# Patient Record
Sex: MALE | Race: WHITE | NOT HISPANIC OR LATINO | Employment: FULL TIME | URBAN - METROPOLITAN AREA
[De-identification: names, ages, dates, MRNs, and addresses within clinical notes are randomized per-mention and may not be internally consistent; named-entity substitution may affect disease eponyms.]

---

## 2017-03-31 ENCOUNTER — ALLSCRIPTS OFFICE VISIT (OUTPATIENT)
Dept: OTHER | Facility: OTHER | Age: 42
End: 2017-03-31

## 2017-03-31 DIAGNOSIS — Z12.5 ENCOUNTER FOR SCREENING FOR MALIGNANT NEOPLASM OF PROSTATE: ICD-10-CM

## 2017-03-31 DIAGNOSIS — Z13.29 ENCOUNTER FOR SCREENING FOR OTHER SUSPECTED ENDOCRINE DISORDER: ICD-10-CM

## 2017-03-31 DIAGNOSIS — Z13.1 ENCOUNTER FOR SCREENING FOR DIABETES MELLITUS: ICD-10-CM

## 2017-03-31 DIAGNOSIS — Z13.0 ENCOUNTER FOR SCREENING FOR DISEASES OF THE BLOOD AND BLOOD-FORMING ORGANS AND CERTAIN DISORDERS INVOLVING THE IMMUNE MECHANISM: ICD-10-CM

## 2017-03-31 DIAGNOSIS — R73.01 IMPAIRED FASTING GLUCOSE: ICD-10-CM

## 2017-03-31 DIAGNOSIS — E55.9 VITAMIN D DEFICIENCY: ICD-10-CM

## 2017-03-31 DIAGNOSIS — E78.5 HYPERLIPIDEMIA: ICD-10-CM

## 2017-03-31 DIAGNOSIS — E29.1 TESTICULAR HYPOFUNCTION: ICD-10-CM

## 2017-04-03 ENCOUNTER — TRANSCRIBE ORDERS (OUTPATIENT)
Dept: LAB | Facility: CLINIC | Age: 42
End: 2017-04-03

## 2017-04-03 ENCOUNTER — APPOINTMENT (OUTPATIENT)
Dept: LAB | Facility: CLINIC | Age: 42
End: 2017-04-03
Payer: COMMERCIAL

## 2017-04-03 DIAGNOSIS — Z13.29 ENCOUNTER FOR SCREENING FOR OTHER SUSPECTED ENDOCRINE DISORDER: ICD-10-CM

## 2017-04-03 DIAGNOSIS — Z12.5 ENCOUNTER FOR SCREENING FOR MALIGNANT NEOPLASM OF PROSTATE: ICD-10-CM

## 2017-04-03 DIAGNOSIS — Z13.0 ENCOUNTER FOR SCREENING FOR DISEASES OF THE BLOOD AND BLOOD-FORMING ORGANS AND CERTAIN DISORDERS INVOLVING THE IMMUNE MECHANISM: ICD-10-CM

## 2017-04-03 DIAGNOSIS — Z13.1 ENCOUNTER FOR SCREENING FOR DIABETES MELLITUS: ICD-10-CM

## 2017-04-03 DIAGNOSIS — E55.9 VITAMIN D DEFICIENCY: ICD-10-CM

## 2017-04-03 DIAGNOSIS — E29.1 TESTICULAR HYPOFUNCTION: ICD-10-CM

## 2017-04-03 DIAGNOSIS — E78.5 HYPERLIPIDEMIA: ICD-10-CM

## 2017-04-03 LAB
25(OH)D3 SERPL-MCNC: 16.4 NG/ML (ref 30–100)
ALBUMIN SERPL BCP-MCNC: 3.9 G/DL (ref 3.5–5)
ALP SERPL-CCNC: 70 U/L (ref 46–116)
ALT SERPL W P-5'-P-CCNC: 89 U/L (ref 12–78)
ANION GAP SERPL CALCULATED.3IONS-SCNC: 6 MMOL/L (ref 4–13)
AST SERPL W P-5'-P-CCNC: 31 U/L (ref 5–45)
BILIRUB SERPL-MCNC: 0.58 MG/DL (ref 0.2–1)
BUN SERPL-MCNC: 13 MG/DL (ref 5–25)
CALCIUM SERPL-MCNC: 9.1 MG/DL (ref 8.3–10.1)
CHLORIDE SERPL-SCNC: 106 MMOL/L (ref 100–108)
CHOLEST SERPL-MCNC: 174 MG/DL (ref 50–200)
CO2 SERPL-SCNC: 26 MMOL/L (ref 21–32)
CREAT SERPL-MCNC: 0.92 MG/DL (ref 0.6–1.3)
ERYTHROCYTE [DISTWIDTH] IN BLOOD BY AUTOMATED COUNT: 14 % (ref 11.6–15.1)
GFR SERPL CREATININE-BSD FRML MDRD: >60 ML/MIN/1.73SQ M
GLUCOSE P FAST SERPL-MCNC: 112 MG/DL (ref 65–99)
HCT VFR BLD AUTO: 48.5 % (ref 36.5–49.3)
HDLC SERPL-MCNC: 29 MG/DL (ref 40–60)
HGB BLD-MCNC: 16.4 G/DL (ref 12–17)
MCH RBC QN AUTO: 30.5 PG (ref 26.8–34.3)
MCHC RBC AUTO-ENTMCNC: 33.8 G/DL (ref 31.4–37.4)
MCV RBC AUTO: 90 FL (ref 82–98)
PLATELET # BLD AUTO: 262 THOUSANDS/UL (ref 149–390)
PMV BLD AUTO: 10.6 FL (ref 8.9–12.7)
POTASSIUM SERPL-SCNC: 4.1 MMOL/L (ref 3.5–5.3)
PROT SERPL-MCNC: 7.4 G/DL (ref 6.4–8.2)
PSA SERPL-MCNC: 0.6 NG/ML (ref 0–4)
RBC # BLD AUTO: 5.37 MILLION/UL (ref 3.88–5.62)
SODIUM SERPL-SCNC: 138 MMOL/L (ref 136–145)
TRIGL SERPL-MCNC: 774 MG/DL
TSH SERPL DL<=0.05 MIU/L-ACNC: 2.1 UIU/ML (ref 0.36–3.74)
WBC # BLD AUTO: 10.32 THOUSAND/UL (ref 4.31–10.16)

## 2017-04-03 PROCEDURE — G0103 PSA SCREENING: HCPCS

## 2017-04-03 PROCEDURE — 80053 COMPREHEN METABOLIC PANEL: CPT

## 2017-04-03 PROCEDURE — 36415 COLL VENOUS BLD VENIPUNCTURE: CPT

## 2017-04-03 PROCEDURE — 85027 COMPLETE CBC AUTOMATED: CPT

## 2017-04-03 PROCEDURE — 80061 LIPID PANEL: CPT

## 2017-04-03 PROCEDURE — 84402 ASSAY OF FREE TESTOSTERONE: CPT

## 2017-04-03 PROCEDURE — 84443 ASSAY THYROID STIM HORMONE: CPT

## 2017-04-03 PROCEDURE — 84403 ASSAY OF TOTAL TESTOSTERONE: CPT

## 2017-04-03 PROCEDURE — 82306 VITAMIN D 25 HYDROXY: CPT

## 2017-04-04 LAB
TESTOST FREE SERPL-MCNC: 19.5 PG/ML (ref 6.8–21.5)
TESTOST SERPL-MCNC: 195 NG/DL (ref 348–1197)
TESTOSTERONE COMMENT: ABNORMAL

## 2017-04-05 ENCOUNTER — GENERIC CONVERSION - ENCOUNTER (OUTPATIENT)
Dept: OTHER | Facility: OTHER | Age: 42
End: 2017-04-05

## 2017-04-06 ENCOUNTER — TRANSCRIBE ORDERS (OUTPATIENT)
Dept: LAB | Facility: CLINIC | Age: 42
End: 2017-04-06

## 2017-04-06 ENCOUNTER — APPOINTMENT (OUTPATIENT)
Dept: LAB | Facility: CLINIC | Age: 42
End: 2017-04-06
Payer: COMMERCIAL

## 2017-04-06 DIAGNOSIS — R73.01 IMPAIRED FASTING GLUCOSE: ICD-10-CM

## 2017-04-06 LAB
EST. AVERAGE GLUCOSE BLD GHB EST-MCNC: 120 MG/DL
HBA1C MFR BLD: 5.8 % (ref 4.2–6.3)

## 2017-04-06 PROCEDURE — 83036 HEMOGLOBIN GLYCOSYLATED A1C: CPT

## 2017-04-06 PROCEDURE — 36415 COLL VENOUS BLD VENIPUNCTURE: CPT

## 2017-04-07 ENCOUNTER — GENERIC CONVERSION - ENCOUNTER (OUTPATIENT)
Dept: OTHER | Facility: OTHER | Age: 42
End: 2017-04-07

## 2017-07-05 ENCOUNTER — ALLSCRIPTS OFFICE VISIT (OUTPATIENT)
Dept: OTHER | Facility: OTHER | Age: 42
End: 2017-07-05

## 2017-07-24 ENCOUNTER — ALLSCRIPTS OFFICE VISIT (OUTPATIENT)
Dept: OTHER | Facility: OTHER | Age: 42
End: 2017-07-24

## 2017-07-24 DIAGNOSIS — M54.16 RADICULOPATHY OF LUMBAR REGION: ICD-10-CM

## 2017-07-24 DIAGNOSIS — M25.572 PAIN IN LEFT ANKLE: ICD-10-CM

## 2017-07-24 DIAGNOSIS — R20.0 ANESTHESIA OF SKIN: ICD-10-CM

## 2017-07-25 ENCOUNTER — TRANSCRIBE ORDERS (OUTPATIENT)
Dept: URGENT CARE | Facility: CLINIC | Age: 42
End: 2017-07-25

## 2017-07-25 ENCOUNTER — APPOINTMENT (OUTPATIENT)
Dept: RADIOLOGY | Facility: CLINIC | Age: 42
End: 2017-07-25
Payer: COMMERCIAL

## 2017-07-25 DIAGNOSIS — M25.572 PAIN IN LEFT ANKLE: ICD-10-CM

## 2017-07-25 PROCEDURE — 73610 X-RAY EXAM OF ANKLE: CPT

## 2017-07-27 ENCOUNTER — GENERIC CONVERSION - ENCOUNTER (OUTPATIENT)
Dept: OTHER | Facility: OTHER | Age: 42
End: 2017-07-27

## 2017-08-04 ENCOUNTER — ALLSCRIPTS OFFICE VISIT (OUTPATIENT)
Dept: OTHER | Facility: OTHER | Age: 42
End: 2017-08-04

## 2017-10-13 ENCOUNTER — GENERIC CONVERSION - ENCOUNTER (OUTPATIENT)
Dept: OTHER | Facility: OTHER | Age: 42
End: 2017-10-13

## 2018-01-10 NOTE — RESULT NOTES
Verified Results  (1) CBC/ PLT (NO DIFF) 03Apr2017 07:56AM Radha Reilly Order Number: [id number]     Test Name Result Flag Reference   HEMATOCRIT 48 5 %  36 5-49 3   HEMOGLOBIN 16 4 g/dL  12 0-17 0   MCHC 33 8 g/dL  31 4-37 4   MCH 30 5 pg  26 8-34 3   MCV 90 fL  82-98   PLATELET COUNT 700 Thousands/uL  149-390   RBC COUNT 5 37 Million/uL  3 88-5 62   RDW 14 0 %  11 6-15 1   WBC COUNT 10 32 Thousand/uL H 4 31-10 16   MPV 10 6 fL  8 9-12 7     (1) COMPREHENSIVE METABOLIC PANEL [id number] 97:05EX Radha Reilly Order Number: [id number]     Test Name Result Flag Reference   SODIUM 138 mmol/L  136-145   POTASSIUM 4 1 mmol/L  3 5-5 3   CHLORIDE 106 mmol/L  100-108   CARBON DIOXIDE 26 mmol/L  21-32   ANION GAP (CALC) 6 mmol/L  4-13   BLOOD UREA NITROGEN 13 mg/dL  5-25   CREATININE 0 92 mg/dL  0 60-1 30   Standardized to IDMS reference method   CALCIUM 9 1 mg/dL  8 3-10 1   BILI, TOTAL 0 58 mg/dL  0 20-1 00   ALK PHOSPHATAS 70 U/L     ALT (SGPT) 89 U/L H 12-78   AST(SGOT) 31 U/L  5-45   ALBUMIN 3 9 g/dL  3 5-5 0   TOTAL PROTEIN 7 4 g/dL  6 4-8 2   eGFR Non-African American      >60 0 ml/min/1 73sq m   - Patient Instructions: This is a fasting blood test  Water,black tea or black  coffee only after 9:00pm the night before test Drink 2 glasses of water the morning of test - Patient Instructions: This bloodwork is non-fasting  Please drink two glasses of   water morning of bloodwork  National Kidney Disease Education Program recommendations are as follows:  GFR calculation is accurate only with a steady state creatinine  Chronic Kidney disease less than 60 ml/min/1 73 sq  meters  Kidney failure less than 15 ml/min/1 73 sq  meters     GLUCOSE FASTING 112 mg/dL H 65-99     (1) LIPID PANEL, FASTING 03Apr2017 07:56AM Hermes Rodriguez Order Number: [id number]     Test Name Result Flag Reference   CHOLESTEROL 174 mg/dL     HDL,DIRECT 29 mg/dL L 40-60   Specimen collection should occur prior to Metamizole administration due to the potential for falsely depressed results  LDL CHOLESTEROL CALCULATED (Report)  0-100   Calculated LDL invalid, triglycerides >400 mg/dl   - Patient Instructions: This is a fasting blood test  Water,black tea or black coffee only after 9:00pm the night before test   Drink 2 glasses of water the morning of test     - Patient Instructions: This is a fasting blood test  Water,black tea or black coffee only after 9:00pm the night before test Drink 2 glasses of water the morning of test - Patient Instructions: This bloodwork is non-fasting  Please drink two glasses of   water morning of bloodwork  Triglyceride:       Normal       <150 mg/dl     Borderline High  150-199 mg/dl     High        200-499 mg/dl     Very High     >499 mg/dl  Cholesterol:       Desirable    <200 mg/dl    Borderline High 200-239 mg/dl    High       >239 mg/dl  HDL Cholesterol:      High  >59 mg/dL    Low   <41 mg/dL   Calculated LDL invalid, triglycerides >400 mg/dl   - Patient Instructions: This is a fasting blood test  Water,black tea or black  coffee only after 9:00pm the night before test   Drink 2 glasses of water the morning of test     - Patient Instructions: This is a fasting blood test  Water,black tea or black  coffee only after 9:00pm the night before test Drink 2 glasses of water the morning of test - Patient Instructions: This bloodwork is non-fasting  Please drink two glasses of   water morning of bloodwork    Triglyceride:         Normal              <150 mg/dl       Borderline High    150-199 mg/dl       High               200-499 mg/dl       Very High          >499 mg/dl  Cholesterol:         Desirable        <200 mg/dl      Borderline High  200-239 mg/dl      High             >239 mg/dl  HDL Cholesterol:        High    >59 mg/dL      Low     <41 mg/dL   TRIGLYCERIDES 774 mg/dL H <=150   Specimen collection should occur prior to N-Acetylcysteine or Metamizole administration due to the potential for falsely depressed results  (1) TSH 03Apr2017 07:56AM Herb Giovanna Order Number: [id number]     Test Name Result Flag Reference   TSH 2 100 uIU/mL  0 358-3 740   - Patient Instructions: This bloodwork is non-fasting  Please drink two glasses of water morning of bloodwork  - Patient Instructions: This is a fasting blood test  Water,black tea or black  coffee only after 9:00pm the night before test Drink 2 glasses of water the morning of test - Patient Instructions: This bloodwork is non-fasting  Please drink two glasses of   water morning of bloodwork  Patients undergoing fluorescein dye angiography may retain small amounts of fluorescein in the body for 48-72 hours post procedure  Samples containing fluorescein can produce falsely depressed TSH values  If the patient had this procedure,a specimen should be resubmitted post fluorescein clearance  (1) VITAMIN D 25-HYDROXY 03Apr2017 07:56AM Jess Reilly Order Number: [id number]     Test Name Result Flag Reference   VIT D 25-HYDROX 16 4 ng/mL L 30 0-100 0   This assay is a certified procedure of the CDC Vitamin D Standardization Certification Program (VDSCP)     Deficiency <20ng/ml   Insufficiency 20-30ng/ml   Sufficient  ng/ml     *Patients undergoing fluorescein dye angiography may retain small amounts of fluorescein in the body for 48-72 hours post procedure  Samples containing fluorescein can produce falsely elevated Vitamin D values  If the patient had this procedure, a specimen should be resubmitted post fluorescein clearance       (1) PSA (SCREEN) (Dx V76 44 Screen for Prostate Cancer) 03Apr2017 07:56AM Herb Giovanna Order Number: [id number]     Test Name Result Flag Reference   PROSTATE SPECIFIC ANTIGEN 0 6 ng/mL  0 0-4 0   American Urological Association Guidelines define biochemical recurrence of prostate cancer as a detectable or rising PSA value post-radical prostatectomy that is greater than or equal to 0 2 ng/mL with a second confirmatory level of greater than or equal to 0 2 ng/mL  - Patient Instructions: This test is non-fasting  Please drink two glasses of water morning of bloodwork  - Patient Instructions: This test is non-fasting  Please drink two glasses of water morning of bloodwork  (1) TESTOSTERONE, FREE (DIRECT) AND TOTAL 03Apr2017 07:56AM Ashland Silence Order Number: [id number]     Test Name Result Flag Reference   FREE TESTOSTERONE, DIRECT 19 5 pg/mL  6 8 - 21 5   COMMENT Comment     Adult male reference interval is based on a population of lean males  up to 36years old     TESTOSTERONE (TOTAL) 195 ng/dL L 348 - 5913   Performed at:  [address]  [id number]  : Michael Tinoco MD, Phone:  [phone]       Plan  Hyperlipidemia    · Atorvastatin Calcium 20 MG Oral Tablet (Lipitor); TAKE 1 TABLET DAILY  Vitamin D deficiency    · Vitamin D3 45567 UNIT Oral Tablet; TAKE 1 TABLET Weekly

## 2018-01-11 NOTE — RESULT NOTES
Verified Results  * XR ANKLE 3+ VIEW LEFT [id number] 08:20AM Rosana Clark Order Number: [id number]     Test Name Result Flag Reference   XR ANKLE 3+ VW LEFT (Report)     LEFT ANKLE     INDICATION: Lateral left ankle pain     COMPARISON: None     VIEWS: 3     IMAGES: 3     FINDINGS:     There is no acute fracture or dislocation  No degenerative changes  No lytic or blastic lesions are seen  Soft tissues are unremarkable  IMPRESSION:     No acute osseous abnormality         Workstation performed: [id number]     Signed by:   Queenie Ortega MD   7/27/17

## 2018-01-12 NOTE — RESULT NOTES
Verified Results  (1) WOUND CULTURE [id number] 10:47AM Heidi Reilly     Test Name Result Flag Reference   CLINICAL REPORT (Report)     Test:        Wound culture  Specimen Type:    Wound  Specimen Date:   1/13/2016 12:03 PM  Result Date:    1/15/2016 10:47 AM  Result Status:   Final result  Resulting Lab:   [facility] [address] [facility] [address]            Tel: [phone]                 CULTURE                                       ------------------                                   3+ Growth of Mixed Skin Betty      STAIN                                        ------------------                                   Rare Polys    1+ Gram positive cocci in pairs and chains    1+ Gram positive cocci in groups    Rare Gram negative rods

## 2018-01-13 VITALS
WEIGHT: 213.38 LBS | HEART RATE: 80 BPM | SYSTOLIC BLOOD PRESSURE: 126 MMHG | DIASTOLIC BLOOD PRESSURE: 80 MMHG | RESPIRATION RATE: 16 BRPM | HEIGHT: 72 IN | BODY MASS INDEX: 28.9 KG/M2 | TEMPERATURE: 99.2 F

## 2018-01-14 VITALS
DIASTOLIC BLOOD PRESSURE: 88 MMHG | SYSTOLIC BLOOD PRESSURE: 138 MMHG | WEIGHT: 228.38 LBS | HEART RATE: 90 BPM | RESPIRATION RATE: 16 BRPM | TEMPERATURE: 98.9 F | BODY MASS INDEX: 30.93 KG/M2 | OXYGEN SATURATION: 97 % | HEIGHT: 72 IN

## 2018-01-14 VITALS
WEIGHT: 205 LBS | SYSTOLIC BLOOD PRESSURE: 131 MMHG | BODY MASS INDEX: 27.77 KG/M2 | DIASTOLIC BLOOD PRESSURE: 84 MMHG | HEART RATE: 96 BPM | HEIGHT: 72 IN

## 2018-01-14 VITALS
WEIGHT: 208.38 LBS | DIASTOLIC BLOOD PRESSURE: 84 MMHG | TEMPERATURE: 99.1 F | RESPIRATION RATE: 16 BRPM | HEIGHT: 72 IN | BODY MASS INDEX: 28.22 KG/M2 | HEART RATE: 80 BPM | SYSTOLIC BLOOD PRESSURE: 130 MMHG

## 2018-01-16 NOTE — RESULT NOTES
Verified Results  (1) HEMOGLOBIN A1C 06Apr2017 07:47AM Jackie Solomon Order Number: [id number]     Test Name Result Flag Reference   HEMOGLOBIN A1C 5 8 %  4 2-6 3   EST  AVG   GLUCOSE 120 mg/dl

## 2018-01-22 VITALS
OXYGEN SATURATION: 97 % | HEART RATE: 90 BPM | TEMPERATURE: 99 F | RESPIRATION RATE: 16 BRPM | BODY MASS INDEX: 27.12 KG/M2 | SYSTOLIC BLOOD PRESSURE: 136 MMHG | DIASTOLIC BLOOD PRESSURE: 94 MMHG | HEIGHT: 72 IN | WEIGHT: 200.25 LBS

## 2018-01-24 NOTE — MISCELLANEOUS
Assessment   1  Viral infection (079 99) (B34 9)  2  Wound of skin (782 9) (R23 8)  3  Infectious mononucleosis (075) (B27 90)    Plan  Facial cellulitis    · Stop: Clindamycin HCl - 300 MG Oral Capsule  Rx By: Rock Sweet; Dispense: 7 Days ; #:28 Capsule; Refill: 0;For: Facial cellulitis; RIN   = N; Verified Transmission to [facility]; Last Updated By: Governor Wallace;   1/20/2016 8:03:28 AM  Viral infection    · Start: ValACYclovir HCl - 1 GM Oral Tablet (Valtrex); TAKE 1 TABLET 3 TIMES DAILY  Rx By: Jyoti De La O; Dispense: 7 Days ; #:21 Tablet; Refill: 0;For: Viral infection; RIN   = N; Verified Transmission to [facility] Last Updated By: System,   [employer]; 1/20/2016 8:32:51 AM    Discussion/Summary  Discussion Summary:   Labs pending  Will get from hospital and call pt with results  Will start pt with valtrex to help with sore that broke out secondary to acute viral illness  RTO prn  Counseling Documentation With Imm: The patient was counseled regarding instructions for management, risk factor reductions, patient and family education, risks and benefits of treatment options, importance of compliance with treatment  Medication SE Review and Pt Understands Tx: Possible side effects of new medications were reviewed with the patient/guardian today  The treatment plan was reviewed with the patient/guardian  The patient/guardian understands and agrees with the treatment plan      Chief Complaint  Chief Complaint Free Text Note Form: Pt here for f/u from [facility] admission  Pt had recent lip infection and went to ER for IV antibiotic therapy  Pt was told it was viral so he didn't need antibiotics  Pt is feeling better  Pt still having other pain that he discussed in previous visit, bilateral hand pain and neck pain  History of Present Illness  TCM Communication [facility]: The patient is being contacted for follow-up after hospitalization and 01/20/2016   Hospital records were not available  He was hospitalized at [facility]  The  dates of hospitalization: 01/13/20161  ,  date of admission: 01/13/20161  ,  date of discharge: 01/14/20161  1   Diagnosis: viral infection of lip  He was discharged to home  Symptoms: fatigue  Counseling was provided to the patient  Communication performed and completed by ariel sy   HPI: Pt presents for follow up from hospitalization  He was seen because the sore on his lip became bigger and more painful  He was admitted overnight  He was seen by infectious disease and was told etiology was viral  Antibiotics were stopped  Pt has labs drawn but the results were still pending upon his discharge so he does not know if anything came back abnormal        1 Amended By: Cristine Melchor; Jan 22 2016 9:56 AM EST    Review of Systems  Complete-Male:   Constitutional: as noted in HPI  Eyes: No complaints of eye pain, no red eyes, no discharge from eyes, no itchy eyes  ENT: as noted in HPI  Cardiovascular: No complaints of slow heart rate, no fast heart rate, no chest pain, no palpitations, no leg claudication, no lower extremity  Respiratory: No complaints of shortness of breath, no wheezing, no cough, no SOB on exertion, no orthopnea or PND  Gastrointestinal: No complaints of abdominal pain, no constipation, no nausea or vomiting, no diarrhea or bloody stools  Genitourinary: No complaints of dysuria, no incontinence, no hesitancy, no nocturia, no genital lesion, no testicular pain  Musculoskeletal: No complaints of arthralgia, no myalgias, no joint swelling or stiffness, no limb pain or swelling  Integumentary: No complaints of skin rash or skin lesions, no itching, no skin wound, no dry skin  Neurological: No compliants of headache, no confusion, no convulsions, no numbness or tingling, no dizziness or fainting, no limb weakness, no difficulty walking     Psychiatric: Is not suicidal, no sleep disturbances, no anxiety or depression, no change in personality, no emotional problems  Endocrine: No complaints of proptosis, no hot flashes, no muscle weakness, no erectile dysfunction, no deepening of the voice, no feelings of weakness  ROS Reviewed:   ROS reviewed  Active Problems   1  Closed fracture of 5th metacarpal (815 00) (S62 308A)  2  Encounter for screening for malignant neoplasm of prostate (V76 44) (Z12 5)  3  Facial cellulitis (682 0) (L03 211)  4  GERD (gastroesophageal reflux disease) (530 81) (K21 9)  5  Hand injury (959 4) (S69 90XA)  6  Hyperlipidemia (272 4) (E78 5)  7  Lipid screening (V77 91) (Z13 220)  8  Neck pain (723 1) (M54 2)  9  Need for influenza vaccination (V04 81) (Z23)  10  Nicotine dependence (305 1) (F17 200)  11  Pain in both hands (729 5) (M79 641,M79 642)  12  Pain of both wrist joints (719 43) (M25 531,M25 532)  13  Screening for depression (V79 0) (Z13 89)    Past Medical History   1  No pertinent past medical history    Surgical History   1  History of Extensor Tendon Repair (Each)  2  History of Neuroplasty Median Nerve At Carpal Tunnel  Surgical History Reviewed: The surgical history was reviewed and updated today  Family History   1  No pertinent family history   2  Family history of diabetes mellitus (V18 0) (Z83 3)  Family History Reviewed: The family history was reviewed and updated today  Social History    · Current smoker (305 1) (Z72 0)   ·    · Occasional alcohol use   · Social alcohol use (F10 99)  Social History Reviewed: The social history was reviewed and updated today  The social history was reviewed and is unchanged  Current Meds  1  Clindamycin HCl - 300 MG Oral Capsule; take 1 capsule 4 times daily; Therapy: 57YXU4017 to (Evaluate:18Jan2016)  Requested for: 56KXA1987; Last   Rx:11Jan2016 Ordered  2  Ibuprofen 600 MG Oral Tablet; TAKE 1 TABLET Every 6 hours; Therapy: 11ERD8891 to (Evaluate:16Oct2015) Recorded  3   Lansoprazole 30 MG Oral Capsule Delayed Release; TAKE 1 CAPSULE Daily; Therapy: 64BQH4267 to (Last Rx:34Vta6781)  Requested for: 11Dec2015 Ordered  4  Oxycodone-Acetaminophen 5-325 MG Oral Tablet; TAKE 1 TABLET EVERY 6 HOURS AS   NEEDED; Therapy: 38DET1110 to (Evaluate:17Jan2016); Last Rx:12Jan2016 Ordered  5  Simvastatin 20 MG Oral Tablet; TAKE 1 TABLET DAILY AS DIRECTED; Therapy: 50UVQ4933 to (Evaluate:11Apr2016); Last Rx:14Oct2015 Ordered  Medication List Reviewed: The medication list was reviewed and updated today  Allergies   1  No Known Drug Allergies   2  Other    Vitals  Signs [Data Includes: Current Encounter]   Recorded: 39LNM3455 08:03AM   Temperature: 97 F, Tympanic  Heart Rate: 100  Respiration: 18  Systolic: 135  Diastolic: 82  Height: 6 ft   Weight: 214 lb 2 08 oz  BMI Calculated: 29 04  BSA Calculated: 2 19    Physical Exam    Constitutional   General appearance: No acute distress, well appearing and well nourished  Eyes   Conjunctiva and lids: No swelling, erythema, or discharge  Pupils and irises: Equal, round and reactive to light  Ears, Nose, Mouth, and Throat   External inspection of ears and nose: Normal     Otoscopic examination: Tympanic membrance translucent with normal light reflex  Canals patent without erythema  Nasal mucosa, septum, and turbinates: Normal without edema or erythema  Oropharynx: Normal with no erythema, edema, exudate or lesions  Pulmonary   Respiratory effort: No increased work of breathing or signs of respiratory distress  Auscultation of lungs: Clear to auscultation, equal breath sounds bilaterally, no wheezes, no rales, no rhonci  Cardiovascular   Auscultation of heart: Normal rate and rhythm, normal S1 and S2, without murmurs  Examination of extremities for edema and/or varicosities: Normal     Carotid pulses: Normal     Abdomen   Abdomen: Non-tender, no masses  Liver and spleen: No hepatomegaly or splenomegaly      Lymphatic   Palpation of lymph nodes in neck: No lymphadenopathy  Musculoskeletal   Gait and station: Normal     Digits and nails: Normal without clubbing or cyanosis  Inspection/palpation of joints, bones, and muscles: Normal     Skin   Skin and subcutaneous tissue: Normal without rashes or lesions  Examination of the skin for lesions: Abnormal   A single erosion(s)  on the lower lip  Psychiatric   Orientation to person, place and time: Normal     Mood and affect: Normal          Attending Note  Collaborating Physician Note: Collaborating Physician: I agree with the Advanced Practitioner note        Signatures   Electronically signed by : Sigifredo Muñoz, [facility]; Jan 21 2016  5:19PM EST                       (Author)    Electronically signed by : Jyothi Moreno DO; Jan 21 2016  5:52PM EST                       (Co-author)    Electronically signed by : JAN Khoury ; Jul 18 2016 12:43PM EST                       (Author)

## 2018-01-25 DIAGNOSIS — G89.4 CHRONIC PAIN SYNDROME: Primary | ICD-10-CM

## 2018-01-25 DIAGNOSIS — K21.9 GASTROESOPHAGEAL REFLUX DISEASE WITHOUT ESOPHAGITIS: Primary | ICD-10-CM

## 2018-01-25 RX ORDER — HYDROCODONE BITARTRATE AND ACETAMINOPHEN 10; 325 MG/1; MG/1
1 TABLET ORAL 4 TIMES DAILY
COMMUNITY
Start: 2015-10-02 | End: 2018-01-25 | Stop reason: SDUPTHER

## 2018-01-25 RX ORDER — HYDROCODONE BITARTRATE AND ACETAMINOPHEN 10; 325 MG/1; MG/1
1 TABLET ORAL 4 TIMES DAILY
Qty: 30 TABLET | Refills: 0 | Status: SHIPPED | OUTPATIENT
Start: 2018-01-25 | End: 2018-01-26 | Stop reason: SDUPTHER

## 2018-01-25 RX ORDER — LANSOPRAZOLE 30 MG/1
CAPSULE, DELAYED RELEASE ORAL
Qty: 30 CAPSULE | Refills: 2 | Status: SHIPPED | OUTPATIENT
Start: 2018-01-25 | End: 2018-01-26 | Stop reason: SDUPTHER

## 2018-01-26 DIAGNOSIS — K21.9 GASTROESOPHAGEAL REFLUX DISEASE WITHOUT ESOPHAGITIS: ICD-10-CM

## 2018-01-26 DIAGNOSIS — G89.4 CHRONIC PAIN SYNDROME: ICD-10-CM

## 2018-01-26 RX ORDER — LANSOPRAZOLE 30 MG/1
CAPSULE, DELAYED RELEASE ORAL
Qty: 30 CAPSULE | Refills: 2 | Status: SHIPPED | OUTPATIENT
Start: 2018-01-26 | End: 2018-02-23 | Stop reason: SDUPTHER

## 2018-01-26 RX ORDER — HYDROCODONE BITARTRATE AND ACETAMINOPHEN 10; 325 MG/1; MG/1
1 TABLET ORAL 4 TIMES DAILY
Qty: 120 TABLET | Refills: 0 | Status: SHIPPED | OUTPATIENT
Start: 2018-01-26 | End: 2018-02-23 | Stop reason: SDUPTHER

## 2018-02-03 DIAGNOSIS — F33.41 RECURRENT MAJOR DEPRESSIVE DISORDER, IN PARTIAL REMISSION (HCC): Primary | ICD-10-CM

## 2018-02-03 RX ORDER — DULOXETIN HYDROCHLORIDE 60 MG/1
CAPSULE, DELAYED RELEASE ORAL
Qty: 30 CAPSULE | Refills: 5 | Status: SHIPPED | OUTPATIENT
Start: 2018-02-03 | End: 2018-07-29 | Stop reason: SDUPTHER

## 2018-02-21 ENCOUNTER — TELEPHONE (OUTPATIENT)
Dept: FAMILY MEDICINE CLINIC | Facility: CLINIC | Age: 43
End: 2018-02-21

## 2018-02-23 DIAGNOSIS — K21.9 GASTROESOPHAGEAL REFLUX DISEASE WITHOUT ESOPHAGITIS: ICD-10-CM

## 2018-02-23 DIAGNOSIS — G89.4 CHRONIC PAIN SYNDROME: ICD-10-CM

## 2018-02-23 RX ORDER — HYDROCODONE BITARTRATE AND ACETAMINOPHEN 10; 325 MG/1; MG/1
1 TABLET ORAL EVERY 6 HOURS PRN
Qty: 120 TABLET | Refills: 0 | Status: SHIPPED | OUTPATIENT
Start: 2018-02-23 | End: 2018-03-23 | Stop reason: SDUPTHER

## 2018-02-26 RX ORDER — LANSOPRAZOLE 30 MG/1
CAPSULE, DELAYED RELEASE ORAL
Qty: 30 CAPSULE | Refills: 2 | Status: SHIPPED | OUTPATIENT
Start: 2018-02-26 | End: 2018-06-21 | Stop reason: SDUPTHER

## 2018-03-19 ENCOUNTER — OFFICE VISIT (OUTPATIENT)
Dept: FAMILY MEDICINE CLINIC | Facility: CLINIC | Age: 43
End: 2018-03-19
Payer: COMMERCIAL

## 2018-03-19 VITALS
TEMPERATURE: 99.2 F | WEIGHT: 214 LBS | DIASTOLIC BLOOD PRESSURE: 86 MMHG | SYSTOLIC BLOOD PRESSURE: 130 MMHG | BODY MASS INDEX: 28.99 KG/M2 | HEART RATE: 84 BPM | RESPIRATION RATE: 16 BRPM | HEIGHT: 72 IN

## 2018-03-19 DIAGNOSIS — Z13.220 SCREENING FOR HYPERLIPIDEMIA: ICD-10-CM

## 2018-03-19 DIAGNOSIS — Z13.29 SCREENING FOR THYROID DISORDER: ICD-10-CM

## 2018-03-19 DIAGNOSIS — Z13.0 SCREENING FOR DEFICIENCY ANEMIA: ICD-10-CM

## 2018-03-19 DIAGNOSIS — Z13.1 SCREENING FOR DIABETES MELLITUS: ICD-10-CM

## 2018-03-19 DIAGNOSIS — R07.9 CHEST PAIN, UNSPECIFIED TYPE: Primary | ICD-10-CM

## 2018-03-19 DIAGNOSIS — E55.9 VITAMIN D DEFICIENCY: ICD-10-CM

## 2018-03-19 DIAGNOSIS — M77.12 LATERAL EPICONDYLITIS OF LEFT ELBOW: ICD-10-CM

## 2018-03-19 PROBLEM — M54.16 LUMBAR RADICULOPATHY: Status: ACTIVE | Noted: 2017-08-04

## 2018-03-19 PROBLEM — F32.A ANXIETY AND DEPRESSION: Status: ACTIVE | Noted: 2017-07-05

## 2018-03-19 PROBLEM — E29.1 TESTICULAR HYPOGONADISM: Status: ACTIVE | Noted: 2017-03-31

## 2018-03-19 PROBLEM — F41.9 ANXIETY AND DEPRESSION: Status: ACTIVE | Noted: 2017-07-05

## 2018-03-19 PROCEDURE — 99214 OFFICE O/P EST MOD 30 MIN: CPT | Performed by: PHYSICIAN ASSISTANT

## 2018-03-19 RX ORDER — PREGABALIN 75 MG/1
1 CAPSULE ORAL 2 TIMES DAILY
COMMUNITY
Start: 2017-10-13 | End: 2018-03-19 | Stop reason: ALTCHOICE

## 2018-03-19 RX ORDER — ATORVASTATIN CALCIUM 20 MG/1
1 TABLET, FILM COATED ORAL DAILY
COMMUNITY
Start: 2017-04-05 | End: 2018-04-18 | Stop reason: SDUPTHER

## 2018-03-19 NOTE — PROGRESS NOTES
Assessment/Plan:    Lateral epicondylitis of left elbow  Will refer to ortho for evaluation    Chest pain  Will get CXR due to smoking hx and stress echo to better evaluate if pain is cardiac origin       Diagnoses and all orders for this visit:    Chest pain, unspecified type  -     Echo stress test w contrast if indicated; Future  -     XR chest pa & lateral; Future    Screening for deficiency anemia  -     CBC and differential; Future  -     CBC and differential    Screening for diabetes mellitus  -     Comprehensive metabolic panel; Future  -     Comprehensive metabolic panel    Screening for hyperlipidemia  -     Lipid panel; Future  -     Lipid panel    Screening for thyroid disorder  -     TSH, 3rd generation with T4 reflex; Future  -     TSH, 3rd generation with T4 reflex    Vitamin D deficiency  -     Vitamin D 25 hydroxy; Future  -     Vitamin D 25 hydroxy    Lateral epicondylitis of left elbow  -     Ambulatory referral to Orthopedic Surgery; Future    Other orders  -     atorvastatin (LIPITOR) 20 mg tablet; Take 1 tablet by mouth daily  -     Cholecalciferol (D3-50) 71366 units capsule; Take 1 capsule by mouth once a week  -     Discontinue: pregabalin (LYRICA) 75 mg capsule; Take 1 capsule by mouth 2 (two) times a day          Subjective:      Patient ID: Edilberto Lira is a 36 y o  male  Pt presents complaining of increased pain  He notes that the pain starts in the back and chest and radiates upward into the neck and teeth  Lasts approx 15 minutes  Had 2 episodes within one week with the most recent occurring 1 1/2 weeks ago  He notes a sense of mental 'fuzziness' when it occurs  No vision change  Has a pulsatile quality at times  It awakens him from sleep at time  He describes the pain as squeezing pressure  He feels as though the pain is rolling through him  Not exertional  No palpitations or SOB  Denies numbness or tingling  Arm Pain    The incident occurred more than 1 week ago   The injury mechanism was repetitive motion  The pain is present in the left elbow  The quality of the pain is described as shooting  The pain radiates to the left arm  The pain is at a severity of 5/10  The pain is moderate  Associated symptoms include chest pain  The symptoms are aggravated by movement and lifting  Treatments tried: vicodin  The treatment provided no relief  The following portions of the patient's history were reviewed and updated as appropriate:   He  has a past medical history of Chronic arm pain; Chronic back pain; GERD (gastroesophageal reflux disease); Hyperlipidemia; and Vitamin D deficiency  He   Patient Active Problem List    Diagnosis Date Noted    Chest pain 03/19/2018    Lateral epicondylitis of left elbow 03/19/2018    Lumbar radiculopathy 08/04/2017    Anxiety and depression 07/05/2017    Testicular hypogonadism 03/31/2017    Vitamin D deficiency 03/31/2017    Cervical radiculopathy 07/11/2016    Essential hypertriglyceridemia 02/19/2016    GERD (gastroesophageal reflux disease) 10/02/2015    Nicotine dependence 10/02/2015     He  has a past surgical history that includes Tendon repair and Neuroplasty / transposition median nerve at carpal tunnel  His family history includes Diabetes in his father; No Known Problems in his mother  He  reports that he has been smoking Cigarettes  He has a 37 50 pack-year smoking history  He has never used smokeless tobacco  He reports that he drinks about 7 2 oz of alcohol per week   He reports that he does not use drugs  Current Outpatient Prescriptions   Medication Sig Dispense Refill    atorvastatin (LIPITOR) 20 mg tablet Take 1 tablet by mouth daily      Cholecalciferol (D3-50) 14136 units capsule Take 1 capsule by mouth once a week      DULoxetine (CYMBALTA) 60 mg delayed release capsule 07/28  Matt Marroquin TAKE 1 CAPSULE BY MOUTH ONCE DAILY 30 capsule 5    HYDROcodone-acetaminophen (NORCO)  mg per tablet Take 1 tablet by mouth every 6 (six) hours as needed for moderate pain Earliest Fill Date: 2/23/18 Max Daily Amount: 4 tablets 120 tablet 0    lansoprazole (PREVACID) 30 mg capsule 04/18/17  Andry Peña TAKE 1 CAPSULE DAILY 30 capsule 2     No current facility-administered medications for this visit  Current Outpatient Prescriptions on File Prior to Visit   Medication Sig    DULoxetine (CYMBALTA) 60 mg delayed release capsule 07/28  Andry Peña TAKE 1 CAPSULE BY MOUTH ONCE DAILY    HYDROcodone-acetaminophen (NORCO)  mg per tablet Take 1 tablet by mouth every 6 (six) hours as needed for moderate pain Earliest Fill Date: 2/23/18 Max Daily Amount: 4 tablets    lansoprazole (PREVACID) 30 mg capsule 04/18/17  Andry Peña TAKE 1 CAPSULE DAILY     No current facility-administered medications on file prior to visit  He is allergic to other       Review of Systems   Constitutional: Negative for chills and fever  HENT: Negative for congestion, ear pain, hearing loss, postnasal drip, rhinorrhea, sinus pain, sinus pressure, sore throat and trouble swallowing  Eyes: Negative for pain and visual disturbance  Respiratory: Negative for cough, chest tightness, shortness of breath and wheezing  Cardiovascular: Positive for chest pain  Negative for palpitations and leg swelling  Gastrointestinal: Negative for abdominal pain, blood in stool, constipation, diarrhea, nausea and vomiting  Endocrine: Negative for cold intolerance, heat intolerance, polydipsia, polyphagia and polyuria  Genitourinary: Negative for difficulty urinating, dysuria, flank pain and urgency  Musculoskeletal: Positive for arthralgias, back pain and myalgias  Negative for gait problem  Skin: Negative for rash  Allergic/Immunologic: Negative  Neurological: Negative for dizziness, weakness, light-headedness and headaches  Hematological: Negative  Psychiatric/Behavioral: Negative for behavioral problems, dysphoric mood and sleep disturbance  The patient is not nervous/anxious  Objective:      /86 (BP Location: Left arm, Patient Position: Sitting, Cuff Size: Large)   Pulse 84   Temp 99 2 °F (37 3 °C) (Tympanic)   Resp 16   Ht 6' (1 829 m)   Wt 97 1 kg (214 lb)   BMI 29 02 kg/m²          Physical Exam   Constitutional: He is oriented to person, place, and time  He appears well-developed and well-nourished  No distress  HENT:   Head: Normocephalic and atraumatic  Right Ear: External ear normal    Left Ear: External ear normal    Nose: Nose normal    Mouth/Throat: Oropharynx is clear and moist  No oropharyngeal exudate  Eyes: Conjunctivae and EOM are normal  Pupils are equal, round, and reactive to light  Right eye exhibits no discharge  Left eye exhibits no discharge  No scleral icterus  Neck: Normal range of motion  Neck supple  No thyromegaly present  Cardiovascular: Normal rate, regular rhythm and normal heart sounds  Exam reveals no gallop and no friction rub  No murmur heard  Pulmonary/Chest: Effort normal and breath sounds normal  No respiratory distress  He has no wheezes  He has no rales  Abdominal: Soft  Bowel sounds are normal  He exhibits no distension  There is no tenderness  Musculoskeletal: Normal range of motion  He exhibits tenderness (lateral epicondyle)  He exhibits no edema or deformity  Neurological: He is alert and oriented to person, place, and time  No cranial nerve deficit  Skin: Skin is warm and dry  He is not diaphoretic  Psychiatric: He has a normal mood and affect   His behavior is normal  Judgment and thought content normal

## 2018-03-20 ENCOUNTER — TRANSCRIBE ORDERS (OUTPATIENT)
Dept: LAB | Facility: CLINIC | Age: 43
End: 2018-03-20

## 2018-03-20 ENCOUNTER — LAB (OUTPATIENT)
Dept: LAB | Facility: CLINIC | Age: 43
End: 2018-03-20
Payer: COMMERCIAL

## 2018-03-20 DIAGNOSIS — Z13.0 SCREENING FOR IRON DEFICIENCY ANEMIA: Primary | ICD-10-CM

## 2018-03-20 DIAGNOSIS — Z13.220 SCREENING FOR LIPOID DISORDERS: ICD-10-CM

## 2018-03-20 DIAGNOSIS — Z13.1 SCREENING FOR DIABETES MELLITUS: ICD-10-CM

## 2018-03-20 DIAGNOSIS — E55.9 AVITAMINOSIS D: ICD-10-CM

## 2018-03-20 DIAGNOSIS — Z13.29 SCREENING FOR THYROID DISORDER: ICD-10-CM

## 2018-03-20 DIAGNOSIS — Z13.1 SCREENING FOR DIABETES MELLITUS: Primary | ICD-10-CM

## 2018-03-20 LAB
25(OH)D3 SERPL-MCNC: 35.2 NG/ML (ref 30–100)
ALBUMIN SERPL BCP-MCNC: 3.9 G/DL (ref 3.5–5)
ALP SERPL-CCNC: 70 U/L (ref 46–116)
ALT SERPL W P-5'-P-CCNC: 44 U/L (ref 12–78)
ANION GAP SERPL CALCULATED.3IONS-SCNC: 5 MMOL/L (ref 4–13)
AST SERPL W P-5'-P-CCNC: 26 U/L (ref 5–45)
BASOPHILS # BLD AUTO: 0.11 THOUSANDS/ΜL (ref 0–0.1)
BASOPHILS NFR BLD AUTO: 1 % (ref 0–1)
BILIRUB SERPL-MCNC: 0.99 MG/DL (ref 0.2–1)
BUN SERPL-MCNC: 13 MG/DL (ref 5–25)
CALCIUM SERPL-MCNC: 8.8 MG/DL (ref 8.3–10.1)
CHLORIDE SERPL-SCNC: 108 MMOL/L (ref 100–108)
CHOLEST SERPL-MCNC: 155 MG/DL (ref 50–200)
CO2 SERPL-SCNC: 27 MMOL/L (ref 21–32)
CREAT SERPL-MCNC: 0.83 MG/DL (ref 0.6–1.3)
EOSINOPHIL # BLD AUTO: 0.38 THOUSAND/ΜL (ref 0–0.61)
EOSINOPHIL NFR BLD AUTO: 4 % (ref 0–6)
ERYTHROCYTE [DISTWIDTH] IN BLOOD BY AUTOMATED COUNT: 14 % (ref 11.6–15.1)
GFR SERPL CREATININE-BSD FRML MDRD: 108 ML/MIN/1.73SQ M
GLUCOSE P FAST SERPL-MCNC: 96 MG/DL (ref 65–99)
HCT VFR BLD AUTO: 47.5 % (ref 36.5–49.3)
HDLC SERPL-MCNC: 47 MG/DL (ref 40–60)
HGB BLD-MCNC: 15.9 G/DL (ref 12–17)
LDLC SERPL CALC-MCNC: 63 MG/DL (ref 0–100)
LYMPHOCYTES # BLD AUTO: 3.01 THOUSANDS/ΜL (ref 0.6–4.47)
LYMPHOCYTES NFR BLD AUTO: 31 % (ref 14–44)
MCH RBC QN AUTO: 30.6 PG (ref 26.8–34.3)
MCHC RBC AUTO-ENTMCNC: 33.5 G/DL (ref 31.4–37.4)
MCV RBC AUTO: 92 FL (ref 82–98)
MONOCYTES # BLD AUTO: 1.24 THOUSAND/ΜL (ref 0.17–1.22)
MONOCYTES NFR BLD AUTO: 13 % (ref 4–12)
NEUTROPHILS # BLD AUTO: 4.91 THOUSANDS/ΜL (ref 1.85–7.62)
NEUTS SEG NFR BLD AUTO: 51 % (ref 43–75)
NRBC BLD AUTO-RTO: 0 /100 WBCS
PLATELET # BLD AUTO: 257 THOUSANDS/UL (ref 149–390)
PMV BLD AUTO: 10 FL (ref 8.9–12.7)
POTASSIUM SERPL-SCNC: 5 MMOL/L (ref 3.5–5.3)
PROT SERPL-MCNC: 7.3 G/DL (ref 6.4–8.2)
RBC # BLD AUTO: 5.19 MILLION/UL (ref 3.88–5.62)
SODIUM SERPL-SCNC: 140 MMOL/L (ref 136–145)
TRIGL SERPL-MCNC: 224 MG/DL
TSH SERPL DL<=0.05 MIU/L-ACNC: 2.33 UIU/ML (ref 0.36–3.74)
WBC # BLD AUTO: 9.68 THOUSAND/UL (ref 4.31–10.16)

## 2018-03-20 PROCEDURE — 80053 COMPREHEN METABOLIC PANEL: CPT

## 2018-03-20 PROCEDURE — 80061 LIPID PANEL: CPT

## 2018-03-20 PROCEDURE — 85025 COMPLETE CBC W/AUTO DIFF WBC: CPT

## 2018-03-20 PROCEDURE — 84443 ASSAY THYROID STIM HORMONE: CPT

## 2018-03-20 PROCEDURE — 36415 COLL VENOUS BLD VENIPUNCTURE: CPT

## 2018-03-20 PROCEDURE — 82306 VITAMIN D 25 HYDROXY: CPT

## 2018-03-22 ENCOUNTER — TELEPHONE (OUTPATIENT)
Dept: FAMILY MEDICINE CLINIC | Facility: CLINIC | Age: 43
End: 2018-03-22

## 2018-03-23 ENCOUNTER — TELEPHONE (OUTPATIENT)
Dept: FAMILY MEDICINE CLINIC | Facility: CLINIC | Age: 43
End: 2018-03-23

## 2018-03-23 DIAGNOSIS — R07.9 CHEST PAIN, UNSPECIFIED TYPE: Primary | ICD-10-CM

## 2018-03-23 DIAGNOSIS — G89.4 CHRONIC PAIN SYNDROME: ICD-10-CM

## 2018-03-23 RX ORDER — HYDROCODONE BITARTRATE AND ACETAMINOPHEN 10; 325 MG/1; MG/1
1 TABLET ORAL EVERY 6 HOURS PRN
Qty: 120 TABLET | Refills: 0 | Status: SHIPPED | OUTPATIENT
Start: 2018-03-23 | End: 2018-04-27 | Stop reason: SDUPTHER

## 2018-03-23 NOTE — TELEPHONE ENCOUNTER
Pt called and states his stress test was denied by insurance - the place called and cancelled his appt

## 2018-04-18 ENCOUNTER — OFFICE VISIT (OUTPATIENT)
Dept: CARDIOLOGY CLINIC | Facility: CLINIC | Age: 43
End: 2018-04-18
Payer: COMMERCIAL

## 2018-04-18 VITALS
WEIGHT: 206 LBS | HEART RATE: 60 BPM | DIASTOLIC BLOOD PRESSURE: 96 MMHG | HEIGHT: 72 IN | SYSTOLIC BLOOD PRESSURE: 146 MMHG | OXYGEN SATURATION: 100 % | BODY MASS INDEX: 27.9 KG/M2

## 2018-04-18 DIAGNOSIS — R07.9 CHEST PAIN, UNSPECIFIED TYPE: ICD-10-CM

## 2018-04-18 DIAGNOSIS — E78.1 ESSENTIAL HYPERTRIGLYCERIDEMIA: Primary | ICD-10-CM

## 2018-04-18 DIAGNOSIS — I20.0 UNSTABLE ANGINA PECTORIS (HCC): ICD-10-CM

## 2018-04-18 DIAGNOSIS — R06.02 SHORTNESS OF BREATH: ICD-10-CM

## 2018-04-18 DIAGNOSIS — F17.200 SMOKING: ICD-10-CM

## 2018-04-18 DIAGNOSIS — I10 HYPERTENSION, UNCONTROLLED: ICD-10-CM

## 2018-04-18 PROBLEM — IMO0001 SMOKING: Status: ACTIVE | Noted: 2018-04-18

## 2018-04-18 PROCEDURE — 99243 OFF/OP CNSLTJ NEW/EST LOW 30: CPT | Performed by: INTERNAL MEDICINE

## 2018-04-18 PROCEDURE — 93000 ELECTROCARDIOGRAM COMPLETE: CPT | Performed by: INTERNAL MEDICINE

## 2018-04-18 RX ORDER — ATORVASTATIN CALCIUM 40 MG/1
20 TABLET, FILM COATED ORAL DAILY
Qty: 90 TABLET | Refills: 3 | Status: SHIPPED | OUTPATIENT
Start: 2018-04-18 | End: 2018-04-20 | Stop reason: SDUPTHER

## 2018-04-18 RX ORDER — NITROGLYCERIN 0.4 MG/1
0.4 TABLET SUBLINGUAL
Qty: 90 TABLET | Refills: 0 | Status: SHIPPED | OUTPATIENT
Start: 2018-04-18 | End: 2020-10-13

## 2018-04-18 RX ORDER — METOPROLOL SUCCINATE 25 MG/1
25 TABLET, EXTENDED RELEASE ORAL DAILY
Qty: 30 TABLET | Refills: 11 | Status: SHIPPED | OUTPATIENT
Start: 2018-04-18 | End: 2018-08-20 | Stop reason: SDUPTHER

## 2018-04-18 NOTE — PROGRESS NOTES
Cardiology Consultation     Arlene Beltrán  [id number]  1975  [address]  [facility] [address] [place] [id number]    1  Chest pain, unspecified type  Ambulatory referral to Cardiology    POCT ECG       C/c:  Chest pain    HPI:36year-old male with past medical history of  Hypertriglyceridemia, smoking is here for evaluation of chest pain  Patient is having chest pain for last 2 years but has recently worsened  He describes chest pain as pressure-like located in substernal region with radiation to bilateral arms and neck  It is moderate in intensity  Last 2 episodes were at night which woke him up from sleep  It usually lasts for about 5-10 minutes and resolved spontaneously with no treatment  It is associated with diaphoresis  And shortness of breath  He denies having other cardiac symptoms including palpitations, dizziness or syncope  EKG done in my office showed normal sinus rhythm and normal ST T wave  Lipid profile  Triglycerides 224, LDL 63, HDL 47     patient's maternal uncle had premature CAD in his 46s, patient had MI  Patient Active Problem List   Diagnosis    Anxiety and depression    Cervical radiculopathy    Essential hypertriglyceridemia    GERD (gastroesophageal reflux disease)    Lumbar radiculopathy    Nicotine dependence    Testicular hypogonadism    Vitamin D deficiency    Chest pain    Lateral epicondylitis of left elbow     Past Medical History:   Diagnosis Date    Anxiety     Chest pain     Chronic arm pain     Chronic back pain     GERD (gastroesophageal reflux disease)     Hyperlipidemia     Vitamin D deficiency      Social History     Social History    Marital status: /Civil Union     Spouse name: N/A    Number of children: N/A    Years of education: N/A     Occupational History    Not on file       Social History Main Topics    Smoking status: Current Every Day Smoker     Packs/day: 1 50     Years: 25 00     Types: Cigarettes    Smokeless tobacco: Never Used    Alcohol use 7 2 oz/week     12 Cans of beer per week    Drug use: No    Sexual activity: Not on file     Other Topics Concern    Not on file     Social History Narrative        Employed- Full Time[employer]   Lives at home with spouse       Family History   Problem Relation Age of Onset    No Known Problems Mother     Diabetes Father      Mellitus    Diabetes Paternal Uncle      Past Surgical History:   Procedure Laterality Date    NEUROPLASTY / TRANSPOSITION MEDIAN NERVE AT CARPAL TUNNEL      TENDON REPAIR      Extensor       Current Outpatient Prescriptions:     atorvastatin (LIPITOR) 20 mg tablet, Take 1 tablet by mouth daily, Disp: , Rfl:     DULoxetine (CYMBALTA) 60 mg delayed release capsule, 07/28  Kalpana Mort TAKE 1 CAPSULE BY MOUTH ONCE DAILY, Disp: 30 capsule, Rfl: 5    HYDROcodone-acetaminophen (NORCO)  mg per tablet, Take 1 tablet by mouth every 6 (six) hours as needed for moderate pain Earliest Fill Date: 3/23/18 Max Daily Amount: 4 tablets, Disp: 120 tablet, Rfl: 0    lansoprazole (PREVACID) 30 mg capsule, 04/18/17  Kalpana Mort TAKE 1 CAPSULE DAILY, Disp: 30 capsule, Rfl: 2    Cholecalciferol (D3-50) 46393 units capsule, Take 1 capsule by mouth once a week, Disp: , Rfl:   Allergies   Allergen Reactions    Other      Annotation - [id number]:  Wood Mites     Vitals:    04/18/18 1435   BP: 146/96   Pulse: 60   SpO2: 100%   Weight: 93 4 kg (206 lb)   Height: 6' (1 829 m)       Labs:  Lab on 03/20/2018   Component Date Value    WBC 03/20/2018 9 68     RBC 03/20/2018 5 19     Hemoglobin 03/20/2018 15 9     Hematocrit 03/20/2018 47 5     MCV 03/20/2018 92     MCH 03/20/2018 30 6     MCHC 03/20/2018 33 5     RDW 03/20/2018 14 0     MPV 03/20/2018 10 0     Platelets 92/49/1515 257     nRBC 03/20/2018 0     Neutrophils Relative 03/20/2018 51     Lymphocytes Relative 03/20/2018 31     Monocytes Relative 03/20/2018 13*    Eosinophils Relative 03/20/2018 4     Basophils Relative 03/20/2018 1     Neutrophils Absolute 03/20/2018 4 91     Lymphocytes Absolute 03/20/2018 3 01     Monocytes Absolute 03/20/2018 1 24*    Eosinophils Absolute 03/20/2018 0 38     Basophils Absolute 03/20/2018 0 11*    Sodium 03/20/2018 140     Potassium 03/20/2018 5 0     Chloride 03/20/2018 108     CO2 03/20/2018 27     Anion Gap 03/20/2018 5     BUN 03/20/2018 13     Creatinine 03/20/2018 0 83     Glucose, Fasting 03/20/2018 96     Calcium 03/20/2018 8 8     AST 03/20/2018 26     ALT 03/20/2018 44     Alkaline Phosphatase 03/20/2018 70     Total Protein 03/20/2018 7 3     Albumin 03/20/2018 3 9     Total Bilirubin 03/20/2018 0 99     eGFR 03/20/2018 108     Cholesterol 03/20/2018 155     Triglycerides 03/20/2018 224*    HDL, Direct 03/20/2018 47     LDL Calculated 03/20/2018 63     TSH 3RD GENERATON 03/20/2018 2 330     Vit D, 25-Hydroxy 03/20/2018 35 2      Imaging: No results found  Review of Systems:  Review of Systems   Constitutional: Positive for diaphoresis  Negative for fatigue  HENT: Negative for congestion and facial swelling  Eyes: Negative for photophobia and visual disturbance  Respiratory: Positive for chest tightness and shortness of breath  Cardiovascular: Positive for chest pain  Negative for palpitations and leg swelling  Gastrointestinal: Negative for abdominal pain and blood in stool  Endocrine: Negative for cold intolerance and heat intolerance  Musculoskeletal: Negative for arthralgias and myalgias  Skin: Negative for pallor and rash  Neurological: Negative for dizziness and syncope  Physical Exam:  Physical Exam   Constitutional: He is oriented to person, place, and time  He appears well-developed and well-nourished  HENT:   Head: Normocephalic and atraumatic     Eyes: Conjunctivae and EOM are normal  Pupils are equal, round, and reactive to light  Neck: Normal range of motion  Neck supple  No JVD present  No thyromegaly present  Cardiovascular: Normal rate, regular rhythm, normal heart sounds and intact distal pulses  Exam reveals no gallop and no friction rub  No murmur heard  Pulmonary/Chest: Effort normal  No respiratory distress  He has wheezes  He has no rales  Abdominal: Soft  Bowel sounds are normal  He exhibits no distension  There is no tenderness  Musculoskeletal: Normal range of motion  He exhibits no edema  Neurological: He is alert and oriented to person, place, and time  He has normal reflexes  Skin: Skin is warm and dry  Psychiatric: He has a normal mood and affect  Discussion/Summary:  1  Chest pain is concerning for angina from significant CAD  Patient has smoked for more than 25 years, has  Uncontrolled hypertriglyceridemia and has family history of  Premature CAD  I will get exercise nuclear stress test rule out ischemia   echocardiogram to evaluate for structural heart disease  recommended smoking cessation  I also recommended patient to go to ER if the chest pain is persistent in spite of taking sublingual nitroglycerin    2  HYPERTENSION   blood pressure elevated   I will add Toprol-XL which might also help with his chest pain     3   Hypertriglyceridemia:   I will increase the dose of Lipitor to 40 mg   I will check lipid profile next visit     follow-up with me in 4 months if the stress test is normal

## 2018-04-19 ENCOUNTER — TELEPHONE (OUTPATIENT)
Dept: CARDIOLOGY CLINIC | Facility: CLINIC | Age: 43
End: 2018-04-19

## 2018-04-19 NOTE — TELEPHONE ENCOUNTER
Pt saw Dr Aydin Bhatti yesterday and he changed his Atorvastatin  He was taking 20mg a day and Dr Aydin Bhatti wanted him to start taking 40mg a day, pt picked up meds and it is 40mg but the directions state to take half a pill a day so that would put him back at 20mg a day like he was taking before  Georgette Ormond Georgette Ormond Please advise

## 2018-04-19 NOTE — TELEPHONE ENCOUNTER
Spoke with patient and he stated the pharmacy only gave him 15 tablets, will call pharmacy tomorrow and straighten the error out

## 2018-04-20 DIAGNOSIS — R07.9 CHEST PAIN, UNSPECIFIED TYPE: ICD-10-CM

## 2018-04-20 RX ORDER — ATORVASTATIN CALCIUM 40 MG/1
40 TABLET, FILM COATED ORAL DAILY
Qty: 30 TABLET | Refills: 5 | Status: SHIPPED | OUTPATIENT
Start: 2018-04-20 | End: 2018-08-20 | Stop reason: SDUPTHER

## 2018-04-27 DIAGNOSIS — G89.4 CHRONIC PAIN SYNDROME: ICD-10-CM

## 2018-04-27 RX ORDER — HYDROCODONE BITARTRATE AND ACETAMINOPHEN 10; 325 MG/1; MG/1
1 TABLET ORAL EVERY 6 HOURS PRN
Qty: 120 TABLET | Refills: 0 | Status: SHIPPED | OUTPATIENT
Start: 2018-04-27 | End: 2018-05-25 | Stop reason: SDUPTHER

## 2018-04-27 NOTE — TELEPHONE ENCOUNTER
Need refill hydrocodone-aceta 10/325  Takes Q  6 hrs    Pharmacy [facility] [place]    Please advise    [phone]

## 2018-04-30 ENCOUNTER — TELEPHONE (OUTPATIENT)
Dept: CARDIOLOGY CLINIC | Facility: CLINIC | Age: 43
End: 2018-04-30

## 2018-04-30 NOTE — TELEPHONE ENCOUNTER
Cpt 93095 needs a peer to peer  I faxed Clinical Info but it wasn't enough  Pls call [phone] to try and get approved   If it does get approved please let me know the Auth # and dates when test is valid   Let me know the outcome   Thanks!!

## 2018-05-02 ENCOUNTER — OFFICE VISIT (OUTPATIENT)
Dept: URGENT CARE | Facility: CLINIC | Age: 43
End: 2018-05-02
Payer: COMMERCIAL

## 2018-05-02 VITALS
WEIGHT: 212 LBS | RESPIRATION RATE: 18 BRPM | HEART RATE: 83 BPM | TEMPERATURE: 99.6 F | DIASTOLIC BLOOD PRESSURE: 84 MMHG | OXYGEN SATURATION: 97 % | SYSTOLIC BLOOD PRESSURE: 122 MMHG | BODY MASS INDEX: 28.75 KG/M2

## 2018-05-02 DIAGNOSIS — R07.9 CHEST PAIN, UNSPECIFIED TYPE: Primary | ICD-10-CM

## 2018-05-02 DIAGNOSIS — M79.631 PAIN OF RIGHT FOREARM: Primary | ICD-10-CM

## 2018-05-02 PROCEDURE — 99213 OFFICE O/P EST LOW 20 MIN: CPT | Performed by: NURSE PRACTITIONER

## 2018-05-02 RX ORDER — IBUPROFEN 800 MG/1
800 TABLET ORAL EVERY 8 HOURS PRN
Qty: 30 TABLET | Refills: 0 | Status: SHIPPED | OUTPATIENT
Start: 2018-05-02 | End: 2018-08-20 | Stop reason: HOSPADM

## 2018-05-02 NOTE — TELEPHONE ENCOUNTER
I was able to get stress echo approved # [id number] through 06/25    I put in order for stress echo       I don't know why but they would only let me do one test? So at this time I don't think regular echo is approved-- the case seems like it was closed and they would only allow me to re-open one

## 2018-05-02 NOTE — PROGRESS NOTES
[facility]        NAME: Nish Zarate is a 36 y o  male  : 1975    MRN: [id number]  DATE: May 2, 2018  TIME: 2:08 PM    Assessment and Plan   Pain of right forearm [M79 631]  1  Pain of right forearm  ibuprofen (MOTRIN) 800 mg tablet         Patient Instructions   Instructed patient to use NSAIDs rest ice elevate and follow up with PCP in 3-5 days proceed to ED if symptoms worsen    Follow up with PCP in 3-5 days  Proceed to  ER if symptoms worsen  Chief Complaint     Chief Complaint   Patient presents with    Numbness     c/o numbness right forearm and hand for  4 days; no injury; no pain but decreased strength         History of Present Illness       48-year-old male presents urgent care with chief complaint of pain/numbness right forearm extending into right hand for the past 4 days  Denies any falls injury trauma  He has not used any over-the-counter medications reports symptoms have been waxing and waning for the past 4 days        Review of Systems   Review of Systems   Constitutional: Negative  HENT: Negative  Eyes: Negative  Respiratory: Negative  Cardiovascular: Negative  Gastrointestinal: Negative  Endocrine: Negative  Genitourinary: Negative  Musculoskeletal: Positive for myalgias (right arm pain )  Skin: Negative  Allergic/Immunologic: Negative  Neurological: Negative  Hematological: Negative  Psychiatric/Behavioral: Negative  All other systems reviewed and are negative  Current Medications       Current Outpatient Prescriptions:     aspirin 81 MG tablet, Take 1 tablet (81 mg total) by mouth daily, Disp: 90 tablet, Rfl: 3    atorvastatin (LIPITOR) 40 mg tablet, Take 1 tablet (40 mg total) by mouth daily, Disp: 30 tablet, Rfl: 5    Cholecalciferol (D3-50) 29563 units capsule, Take 1 capsule by mouth once a week, Disp: , Rfl:     DULoxetine (CYMBALTA) 60 mg delayed release capsule,   Francie Vides TAKE 1 CAPSULE BY MOUTH ONCE DAILY, Disp: 30 capsule, Rfl: 5    HYDROcodone-acetaminophen (NORCO)  mg per tablet, Take 1 tablet by mouth every 6 (six) hours as needed for moderate pain Earliest Fill Date: 4/27/18 Max Daily Amount: 4 tablets, Disp: 120 tablet, Rfl: 0    ibuprofen (MOTRIN) 800 mg tablet, Take 1 tablet (800 mg total) by mouth every 8 (eight) hours as needed for mild pain for up to 10 days, Disp: 30 tablet, Rfl: 0    lansoprazole (PREVACID) 30 mg capsule, 04/18/17  Rosella Goldmann TAKE 1 CAPSULE DAILY, Disp: 30 capsule, Rfl: 2    metoprolol succinate (TOPROL-XL) 25 mg 24 hr tablet, Take 1 tablet (25 mg total) by mouth daily, Disp: 30 tablet, Rfl: 11    nitroglycerin (NITROSTAT) 0 4 mg SL tablet, Place 1 tablet (0 4 mg total) under the tongue every 5 (five) minutes as needed for chest pain, Disp: 90 tablet, Rfl: 0    Current Allergies     Allergies as of 05/02/2018 - Reviewed 05/02/2018   Allergen Reaction Noted    Other  06/23/2014            The following portions of the patient's history were reviewed and updated as appropriate: allergies, current medications, past family history, past medical history, past social history, past surgical history and problem list      Past Medical History:   Diagnosis Date    Anxiety     Chest pain     Chronic arm pain     Chronic back pain     GERD (gastroesophageal reflux disease)     Hyperlipidemia     Vitamin D deficiency        Past Surgical History:   Procedure Laterality Date    NEUROPLASTY / TRANSPOSITION MEDIAN NERVE AT CARPAL TUNNEL      TENDON REPAIR      Extensor       Family History   Problem Relation Age of Onset    No Known Problems Mother     Diabetes Father      Mellitus    Diabetes Paternal Uncle          Medications have been verified  Objective   /84   Pulse 83   Temp 99 6 °F (37 6 °C)   Resp 18   Wt 96 2 kg (212 lb)   SpO2 97%   BMI 28 75 kg/m²        Physical Exam     Physical Exam   Constitutional: He is oriented to person, place, and time   Vital signs are normal  He appears well-developed  HENT:   Head: Normocephalic and atraumatic  Right Ear: External ear normal    Left Ear: External ear normal    Nose: Nose normal    Mouth/Throat: Oropharynx is clear and moist    Eyes: Conjunctivae and EOM are normal  Pupils are equal, round, and reactive to light  Lids are everted and swept, no foreign bodies found  Neck: Normal range of motion  Neck supple  Cardiovascular: Normal rate, regular rhythm, normal heart sounds and intact distal pulses  Brisk capillary refill noted right hand 1st through 5th digits   Pulmonary/Chest: Effort normal and breath sounds normal    Abdominal: Normal appearance  Musculoskeletal: Normal range of motion  Right elbow: Normal        Right wrist: He exhibits swelling  He exhibits normal range of motion, no tenderness, no bony tenderness, no effusion, no crepitus, no deformity and no laceration  Neurological: He is alert and oriented to person, place, and time  He has normal strength and normal reflexes  No cranial nerve deficit or sensory deficit  He displays a negative Romberg sign  Skin: Skin is warm, dry and intact  Psychiatric: He has a normal mood and affect  His speech is normal and behavior is normal  Judgment and thought content normal    Nursing note and vitals reviewed

## 2018-05-02 NOTE — PATIENT INSTRUCTIONS
Instructed patient to use NSAIDs rest ice elevate and follow up with PCP in 3-5 days proceed to ED if symptoms worsen, due to no injury falls or trauma no x-ray completed this time he does have good range of motion hand strength is equal and strong bilaterally

## 2018-05-03 ENCOUNTER — TELEPHONE (OUTPATIENT)
Dept: CARDIOLOGY CLINIC | Facility: CLINIC | Age: 43
End: 2018-05-03

## 2018-05-03 NOTE — TELEPHONE ENCOUNTER
Dr Dylan Germain is ok with getting the stress echo and patient can walk on treadmill  Pt is scheduled already for echo and NST so if he cannot do SE on that day, please call patient and let him know

## 2018-05-03 NOTE — TELEPHONE ENCOUNTER
[facility] so the end result is that he has nuke stress (not stress echo) and regular echo approved    [id number] exp: 07/01/2018

## 2018-05-03 NOTE — TELEPHONE ENCOUNTER
I assumed peer to peer was for both bc it was not specified for which test he needed peer to peer  When I spoke with the rep I said he had both ordered, but he said we didn't have enough to get nuke approved but he could get regular echo or echo stress    Are they able to take back an auth like that? I can call back if we want to get it switched to the nuke and the regular echo  I don't know why he wouldn't have said that the nuke was already approved

## 2018-05-03 NOTE — TELEPHONE ENCOUNTER
I did get approval for nuke so whatever would be preferred at this point  They tried to say that I withdrew that I wanted the nuke done which I did NOT       Currently on phone now to do peer to peer for regular echo

## 2018-05-03 NOTE — TELEPHONE ENCOUNTER
Spoke with Dr Namita Lagunas and he said a stress echo is fine  Called patient andlet him know insurance approved a stress echo asked if he could walk on treadmill and he stated he could  I will call downstairs and let Gary Clamp know

## 2018-05-17 ENCOUNTER — HOSPITAL ENCOUNTER (OUTPATIENT)
Dept: NON INVASIVE DIAGNOSTICS | Facility: CLINIC | Age: 43
Discharge: HOME | End: 2018-05-17
Payer: COMMERCIAL

## 2018-05-17 ENCOUNTER — TELEPHONE (OUTPATIENT)
Dept: CARDIOLOGY CLINIC | Facility: CLINIC | Age: 43
End: 2018-05-17

## 2018-05-17 DIAGNOSIS — I20.0 UNSTABLE ANGINA PECTORIS (HCC): ICD-10-CM

## 2018-05-17 DIAGNOSIS — E78.1 ESSENTIAL HYPERTRIGLYCERIDEMIA: ICD-10-CM

## 2018-05-17 DIAGNOSIS — I10 HYPERTENSION, UNCONTROLLED: ICD-10-CM

## 2018-05-17 LAB
ARRHY DURING EX: NORMAL
CHEST PAIN STATEMENT: NORMAL
MAX DIASTOLIC BP: 78 MMHG
MAX HEART RATE: 148 BPM
MAX PREDICTED HEART RATE: 177 BPM
MAX. SYSTOLIC BP: 188 MMHG
PROTOCOL NAME: NORMAL
REASON FOR TERMINATION: NORMAL
TARGET HR FORMULA: NORMAL
TEST INDICATION: NORMAL
TIME IN EXERCISE PHASE: NORMAL

## 2018-05-17 PROCEDURE — 78452 HT MUSCLE IMAGE SPECT MULT: CPT

## 2018-05-17 PROCEDURE — 93018 CV STRESS TEST I&R ONLY: CPT | Performed by: INTERNAL MEDICINE

## 2018-05-17 PROCEDURE — 93306 TTE W/DOPPLER COMPLETE: CPT | Performed by: INTERNAL MEDICINE

## 2018-05-17 PROCEDURE — 93016 CV STRESS TEST SUPVJ ONLY: CPT | Performed by: INTERNAL MEDICINE

## 2018-05-17 PROCEDURE — 78452 HT MUSCLE IMAGE SPECT MULT: CPT | Performed by: INTERNAL MEDICINE

## 2018-05-17 PROCEDURE — 93017 CV STRESS TEST TRACING ONLY: CPT

## 2018-05-17 PROCEDURE — 93306 TTE W/DOPPLER COMPLETE: CPT

## 2018-05-17 PROCEDURE — A9502 TC99M TETROFOSMIN: HCPCS

## 2018-05-17 RX ORDER — AMINOPHYLLINE DIHYDRATE 25 MG/ML
50 INJECTION, SOLUTION INTRAVENOUS ONCE
Status: CANCELLED | OUTPATIENT
Start: 2018-05-17 | End: 2018-05-17

## 2018-05-17 NOTE — TELEPHONE ENCOUNTER
He should take Lipitor 40 mg and continue to take Toprol-XL which has started last visit and follow up with me in 4 months    I might repeat lipid profile at that time

## 2018-05-17 NOTE — TELEPHONE ENCOUNTER
s/w pt regarding echo and stress advised both test looked good  Pt asking of should continue with doubling his cholesterol med and taking his BP medication  Please review and advise, thanks

## 2018-05-18 NOTE — TELEPHONE ENCOUNTER
Spoke with patient letting him know he should continue his Lipitor and Metoprolol and follow up with Dr Yolanda Ochoa in 4 months  Can you put patient on the 4 month call list please? Thanks

## 2018-05-25 DIAGNOSIS — G89.4 CHRONIC PAIN SYNDROME: ICD-10-CM

## 2018-05-25 RX ORDER — HYDROCODONE BITARTRATE AND ACETAMINOPHEN 10; 325 MG/1; MG/1
1 TABLET ORAL EVERY 6 HOURS PRN
Qty: 120 TABLET | Refills: 0 | Status: SHIPPED | OUTPATIENT
Start: 2018-05-25 | End: 2018-06-25 | Stop reason: SDUPTHER

## 2018-05-25 RX ORDER — HYDROCODONE BITARTRATE AND ACETAMINOPHEN 10; 325 MG/1; MG/1
1 TABLET ORAL EVERY 6 HOURS PRN
Qty: 120 TABLET | Refills: 0 | Status: SHIPPED | OUTPATIENT
Start: 2018-05-25 | End: 2018-05-25 | Stop reason: SDUPTHER

## 2018-06-21 DIAGNOSIS — K21.9 GASTROESOPHAGEAL REFLUX DISEASE WITHOUT ESOPHAGITIS: ICD-10-CM

## 2018-06-21 RX ORDER — LANSOPRAZOLE 30 MG/1
CAPSULE, DELAYED RELEASE ORAL
Qty: 30 CAPSULE | Refills: 2 | Status: SHIPPED | OUTPATIENT
Start: 2018-06-21 | End: 2018-08-20 | Stop reason: SDUPTHER

## 2018-06-25 DIAGNOSIS — G89.4 CHRONIC PAIN SYNDROME: ICD-10-CM

## 2018-06-25 RX ORDER — HYDROCODONE BITARTRATE AND ACETAMINOPHEN 10; 325 MG/1; MG/1
1 TABLET ORAL EVERY 6 HOURS PRN
Qty: 120 TABLET | Refills: 0 | Status: SHIPPED | OUTPATIENT
Start: 2018-06-25 | End: 2018-07-25 | Stop reason: SDUPTHER

## 2018-07-25 DIAGNOSIS — G89.4 CHRONIC PAIN SYNDROME: ICD-10-CM

## 2018-07-25 RX ORDER — HYDROCODONE BITARTRATE AND ACETAMINOPHEN 10; 325 MG/1; MG/1
1 TABLET ORAL EVERY 6 HOURS PRN
Qty: 120 TABLET | Refills: 0 | Status: SHIPPED | OUTPATIENT
Start: 2018-07-25 | End: 2018-08-20 | Stop reason: SDUPTHER

## 2018-07-29 DIAGNOSIS — F33.41 RECURRENT MAJOR DEPRESSIVE DISORDER, IN PARTIAL REMISSION (HCC): ICD-10-CM

## 2018-07-30 RX ORDER — DULOXETIN HYDROCHLORIDE 60 MG/1
CAPSULE, DELAYED RELEASE ORAL
Qty: 30 CAPSULE | Refills: 2 | Status: SHIPPED | OUTPATIENT
Start: 2018-07-30 | End: 2018-08-20 | Stop reason: SDUPTHER

## 2018-08-20 ENCOUNTER — OFFICE VISIT (OUTPATIENT)
Dept: FAMILY MEDICINE CLINIC | Facility: CLINIC | Age: 43
End: 2018-08-20
Payer: COMMERCIAL

## 2018-08-20 VITALS
DIASTOLIC BLOOD PRESSURE: 74 MMHG | HEART RATE: 89 BPM | BODY MASS INDEX: 28.85 KG/M2 | OXYGEN SATURATION: 96 % | HEIGHT: 72 IN | WEIGHT: 213 LBS | SYSTOLIC BLOOD PRESSURE: 124 MMHG

## 2018-08-20 DIAGNOSIS — E78.1 ESSENTIAL HYPERTRIGLYCERIDEMIA: ICD-10-CM

## 2018-08-20 DIAGNOSIS — R07.9 CHEST PAIN, UNSPECIFIED TYPE: ICD-10-CM

## 2018-08-20 DIAGNOSIS — G89.4 CHRONIC PAIN SYNDROME: ICD-10-CM

## 2018-08-20 DIAGNOSIS — Z00.00 WELLNESS EXAMINATION: ICD-10-CM

## 2018-08-20 DIAGNOSIS — I10 HYPERTENSION, UNCONTROLLED: ICD-10-CM

## 2018-08-20 DIAGNOSIS — K21.9 GASTROESOPHAGEAL REFLUX DISEASE WITHOUT ESOPHAGITIS: ICD-10-CM

## 2018-08-20 DIAGNOSIS — F17.209 NICOTINE DEPENDENCE WITH NICOTINE-INDUCED DISORDER, UNSPECIFIED NICOTINE PRODUCT TYPE: ICD-10-CM

## 2018-08-20 DIAGNOSIS — F33.41 RECURRENT MAJOR DEPRESSIVE DISORDER, IN PARTIAL REMISSION (HCC): ICD-10-CM

## 2018-08-20 DIAGNOSIS — I20.0 UNSTABLE ANGINA PECTORIS (HCC): Primary | ICD-10-CM

## 2018-08-20 DIAGNOSIS — F41.9 ANXIETY: ICD-10-CM

## 2018-08-20 PROCEDURE — 3074F SYST BP LT 130 MM HG: CPT | Performed by: FAMILY MEDICINE

## 2018-08-20 PROCEDURE — 99203 OFFICE O/P NEW LOW 30 MIN: CPT | Performed by: FAMILY MEDICINE

## 2018-08-20 PROCEDURE — 3078F DIAST BP <80 MM HG: CPT | Performed by: FAMILY MEDICINE

## 2018-08-20 PROCEDURE — 99396 PREV VISIT EST AGE 40-64: CPT | Performed by: FAMILY MEDICINE

## 2018-08-20 PROCEDURE — 3008F BODY MASS INDEX DOCD: CPT | Performed by: FAMILY MEDICINE

## 2018-08-20 RX ORDER — LANSOPRAZOLE 30 MG/1
30 CAPSULE, DELAYED RELEASE ORAL DAILY
Qty: 90 CAPSULE | Refills: 3 | Status: SHIPPED | OUTPATIENT
Start: 2018-08-20 | End: 2020-10-13 | Stop reason: SDUPTHER

## 2018-08-20 RX ORDER — HYDROCODONE BITARTRATE AND ACETAMINOPHEN 10; 325 MG/1; MG/1
1 TABLET ORAL EVERY 6 HOURS PRN
Qty: 120 TABLET | Refills: 0 | Status: SHIPPED | OUTPATIENT
Start: 2018-08-20 | End: 2018-09-21 | Stop reason: SDUPTHER

## 2018-08-20 RX ORDER — ATORVASTATIN CALCIUM 40 MG/1
40 TABLET, FILM COATED ORAL DAILY
Qty: 90 TABLET | Refills: 3 | Status: SHIPPED | OUTPATIENT
Start: 2018-08-20 | End: 2021-06-22 | Stop reason: SDUPTHER

## 2018-08-20 RX ORDER — DULOXETIN HYDROCHLORIDE 60 MG/1
60 CAPSULE, DELAYED RELEASE ORAL DAILY
Qty: 90 CAPSULE | Refills: 3 | Status: SHIPPED | OUTPATIENT
Start: 2018-08-20 | End: 2019-08-28 | Stop reason: SDUPTHER

## 2018-08-20 RX ORDER — METOPROLOL SUCCINATE 25 MG/1
25 TABLET, EXTENDED RELEASE ORAL DAILY
Qty: 90 TABLET | Refills: 3 | Status: SHIPPED | OUTPATIENT
Start: 2018-08-20 | End: 2020-10-13 | Stop reason: ALTCHOICE

## 2018-08-20 NOTE — PROGRESS NOTES
HPI:  Joleen Dancer is a 36 y o  male here for his yearly health maintenance exam    Patient Active Problem List   Diagnosis    Anxiety and depression    Cervical radiculopathy    Essential hypertriglyceridemia    GERD (gastroesophageal reflux disease)    Lumbar radiculopathy    Nicotine dependence    Testicular hypogonadism    Vitamin D deficiency    Unstable angina pectoris (HCC)    Lateral epicondylitis of left elbow    Hypertension, uncontrolled    Smoking    Shortness of breath     Past Medical History:   Diagnosis Date    Anxiety     Chest pain     Chronic arm pain     Chronic back pain     GERD (gastroesophageal reflux disease)     Hyperlipidemia     Vitamin D deficiency        1  Advanced Directive: n     2  Durable Power of  for Healthcare: n     3  Social History:           Drug and alcohol History: ny                  4  Immunizations up to date: y                 Lifestyle:                           Healthy Diet:y                          Alcohol Use:y                          Tobacco Use:y                          Regular exercise:yn                          Weight concerns:n                               5   Over the past 2 weeks, how often have you been bothered by the following:              Little interest or pleasure in doing things:n              Felling down, depressed or hopeless:n       Current Outpatient Prescriptions   Medication Sig Dispense Refill    aspirin 81 MG tablet Take 1 tablet (81 mg total) by mouth daily 90 tablet 3    atorvastatin (LIPITOR) 40 mg tablet Take 1 tablet (40 mg total) by mouth daily 90 tablet 3    Cholecalciferol (D3-50) 39384 units capsule Take 1 capsule by mouth once a week      DULoxetine (CYMBALTA) 60 mg delayed release capsule Take 1 capsule (60 mg total) by mouth daily 90 capsule 3    HYDROcodone-acetaminophen (NORCO)  mg per tablet Take 1 tablet by mouth every 6 (six) hours as needed (pain) Dr Jay Rahman 120 tablet 0    lansoprazole (PREVACID) 30 mg capsule Take 1 capsule (30 mg total) by mouth daily 90 capsule 3    metoprolol succinate (TOPROL-XL) 25 mg 24 hr tablet Take 1 tablet (25 mg total) by mouth daily 90 tablet 3    nitroglycerin (NITROSTAT) 0 4 mg SL tablet Place 1 tablet (0 4 mg total) under the tongue every 5 (five) minutes as needed for chest pain 90 tablet 0     No current facility-administered medications for this visit  Allergies   Allergen Reactions    Other      Annotation - [id number]: 11 [facility]     Immunization History   Administered Date(s) Administered    Influenza 10/02/2015    Influenza Quadrivalent Preservative Free 3 years and older IM 10/02/2015    Tdap 05/01/2015       Patient Care Team:  Priyanka Morgan MD as PCP - General (Family Medicine)  Callie Soria PA-C      Physical Exam :  Physical Exam     Reviewed Updated [facility] Prior Wellness Visits:   Last Health Maintenance visit information was reviewed, patient interviewed , no change since last HM visit yes  Last HM visit information was reviewed, patient interviewed and updates made to the record today yes    Assessment and Plan:  1  Unstable angina pectoris ([address] )     2  Hypertension, uncontrolled     3  Nicotine dependence with nicotine-induced disorder, unspecified nicotine product type     4  Anxiety     5  Recurrent major depressive disorder, in partial remission (HCC)  DULoxetine (CYMBALTA) 60 mg delayed release capsule   6  Chest pain, unspecified type  atorvastatin (LIPITOR) 40 mg tablet    metoprolol succinate (TOPROL-XL) 25 mg 24 hr tablet   7  Essential hypertriglyceridemia  metoprolol succinate (TOPROL-XL) 25 mg 24 hr tablet   8  Gastroesophageal reflux disease without esophagitis  lansoprazole (PREVACID) 30 mg capsule   9  Chronic pain syndrome  HYDROcodone-acetaminophen (NORCO)  mg per tablet   10   Wellness examination         Health Maintenance Due   Topic Date Due    HIV SCREENING  1975    Pneumococcal PPSV23 Medium Risk Adult (1 of 1 - PPSV23) 10/06/1993

## 2018-08-20 NOTE — PROGRESS NOTES
[id number]         NAME: Alanis Cohen is a 36 y o  male  : 1975    MRN: [id number]  DATE: 2018  TIME: 9:59 AM    Assessment and Plan   Unstable angina pectoris ([address] ) [I20 0]  1  Unstable angina pectoris ([address] )     2  Hypertension, uncontrolled     3  Nicotine dependence with nicotine-induced disorder, unspecified nicotine product type     4  Anxiety     5  Recurrent major depressive disorder, in partial remission ([facility])  DULoxetine (CYMBALTA) 60 mg delayed release capsule   6  Chest pain, unspecified type  atorvastatin (LIPITOR) 40 mg tablet    metoprolol succinate (TOPROL-XL) 25 mg 24 hr tablet   7  Essential hypertriglyceridemia  metoprolol succinate (TOPROL-XL) 25 mg 24 hr tablet   8  Gastroesophageal reflux disease without esophagitis  lansoprazole (PREVACID) 30 mg capsule   9  Chronic pain syndrome  HYDROcodone-acetaminophen (NORCO)  mg per tablet         Patient Instructions     Patient Instructions   F/u cardiol          Chief Complaint     Chief Complaint   Patient presents with    New PT     est care--med refills         History of Present Illness       Est medical care        Review of Systems   Review of Systems   Constitutional: Negative for fatigue, fever and unexpected weight change  HENT: Negative for congestion, sinus pain and sore throat  Eyes: Negative for visual disturbance  Respiratory: Negative for shortness of breath and wheezing  Cardiovascular: Negative for chest pain and palpitations  Gastrointestinal: Negative for abdominal pain, nausea and vomiting  Musculoskeletal: Negative  Negative for arthralgias and myalgias  L ext forearm muscle pain   Neurological: Negative for syncope, weakness and numbness  Psychiatric/Behavioral: Negative  Negative for confusion, dysphoric mood and suicidal ideas           Current Medications       Current Outpatient Prescriptions:     aspirin 81 MG tablet, Take 1 tablet (81 mg total) by mouth daily, Disp: 90 tablet, Rfl: 3    atorvastatin (LIPITOR) 40 mg tablet, Take 1 tablet (40 mg total) by mouth daily, Disp: 90 tablet, Rfl: 3    Cholecalciferol (D3-50) 29109 units capsule, Take 1 capsule by mouth once a week, Disp: , Rfl:     DULoxetine (CYMBALTA) 60 mg delayed release capsule, Take 1 capsule (60 mg total) by mouth daily, Disp: 90 capsule, Rfl: 3    HYDROcodone-acetaminophen (NORCO)  mg per tablet, Take 1 tablet by mouth every 6 (six) hours as needed (pain) Dr Cedric Tolliver Supervising, Disp: 120 tablet, Rfl: 0    lansoprazole (PREVACID) 30 mg capsule, Take 1 capsule (30 mg total) by mouth daily, Disp: 90 capsule, Rfl: 3    metoprolol succinate (TOPROL-XL) 25 mg 24 hr tablet, Take 1 tablet (25 mg total) by mouth daily, Disp: 90 tablet, Rfl: 3    nitroglycerin (NITROSTAT) 0 4 mg SL tablet, Place 1 tablet (0 4 mg total) under the tongue every 5 (five) minutes as needed for chest pain, Disp: 90 tablet, Rfl: 0    Current Allergies     Allergies as of 08/20/2018 - Reviewed 08/20/2018   Allergen Reaction Noted    Other  06/23/2014            The following portions of the patient's history were reviewed and updated as appropriate: allergies, current medications, past family history, past medical history, past social history, past surgical history and problem list      Past Medical History:   Diagnosis Date    Anxiety     Chest pain     Chronic arm pain     Chronic back pain     GERD (gastroesophageal reflux disease)     Hyperlipidemia     Vitamin D deficiency        Past Surgical History:   Procedure Laterality Date    NEUROPLASTY / TRANSPOSITION MEDIAN NERVE AT CARPAL TUNNEL      TENDON REPAIR      Extensor       Family History   Problem Relation Age of Onset    No Known Problems Mother     Diabetes Father         Mellitus    Diabetes Paternal Uncle          Medications have been verified          Objective   /74   Pulse 89   Ht 6' (1 829 m)   Wt 96 6 kg (213 lb)   SpO2 96%   BMI 28 89 kg/m²        Physical Exam     Physical Exam   Constitutional: He is oriented to person, place, and time  Vital signs are normal  He appears well-developed and well-nourished  HENT:   Right Ear: Ear canal normal  Tympanic membrane is not injected  Left Ear: Ear canal normal  Tympanic membrane is not injected  Nose: Nose normal    Mouth/Throat: Oropharynx is clear and moist    Eyes: Conjunctivae and EOM are normal  Pupils are equal, round, and reactive to light  Right eye exhibits no discharge  Left eye exhibits no discharge  Neck: Normal range of motion  Neck supple  No thyromegaly present  Cardiovascular: Normal rate, regular rhythm and normal heart sounds  No murmur heard  Pulmonary/Chest: Effort normal and breath sounds normal  No respiratory distress  He has no wheezes  Abdominal: Soft  Bowel sounds are normal  He exhibits no distension  There is no tenderness  Musculoskeletal: Normal range of motion  He exhibits tenderness  Lymphadenopathy:     He has no cervical adenopathy  Neurological: He is alert and oriented to person, place, and time  He has normal strength and normal reflexes  He is not disoriented  No sensory deficit  Gait normal    Skin: Skin is warm and dry  Psychiatric: He has a normal mood and affect   His speech is normal and behavior is normal  Judgment and thought content normal  Cognition and memory are normal        L forearm tenderness at lat epicondyle--prev Hx tear

## 2018-09-21 DIAGNOSIS — G89.4 CHRONIC PAIN SYNDROME: ICD-10-CM

## 2018-09-21 RX ORDER — HYDROCODONE BITARTRATE AND ACETAMINOPHEN 10; 325 MG/1; MG/1
1 TABLET ORAL EVERY 6 HOURS PRN
Qty: 120 TABLET | Refills: 0 | Status: SHIPPED | OUTPATIENT
Start: 2018-09-21 | End: 2018-10-22 | Stop reason: SDUPTHER

## 2018-10-22 DIAGNOSIS — G89.4 CHRONIC PAIN SYNDROME: ICD-10-CM

## 2018-10-22 RX ORDER — HYDROCODONE BITARTRATE AND ACETAMINOPHEN 10; 325 MG/1; MG/1
1 TABLET ORAL EVERY 6 HOURS PRN
Qty: 120 TABLET | Refills: 0 | Status: SHIPPED | OUTPATIENT
Start: 2018-10-22 | End: 2018-11-20 | Stop reason: SDUPTHER

## 2018-11-20 DIAGNOSIS — G89.4 CHRONIC PAIN SYNDROME: ICD-10-CM

## 2018-11-20 RX ORDER — HYDROCODONE BITARTRATE AND ACETAMINOPHEN 10; 325 MG/1; MG/1
1 TABLET ORAL EVERY 6 HOURS PRN
Qty: 120 TABLET | Refills: 0 | Status: SHIPPED | OUTPATIENT
Start: 2018-11-20 | End: 2018-12-19 | Stop reason: SDUPTHER

## 2018-12-19 DIAGNOSIS — G89.4 CHRONIC PAIN SYNDROME: ICD-10-CM

## 2018-12-19 RX ORDER — HYDROCODONE BITARTRATE AND ACETAMINOPHEN 10; 325 MG/1; MG/1
1 TABLET ORAL EVERY 6 HOURS PRN
Qty: 120 TABLET | Refills: 0 | Status: SHIPPED | OUTPATIENT
Start: 2018-12-19 | End: 2019-01-21 | Stop reason: SDUPTHER

## 2019-01-16 ENCOUNTER — OFFICE VISIT (OUTPATIENT)
Dept: URGENT CARE | Facility: CLINIC | Age: 44
End: 2019-01-16
Payer: COMMERCIAL

## 2019-01-16 VITALS
HEART RATE: 85 BPM | HEIGHT: 72 IN | SYSTOLIC BLOOD PRESSURE: 128 MMHG | WEIGHT: 215 LBS | RESPIRATION RATE: 22 BRPM | OXYGEN SATURATION: 97 % | BODY MASS INDEX: 29.12 KG/M2 | DIASTOLIC BLOOD PRESSURE: 70 MMHG | TEMPERATURE: 98.6 F

## 2019-01-16 DIAGNOSIS — M54.41 ACUTE BILATERAL LOW BACK PAIN WITH RIGHT-SIDED SCIATICA: Primary | ICD-10-CM

## 2019-01-16 PROCEDURE — 99213 OFFICE O/P EST LOW 20 MIN: CPT | Performed by: PHYSICIAN ASSISTANT

## 2019-01-16 RX ORDER — METHYLPREDNISOLONE 4 MG/1
TABLET ORAL
Qty: 21 TABLET | Refills: 0 | Status: SHIPPED | OUTPATIENT
Start: 2019-01-16 | End: 2020-10-13 | Stop reason: ALTCHOICE

## 2019-01-16 RX ORDER — BACLOFEN 10 MG/1
10 TABLET ORAL 3 TIMES DAILY
Qty: 15 TABLET | Refills: 0 | Status: SHIPPED | OUTPATIENT
Start: 2019-01-16 | End: 2021-02-03

## 2019-01-16 NOTE — PROGRESS NOTES
[facility]        NAME: Wanda Gregory is a 39 y o  male  : 1975    MRN: [id number]  DATE: 2019  TIME: 10:40 AM    Assessment and Plan   Acute bilateral low back pain with right-sided sciatica [M54 41]  1  Acute bilateral low back pain with right-sided sciatica  baclofen 10 mg tablet    Methylprednisolone 4 MG TBPK     Advised patient to continue follow-up with spine specialist   Patient needs an MRI  Patient Instructions       Follow up with PCP in 3-5 days  Proceed to  ER if symptoms worsen  Chief Complaint     Chief Complaint   Patient presents with    Back Pain     History of "pinched nerve" in back now has pain since  Saturday  History of Present Illness       70-year-old male presents with low back pain for 5 days  Patient states he has a significant history of sciatica  Patient states he was seeing spine specialist and was diagnosed with a pinched nerve in L5  He states he was to begin physical therapy I was unable to due to insurance issues  He drives 90 miles each way to work  He states driving makes the pain worse  He describes the pain as achy in the lumbar region occasionally radiates down the right side  He attributes the pain to working on his car last week when he believes he pulled his back  Denies loss of bowel or bladder function  Review of Systems   Review of Systems   Constitutional: Negative for chills, fatigue and fever  HENT: Negative for congestion, ear pain, sinus pain, sore throat and trouble swallowing  Eyes: Negative for pain, discharge and redness  Respiratory: Negative for cough, chest tightness, shortness of breath and wheezing  Cardiovascular: Negative for chest pain, palpitations and leg swelling  Gastrointestinal: Negative for abdominal pain, diarrhea, nausea and vomiting  Musculoskeletal: Positive for back pain  Negative for arthralgias, joint swelling and myalgias  Skin: Negative for rash  Neurological: Negative for dizziness, weakness, numbness and headaches           Current Medications       Current Outpatient Prescriptions:     aspirin 81 MG tablet, Take 1 tablet (81 mg total) by mouth daily, Disp: 90 tablet, Rfl: 3    atorvastatin (LIPITOR) 40 mg tablet, Take 1 tablet (40 mg total) by mouth daily, Disp: 90 tablet, Rfl: 3    Cholecalciferol (D3-50) 63277 units capsule, Take 1 capsule by mouth once a week, Disp: , Rfl:     DULoxetine (CYMBALTA) 60 mg delayed release capsule, Take 1 capsule (60 mg total) by mouth daily, Disp: 90 capsule, Rfl: 3    HYDROcodone-acetaminophen (NORCO)  mg per tablet, Take 1 tablet by mouth every 6 (six) hours as needed (pain) Dr Kian Miranda Supervising, Disp: 120 tablet, Rfl: 0    lansoprazole (PREVACID) 30 mg capsule, Take 1 capsule (30 mg total) by mouth daily, Disp: 90 capsule, Rfl: 3    metoprolol succinate (TOPROL-XL) 25 mg 24 hr tablet, Take 1 tablet (25 mg total) by mouth daily, Disp: 90 tablet, Rfl: 3    nitroglycerin (NITROSTAT) 0 4 mg SL tablet, Place 1 tablet (0 4 mg total) under the tongue every 5 (five) minutes as needed for chest pain, Disp: 90 tablet, Rfl: 0    baclofen 10 mg tablet, Take 1 tablet (10 mg total) by mouth 3 (three) times a day for 5 days, Disp: 15 tablet, Rfl: 0    Methylprednisolone 4 MG TBPK, Use as directed on package, Disp: 21 tablet, Rfl: 0    Current Allergies     Allergies as of 01/16/2019 - Reviewed 01/16/2019   Allergen Reaction Noted    Other  06/23/2014            The following portions of the patient's history were reviewed and updated as appropriate: allergies, current medications, past family history, past medical history, past social history, past surgical history and problem list      Past Medical History:   Diagnosis Date    Anxiety     Chest pain     Chronic arm pain     Chronic back pain     GERD (gastroesophageal reflux disease)     Hyperlipidemia     Vitamin D deficiency        Past Surgical History: Procedure Laterality Date    NEUROPLASTY / TRANSPOSITION MEDIAN NERVE AT CARPAL TUNNEL      TENDON REPAIR      Extensor       Family History   Problem Relation Age of Onset    No Known Problems Mother     Diabetes Father         Mellitus    Diabetes Paternal Uncle          Medications have been verified  Objective   /70   Pulse 85   Temp 98 6 °F (37 °C) (Tympanic)   Resp 22   Ht 6' (1 829 m)   Wt 97 5 kg (215 lb)   SpO2 97%   BMI 29 16 kg/m²        Physical Exam     Physical Exam   Constitutional: He is oriented to person, place, and time  He appears well-developed and well-nourished  No distress  HENT:   Head: Normocephalic  Right Ear: External ear normal    Left Ear: External ear normal    Mouth/Throat: Oropharynx is clear and moist    Eyes: Pupils are equal, round, and reactive to light  Conjunctivae and EOM are normal    Neck: Normal range of motion  Neck supple  Cardiovascular: Normal rate, regular rhythm and normal heart sounds  No murmur heard  Pulmonary/Chest: Effort normal and breath sounds normal  No respiratory distress  He has no wheezes  Abdominal: Soft  Bowel sounds are normal  There is no tenderness  Musculoskeletal: Normal range of motion  Cervical back: Normal         Thoracic back: Normal         Lumbar back: He exhibits pain and spasm  Lymphadenopathy:     He has no cervical adenopathy  Neurological: He is alert and oriented to person, place, and time  He has normal reflexes  Skin: Skin is warm and dry  Psychiatric: He has a normal mood and affect  Nursing note and vitals reviewed

## 2019-01-18 ENCOUNTER — OFFICE VISIT (OUTPATIENT)
Dept: FAMILY MEDICINE CLINIC | Facility: CLINIC | Age: 44
End: 2019-01-18
Payer: COMMERCIAL

## 2019-01-18 ENCOUNTER — TELEPHONE (OUTPATIENT)
Dept: FAMILY MEDICINE CLINIC | Facility: CLINIC | Age: 44
End: 2019-01-18

## 2019-01-18 VITALS
SYSTOLIC BLOOD PRESSURE: 122 MMHG | OXYGEN SATURATION: 98 % | BODY MASS INDEX: 29.8 KG/M2 | DIASTOLIC BLOOD PRESSURE: 78 MMHG | HEIGHT: 72 IN | WEIGHT: 220 LBS | HEART RATE: 84 BPM

## 2019-01-18 DIAGNOSIS — M54.50 ACUTE MIDLINE LOW BACK PAIN WITHOUT SCIATICA: Primary | ICD-10-CM

## 2019-01-18 DIAGNOSIS — M54.16 LUMBAR RADICULOPATHY: ICD-10-CM

## 2019-01-18 PROCEDURE — 3008F BODY MASS INDEX DOCD: CPT | Performed by: FAMILY MEDICINE

## 2019-01-18 PROCEDURE — 4004F PT TOBACCO SCREEN RCVD TLK: CPT | Performed by: FAMILY MEDICINE

## 2019-01-18 PROCEDURE — 99214 OFFICE O/P EST MOD 30 MIN: CPT | Performed by: FAMILY MEDICINE

## 2019-01-18 NOTE — TELEPHONE ENCOUNTER
Referral #: [id number]  Effective: 01/18/2019  Expires: 04/17/2019    Referral [id number] has been successfully submitted

## 2019-01-18 NOTE — PATIENT INSTRUCTIONS
Take the Medrol as advised  Continue the ibuprofen nausea needed basis  Use the hydrocodone sparingly  Physical therapy referral   Recheck back in 2-3 weeks into therapy  If not getting better would then be able to hopefully pre certified MRI  Did review progression as this is really a flare of her chronic problem in would not rush to get MRI  I also did discuss that this office who will be decrease in the amount of narcotics that we will prescribe for chronic pain  Incidentally, he did call back after he left from the visit requesting a refill on the hydrocodone  Was informed of policy  Dr Sanket Cotton will review his use when he returns in 2-3 days

## 2019-01-18 NOTE — PROGRESS NOTES
[id number]         NAME: Fiona Alejo is a 39 y o  male  : 1975    MRN: [id number]  DATE: 2019  TIME: 11:49 AM    Assessment and Plan   Acute midline low back pain without sciatica [M54 5]  1  Acute midline low back pain without sciatica  Ambulatory referral to Physical Therapy   2  Lumbar radiculopathy  Ambulatory referral to Physical Therapy       No problem-specific Assessment & Plan notes found for this encounter  Patient Instructions     Patient Instructions   Take the Medrol as advised  Continue the ibuprofen nausea needed basis  Use the hydrocodone sparingly  Physical therapy referral   Recheck back in 2-3 weeks into therapy  If not getting better would then be able to hopefully pre certified MRI  Did review progression as this is really a flare of her chronic problem in would not rush to get MRI  I also did discuss that this office who will be decrease in the amount of narcotics that we will prescribe for chronic pain  Incidentally, he did call back after he left from the visit requesting a refill on the hydrocodone  Was informed of policy  Dr Jen Reyes will review his use when he returns in 2-3 days  Chief Complaint     Chief Complaint   Patient presents with    Back Pain     for 1 week---would like to discuss need for MRI         History of Present Illness       Patient comes in today for further evaluation of lower back pain  He has had a flare over the last 1-2 weeks  Has had previous issues where he had been evaluated for by Sports Medicine  Had been recommended physical therapy but this had not been accomplished due to insurance issues  Most recent flare occurred after arising from couch  He does not report any specific injuries elsewhere and no work injury  Patient has been resting generally in using a heating pad  Patient has been using Advil 200 mg once daily    Also has hydrocodone which she takes on an as-needed basis usually twice daily  Has not had physical therapy  Review of Systems   Review of Systems   Constitutional: Negative for appetite change, fatigue and fever  Respiratory: Negative for cough, choking and shortness of breath  Cardiovascular: Negative for chest pain, palpitations and leg swelling  Musculoskeletal: Positive for arthralgias, back pain and gait problem  Negative for neck pain and neck stiffness  Neurological: Positive for weakness  Negative for dizziness, light-headedness and headaches  Psychiatric/Behavioral: Positive for sleep disturbance           Current Medications       Current Outpatient Prescriptions:     aspirin 81 MG tablet, Take 1 tablet (81 mg total) by mouth daily, Disp: 90 tablet, Rfl: 3    atorvastatin (LIPITOR) 40 mg tablet, Take 1 tablet (40 mg total) by mouth daily, Disp: 90 tablet, Rfl: 3    baclofen 10 mg tablet, Take 1 tablet (10 mg total) by mouth 3 (three) times a day for 5 days, Disp: 15 tablet, Rfl: 0    Cholecalciferol (D3-50) 29410 units capsule, Take 1 capsule by mouth once a week, Disp: , Rfl:     DULoxetine (CYMBALTA) 60 mg delayed release capsule, Take 1 capsule (60 mg total) by mouth daily, Disp: 90 capsule, Rfl: 3    HYDROcodone-acetaminophen (NORCO)  mg per tablet, Take 1 tablet by mouth every 6 (six) hours as needed (pain) Dr Royal Mohamud Supervising, Disp: 120 tablet, Rfl: 0    lansoprazole (PREVACID) 30 mg capsule, Take 1 capsule (30 mg total) by mouth daily, Disp: 90 capsule, Rfl: 3    Methylprednisolone 4 MG TBPK, Use as directed on package, Disp: 21 tablet, Rfl: 0    metoprolol succinate (TOPROL-XL) 25 mg 24 hr tablet, Take 1 tablet (25 mg total) by mouth daily, Disp: 90 tablet, Rfl: 3    nitroglycerin (NITROSTAT) 0 4 mg SL tablet, Place 1 tablet (0 4 mg total) under the tongue every 5 (five) minutes as needed for chest pain, Disp: 90 tablet, Rfl: 0    Current Allergies     Allergies as of 01/18/2019 - Reviewed 01/18/2019 Allergen Reaction Noted    Other  06/23/2014            The following portions of the patient's history were reviewed and updated as appropriate: allergies, current medications, past family history, past medical history, past social history, past surgical history and problem list      Past Medical History:   Diagnosis Date    Anxiety     Chest pain     Chronic arm pain     Chronic back pain     GERD (gastroesophageal reflux disease)     Hyperlipidemia     Vitamin D deficiency        Past Surgical History:   Procedure Laterality Date    NEUROPLASTY / TRANSPOSITION MEDIAN NERVE AT CARPAL TUNNEL      TENDON REPAIR      Extensor       Family History   Problem Relation Age of Onset    No Known Problems Mother     Diabetes Father         Mellitus    Diabetes Paternal Uncle          Medications have been verified  Objective   /78   Pulse 84   Ht 6' (1 829 m)   Wt 99 8 kg (220 lb)   SpO2 98%   BMI 29 84 kg/m²        Physical Exam     Physical Exam   Constitutional: He appears well-developed and well-nourished  HENT:   Head: Normocephalic and atraumatic  Eyes: Pupils are equal, round, and reactive to light  Conjunctivae are normal    Neck: Normal range of motion  No thyromegaly present  Cardiovascular: Normal rate, regular rhythm and normal heart sounds  No murmur heard  Pulmonary/Chest: Effort normal and breath sounds normal  No respiratory distress  Musculoskeletal:        Lumbar back: He exhibits decreased range of motion, tenderness and spasm  Positive straight leg raising sign on the left mostly for back pain rather than radiation down the leg  DTRs are 1 to 2/4 equally at knee and ankle  Lymphadenopathy:     He has no cervical adenopathy

## 2019-01-21 DIAGNOSIS — G89.4 CHRONIC PAIN SYNDROME: ICD-10-CM

## 2019-01-21 RX ORDER — HYDROCODONE BITARTRATE AND ACETAMINOPHEN 10; 325 MG/1; MG/1
1 TABLET ORAL EVERY 6 HOURS PRN
Qty: 120 TABLET | Refills: 0 | Status: SHIPPED | OUTPATIENT
Start: 2019-01-21 | End: 2019-02-27 | Stop reason: SDUPTHER

## 2019-01-21 NOTE — TELEPHONE ENCOUNTER
Approve        msg left with pt--that thsi last RX given--with new policy--pt is due for 6MM in 2/2019, pt aware needs Pain MD or get discuss tapering at appt in Feb

## 2019-02-27 DIAGNOSIS — G89.4 CHRONIC PAIN SYNDROME: ICD-10-CM

## 2019-02-28 RX ORDER — HYDROCODONE BITARTRATE AND ACETAMINOPHEN 10; 325 MG/1; MG/1
1 TABLET ORAL EVERY 6 HOURS PRN
Qty: 90 TABLET | Refills: 0 | Status: SHIPPED | OUTPATIENT
Start: 2019-02-28 | End: 2019-03-25 | Stop reason: SDUPTHER

## 2019-03-14 ENCOUNTER — TELEPHONE (OUTPATIENT)
Dept: FAMILY MEDICINE CLINIC | Facility: CLINIC | Age: 44
End: 2019-03-14

## 2019-03-14 NOTE — TELEPHONE ENCOUNTER
Call from [facility] effective 3/1/19 if on Narcotic greater than 5 days must have a Prior Auth    Hydrocodone / APAP

## 2019-03-25 DIAGNOSIS — G89.4 CHRONIC PAIN SYNDROME: ICD-10-CM

## 2019-03-25 RX ORDER — HYDROCODONE BITARTRATE AND ACETAMINOPHEN 10; 325 MG/1; MG/1
1 TABLET ORAL EVERY 8 HOURS PRN
Qty: 60 TABLET | Refills: 0 | Status: SHIPPED | OUTPATIENT
Start: 2019-03-25 | End: 2020-10-13

## 2019-04-24 DIAGNOSIS — R07.9 CHEST PAIN, UNSPECIFIED TYPE: ICD-10-CM

## 2019-04-24 RX ORDER — ASPIRIN 81 MG/1
TABLET ORAL
Qty: 90 TABLET | Refills: 3 | Status: SHIPPED | OUTPATIENT
Start: 2019-04-24 | End: 2021-06-22 | Stop reason: ALTCHOICE

## 2019-08-28 DIAGNOSIS — F33.41 RECURRENT MAJOR DEPRESSIVE DISORDER, IN PARTIAL REMISSION (HCC): ICD-10-CM

## 2019-08-28 RX ORDER — DULOXETIN HYDROCHLORIDE 60 MG/1
CAPSULE, DELAYED RELEASE ORAL
Qty: 30 CAPSULE | Refills: 11 | Status: SHIPPED | OUTPATIENT
Start: 2019-08-28 | End: 2020-08-26

## 2020-03-02 ENCOUNTER — OFFICE VISIT (OUTPATIENT)
Dept: URGENT CARE | Facility: CLINIC | Age: 45
End: 2020-03-02
Payer: COMMERCIAL

## 2020-03-02 VITALS
WEIGHT: 219 LBS | OXYGEN SATURATION: 95 % | HEIGHT: 72 IN | RESPIRATION RATE: 20 BRPM | DIASTOLIC BLOOD PRESSURE: 85 MMHG | SYSTOLIC BLOOD PRESSURE: 137 MMHG | TEMPERATURE: 100.4 F | HEART RATE: 100 BPM | BODY MASS INDEX: 29.66 KG/M2

## 2020-03-02 DIAGNOSIS — R68.89 FLU-LIKE SYMPTOMS: ICD-10-CM

## 2020-03-02 DIAGNOSIS — R06.2 WHEEZING: Primary | ICD-10-CM

## 2020-03-02 PROCEDURE — 99213 OFFICE O/P EST LOW 20 MIN: CPT | Performed by: PHYSICIAN ASSISTANT

## 2020-03-02 RX ORDER — IPRATROPIUM BROMIDE AND ALBUTEROL SULFATE 2.5; .5 MG/3ML; MG/3ML
3 SOLUTION RESPIRATORY (INHALATION) ONCE
Status: COMPLETED | OUTPATIENT
Start: 2020-03-02 | End: 2020-03-02

## 2020-03-02 RX ORDER — ALBUTEROL SULFATE 90 UG/1
2 AEROSOL, METERED RESPIRATORY (INHALATION) EVERY 4 HOURS PRN
Qty: 1 INHALER | Refills: 0 | Status: SHIPPED | OUTPATIENT
Start: 2020-03-02 | End: 2020-04-01

## 2020-03-02 RX ORDER — OSELTAMIVIR PHOSPHATE 75 MG/1
75 CAPSULE ORAL EVERY 12 HOURS SCHEDULED
Qty: 10 CAPSULE | Refills: 0 | Status: SHIPPED | OUTPATIENT
Start: 2020-03-02 | End: 2020-03-07

## 2020-03-02 RX ADMIN — IPRATROPIUM BROMIDE AND ALBUTEROL SULFATE 3 ML: 2.5; .5 SOLUTION RESPIRATORY (INHALATION) at 08:31

## 2020-03-02 NOTE — PROGRESS NOTES
[facility]        NAME: Pooja Alaniz is a 38 y o  male  : 1975    MRN: [id number]  DATE: 2020  TIME: 8:53 AM    Assessment and Plan   Wheezing [R06 2]  1  Wheezing  ipratropium-albuterol (DUO-NEB) 0 5-2 5 mg/3 mL inhalation solution 3 mL   2  Flu-like symptoms  oseltamivir (TAMIFLU) 75 mg capsule    albuterol (PROVENTIL HFA,VENTOLIN HFA) 90 mcg/act inhaler         Patient Instructions     Start Tamiflu as prescribed  Take Tylenol Motrin every 4 hours as needed for fever or pain  Drink plenty of fluids  If you start working harder to breathe have chest pain shortness of breath or generally worsened symptoms go to the emergency room for further evaluation  Your considered contagious into no longer run a fever for 24 hours  Follow up with PCP in 3-5 days  Proceed to  ER if symptoms worsen  Chief Complaint     Chief Complaint   Patient presents with    Cold Like Symptoms     c/o chest and nasal congestion, severe constant HA, nonproductive cough, body aches started last wk  History of Present Illness       Patient presents with 3 day history of cough headache body aches fevers as high as 101°  Patient states his worst symptom in the headache is not alleviated by a Motrin orTylenol and is keeping him awake at night  Patient did not have a flu vaccine this season  Review of Systems   Review of Systems   Constitutional: Positive for activity change, appetite change and fever  Negative for chills  HENT: Negative for congestion, ear pain, postnasal drip, rhinorrhea, sore throat and trouble swallowing  Respiratory: Positive for cough  Negative for chest tightness, shortness of breath and wheezing  Cardiovascular: Negative for chest pain  Gastrointestinal: Negative for diarrhea, nausea and vomiting  Musculoskeletal: Positive for myalgias  Skin: Negative for rash  Neurological: Positive for headaches  Hematological: Negative for adenopathy  Current Medications       Current Outpatient Medications:     aspirin (ECOTRIN LOW STRENGTH) 81 mg EC tablet, TAKE 1 TABLET (81 MG TOTAL) BY MOUTH DAILY, Disp: 90 tablet, Rfl: 3    DULoxetine (CYMBALTA) 60 mg delayed release capsule, 08/22/18  Hector Rodriguez TAKE 1 CAPSULE BY MOUTH EVERY DAY, Disp: 30 capsule, Rfl: 11    lansoprazole (PREVACID) 30 mg capsule, Take 1 capsule (30 mg total) by mouth daily, Disp: 90 capsule, Rfl: 3    albuterol (PROVENTIL HFA,VENTOLIN HFA) 90 mcg/act inhaler, Inhale 2 puffs every 4 (four) hours as needed for wheezing or shortness of breath, Disp: 1 Inhaler, Rfl: 0    atorvastatin (LIPITOR) 40 mg tablet, Take 1 tablet (40 mg total) by mouth daily (Patient not taking: Reported on 3/2/2020), Disp: 90 tablet, Rfl: 3    baclofen 10 mg tablet, Take 1 tablet (10 mg total) by mouth 3 (three) times a day for 5 days, Disp: 15 tablet, Rfl: 0    Cholecalciferol (D3-50) 61788 units capsule, Take 1 capsule by mouth once a week, Disp: , Rfl:     HYDROcodone-acetaminophen (NORCO)  mg per tablet, Take 1 tablet by mouth every 8 (eight) hours as needed (pain) Dr Jenny Hoffmann Super[facility]ising[facility] Daily Amount: 3 tablets (Patient not taking: Reported on 3/2/2020), Disp: 60 tablet, Rfl: 0    Methylprednisolone 4 MG TBPK, Use as directed on package (Patient not taking: Reported on 3/2/2020), Disp: 21 tablet, Rfl: 0    metoprolol succinate (TOPROL-XL) 25 mg 24 hr tablet, Take 1 tablet (25 mg total) by mouth daily (Patient not taking: Reported on 3/2/2020), Disp: 90 tablet, Rfl: 3    nitroglycerin (NITROSTAT) 0 4 mg SL tablet, Place 1 tablet (0 4 mg total) under the tongue every 5 (five) minutes as needed for chest pain (Patient not taking: Reported on 3/2/2020), Disp: 90 tablet, Rfl: 0    oseltamivir (TAMIFLU) 75 mg capsule, Take 1 capsule (75 mg total) by mouth every 12 (twelve) hours for 5 days, Disp: 10 capsule, Rfl: 0  No current facility-administered medications for this visit       Current Allergies Allergies as of 03/02/2020 - Reviewed 03/02/2020   Allergen Reaction Noted    Other  06/23/2014            The following portions of the patient's history were reviewed and updated as appropriate: allergies, current medications, past family history, past medical history, past social history, past surgical history and problem list      Past Medical History:   Diagnosis Date    Anxiety     Chest pain     Chronic arm pain     Chronic back pain     GERD (gastroesophageal reflux disease)     Hyperlipidemia     Vitamin D deficiency        Past Surgical History:   Procedure Laterality Date    NEUROPLASTY / TRANSPOSITION MEDIAN NERVE AT CARPAL TUNNEL      TENDON REPAIR      Extensor       Family History   Problem Relation Age of Onset    No Known Problems Mother     Diabetes Father         Mellitus    Diabetes Paternal Uncle          Medications have been verified  Objective   /85 (BP Location: Left arm, Patient Position: Sitting, Cuff Size: Large)   Pulse 100   Temp 100 4 °F (38 °C) (Tympanic)   Resp 20   Ht 6' (1 829 m)   Wt 99 3 kg (219 lb)   SpO2 95%   BMI 29 70 kg/m²          Physical Exam     Physical Exam   Constitutional: He is oriented to person, place, and time  He appears well-developed and well-nourished  HENT:   Head: Normocephalic and atraumatic  Right Ear: External ear normal    Left Ear: External ear normal    Nose: Nose normal    Mouth/Throat: Oropharynx is clear and moist    Eyes: Pupils are equal, round, and reactive to light  Conjunctivae and EOM are normal    Neck: Normal range of motion  Neck supple  Cardiovascular: Normal rate, regular rhythm and normal heart sounds  Pulmonary/Chest: Effort normal  He has wheezes  Lymphadenopathy:     He has no cervical adenopathy  Neurological: He is alert and oriented to person, place, and time  Skin: Skin is warm and dry  No rash noted  Psychiatric: He has a normal mood and affect   His behavior is normal  Nursing note and vitals reviewed  Mini neb  Performed by: Alex Baum PA-C  Authorized by: Alex Baum PA-C     Number of treatments:  1  Treatment 1:   Pre-Procedure     Symptoms:  Wheezing    Lung Sounds:  End expiratory wheezing all lung fields      HR:  98    RR:  20    SP02:  95    Medication Administered:  Duoneb - Albuterol 2 5 mg/Atrovent 0 5 mg  Post-Procedure     Lung sounds:  Clear to auscultation    HR:  104    RR:  18    SP02:  97

## 2020-03-02 NOTE — PATIENT INSTRUCTIONS
Start Tamiflu as prescribed  Take Tylenol Motrin every 4 hours as needed for fever or pain  Drink plenty of fluids  If you start working harder to breathe have chest pain shortness of breath or generally worsened symptoms go to the emergency room for further evaluation  Your considered contagious into no longer run a fever for 24 hours  Influenza   AMBULATORY CARE:   Influenza  (the flu) is an infection caused by the influenza virus  The flu is easily spread when an infected person coughs, sneezes, or has close contact with others  You may be able to spread the flu to others for 1 week or longer after signs or symptoms appear  Common signs and symptoms include the following:   · Fever and chills    · Headaches, body aches, and muscle or joint pain    · Cough, runny nose, and sore throat    · Loss of appetite, nausea, vomiting, or diarrhea    · Tiredness    · Trouble breathing  Call 911 for any of the following:   · You have trouble breathing, and your lips look purple or blue  · You have a seizure  Seek care immediately if:   · You are dizzy, or you are urinating less or not at all  · You have a headache with a stiff neck, and you feel tired or confused  · You have new pain or pressure in your chest     · Your symptoms, such as shortness of breath, vomiting, or diarrhea, get worse  · Your symptoms, such as fever and coughing, seem to get better, but then get worse  Contact your healthcare provider if:   · You have new muscle pain or weakness  · You have questions or concerns about your condition or care  Treatment for influenza  may include any of the following:  · Acetaminophen  decreases pain and fever  It is available without a doctor's order  Ask how much to take and how often to take it  Follow directions  Acetaminophen can cause liver damage if not taken correctly  · NSAIDs , such as ibuprofen, help decrease swelling, pain, and fever   This medicine is available with or without a doctor's order  NSAIDs can cause stomach bleeding or kidney problems in certain people  If you take blood thinner medicine, always ask your healthcare provider if NSAIDs are safe for you  Always read the medicine label and follow directions  · Antivirals  help fight a viral infection  Manage your symptoms:   · Rest  as much as you can to help you recover  · Drink liquids as directed  to help prevent dehydration  Ask how much liquid to drink each day and which liquids are best for you  Prevent the spread of the flu:   · Wash your hands often  Use soap and water  Wash your hands after you use the bathroom, change a child's diapers, or sneeze  Wash your hands before you prepare or eat food  Use gel hand cleanser when soap and water are not available  Do not touch your eyes, nose, or mouth unless you have washed your hands first            · Cover your mouth when you sneeze or cough  Cough into a tissue or the bend of your arm  · Clean shared items with a germ-killing   Clean table surfaces, doorknobs, and light switches  Do not share towels, silverware, and dishes with people who are sick  Wash bed sheets, towels, silverware, and dishes with soap and water  · Wear a mask  over your mouth and nose if you are sick or are near anyone who is sick  · Stay away from others  if you are sick  · Influenza vaccine  helps prevent influenza (flu)  Everyone older than 6 months should get a yearly influenza vaccine  Get the vaccine as soon as it is available, usually in September or October each year  Follow up with your healthcare provider as directed:  Write down your questions so you remember to ask them during your visits  © 2017 [employer] [employer] Information is for End User's use only and may not be sold, redistributed or otherwise used for commercial purposes   All illustrations and images included in CareNotes® are the copyrighted property of [employer]  or [employer]  The above information is an  only  It is not intended as medical advice for individual conditions or treatments  Talk to your doctor, nurse or pharmacist before following any medical regimen to see if it is safe and effective for you

## 2020-08-25 DIAGNOSIS — F33.41 RECURRENT MAJOR DEPRESSIVE DISORDER, IN PARTIAL REMISSION (HCC): ICD-10-CM

## 2020-08-26 RX ORDER — DULOXETIN HYDROCHLORIDE 60 MG/1
CAPSULE, DELAYED RELEASE ORAL
Qty: 30 CAPSULE | Refills: 0 | Status: SHIPPED | OUTPATIENT
Start: 2020-08-26 | End: 2020-10-01 | Stop reason: SDUPTHER

## 2020-09-01 ENCOUNTER — OFFICE VISIT (OUTPATIENT)
Dept: URGENT CARE | Facility: CLINIC | Age: 45
End: 2020-09-01
Payer: COMMERCIAL

## 2020-09-01 DIAGNOSIS — G44.201 ACUTE INTRACTABLE TENSION-TYPE HEADACHE: ICD-10-CM

## 2020-09-01 DIAGNOSIS — Z11.59 SPECIAL SCREENING EXAMINATION FOR UNSPECIFIED VIRAL DISEASE: Primary | ICD-10-CM

## 2020-09-01 DIAGNOSIS — R50.9 FEVER, UNSPECIFIED FEVER CAUSE: ICD-10-CM

## 2020-09-01 DIAGNOSIS — R50.81 FEVER IN OTHER DISEASES: ICD-10-CM

## 2020-09-01 PROBLEM — G44.209 ACUTE TENSION-TYPE HEADACHE: Status: ACTIVE | Noted: 2020-09-01

## 2020-09-01 PROBLEM — L03.113 CELLULITIS OF RIGHT UPPER EXTREMITY: Status: ACTIVE | Noted: 2020-09-01

## 2020-09-01 PROCEDURE — 99213 OFFICE O/P EST LOW 20 MIN: CPT

## 2020-09-01 PROCEDURE — U0003 INFECTIOUS AGENT DETECTION BY NUCLEIC ACID (DNA OR RNA); SEVERE ACUTE RESPIRATORY SYNDROME CORONAVIRUS 2 (SARS-COV-2) (CORONAVIRUS DISEASE [COVID-19]), AMPLIFIED PROBE TECHNIQUE, MAKING USE OF HIGH THROUGHPUT TECHNOLOGIES AS DESCRIBED BY CMS-2020-01-R: HCPCS

## 2020-09-01 RX ORDER — SULFAMETHOXAZOLE AND TRIMETHOPRIM 800; 160 MG/1; MG/1
1 TABLET ORAL DAILY
Qty: 7 TABLET | Refills: 0 | Status: SHIPPED | OUTPATIENT
Start: 2020-09-01 | End: 2020-09-08

## 2020-09-01 NOTE — PROGRESS NOTES
[facility]        NAME: Ana Cristina Godoy is a 38 y o  male  : 1975    MRN: [id number]  DATE: 2020  TIME: 11:46 AM    Assessment and Plan   Acute intractable tension-type headache [G44 201]  1  Acute intractable tension-type headache     2  Fever in other diseases     3  Special screening examination for unspecified viral disease           Patient Instructions       Follow up with PCP in 3-5 days  Proceed to  ER if symptoms worsen  Chief Complaint     Chief Complaint   Patient presents with    Cellulitis     pt had tattoo placed 1 week ago on R bicep, today c/o Headache that won't go away, possible skin infection, fever of 100 7, skin sore to touch, hot, and see puss under the skin; wife positive for covid approx 1 month ago         History of Present Illness       Patient is a 79-year-old male presenting today for her symptoms including a fever for the past 5 days  He reports getting a tattoo on his right arm 1 week ago and has recently noticed increased redness and discharge at the site of his tattoo along his right arm  Patient states in the past week he has also been running a fever with associated chills  Home temperatures have ranged from   He also reports persistent headaches for the past 3 days described as a tension type headache in the posterior aspect of his head  He states that he has a at a minimal cough which has been nonproductive  He denies any loss of taste  He denies any chest pain or shortness of breath  He denies any nausea or vomiting  Review of Systems   Review of Systems   Constitutional: Positive for chills, fatigue and fever  HENT: Positive for rhinorrhea  Eyes: Negative  Respiratory: Negative  Negative for cough and shortness of breath  Cardiovascular: Negative  Negative for chest pain  Gastrointestinal: Negative  Genitourinary: Negative  Skin: Negative  Allergic/Immunologic: Negative      Neurological: Positive for headaches  Hematological: Negative  Psychiatric/Behavioral: Negative            Current Medications       Current Outpatient Medications:     DULoxetine (CYMBALTA) 60 mg delayed release capsule, Take 1 capsule by mouth once daily, Disp: 30 capsule, Rfl: 0    aspirin (ECOTRIN LOW STRENGTH) 81 mg EC tablet, TAKE 1 TABLET (81 MG TOTAL) BY MOUTH DAILY (Patient not taking: Reported on 9/1/2020), Disp: 90 tablet, Rfl: 3    atorvastatin (LIPITOR) 40 mg tablet, Take 1 tablet (40 mg total) by mouth daily (Patient not taking: Reported on 3/2/2020), Disp: 90 tablet, Rfl: 3    baclofen 10 mg tablet, Take 1 tablet (10 mg total) by mouth 3 (three) times a day for 5 days, Disp: 15 tablet, Rfl: 0    Cholecalciferol (D3-50) 20730 units capsule, Take 1 capsule by mouth once a week, Disp: , Rfl:     HYDROcodone-acetaminophen (NORCO)  mg per tablet, Take 1 tablet by mouth every 8 (eight) hours as needed (pain) Dr Ana Jennings Super[facility] Daily Amount: 3 tablets (Patient not taking: Reported on 3/2/2020), Disp: 60 tablet, Rfl: 0    lansoprazole (PREVACID) 30 mg capsule, Take 1 capsule (30 mg total) by mouth daily (Patient not taking: Reported on 9/1/2020), Disp: 90 capsule, Rfl: 3    Methylprednisolone 4 MG TBPK, Use as directed on package (Patient not taking: Reported on 3/2/2020), Disp: 21 tablet, Rfl: 0    metoprolol succinate (TOPROL-XL) 25 mg 24 hr tablet, Take 1 tablet (25 mg total) by mouth daily (Patient not taking: Reported on 3/2/2020), Disp: 90 tablet, Rfl: 3    nitroglycerin (NITROSTAT) 0 4 mg SL tablet, Place 1 tablet (0 4 mg total) under the tongue every 5 (five) minutes as needed for chest pain (Patient not taking: Reported on 3/2/2020), Disp: 90 tablet, Rfl: 0    Current Allergies     Allergies as of 09/01/2020 - Reviewed 09/01/2020   Allergen Reaction Noted    Other  06/23/2014            The following portions of the patient's history were reviewed and updated as appropriate: allergies, current medications, past family history, past medical history, past social history, past surgical history and problem list      Past Medical History:   Diagnosis Date    Anxiety     Chest pain     Chronic arm pain     Chronic back pain     GERD (gastroesophageal reflux disease)     Hyperlipidemia     Vitamin D deficiency        Past Surgical History:   Procedure Laterality Date    NEUROPLASTY / TRANSPOSITION MEDIAN NERVE AT CARPAL TUNNEL      TENDON REPAIR      Extensor       Family History   Problem Relation Age of Onset    No Known Problems Mother     Diabetes Father         Mellitus    Diabetes Paternal Uncle          Medications have been verified  Objective   There were no vitals taken for this visit  Physical Exam     Physical Exam  Constitutional:       General: He is not in acute distress  Appearance: He is well-developed  He is not toxic-appearing  HENT:      Head: Normocephalic  Nose: No congestion or rhinorrhea  Eyes:      Pupils: Pupils are equal, round, and reactive to light  Neck:      Musculoskeletal: Normal range of motion  Cardiovascular:      Rate and Rhythm: Regular rhythm  Heart sounds: Normal heart sounds  Pulmonary:      Effort: Pulmonary effort is normal  No respiratory distress  Breath sounds: No wheezing  Musculoskeletal: Normal range of motion  Skin:     Findings: Erythema present  Comments: Macular papular rash along the right deltoid with erythema and pustule discharge   Neurological:      Mental Status: He is alert and oriented to person, place, and time

## 2020-09-03 ENCOUNTER — TELEPHONE (OUTPATIENT)
Dept: OTHER | Facility: OTHER | Age: 45
End: 2020-09-03

## 2020-09-03 LAB — SARS-COV-2 RNA SPEC QL NAA+PROBE: NOT DETECTED

## 2020-09-03 NOTE — TELEPHONE ENCOUNTER
The patient was called for notification of a test result for COVID-19  The patient did not answer the phone and a voicemail was left requesting a call back to [phone], Option 7

## 2020-09-09 NOTE — TELEPHONE ENCOUNTER
Approve      Pt tapering #60 this month    Pt aware needs appt before any refills Normal vision: sees adequately in most situations; can see medication labels, newsprint

## 2020-09-21 ENCOUNTER — HOSPITAL ENCOUNTER (EMERGENCY)
Facility: HOSPITAL | Age: 45
Discharge: HOME | End: 2020-09-21
Attending: EMERGENCY MEDICINE | Admitting: EMERGENCY MEDICINE
Payer: COMMERCIAL

## 2020-09-21 ENCOUNTER — APPOINTMENT (EMERGENCY)
Dept: RADIOLOGY | Facility: HOSPITAL | Age: 45
End: 2020-09-21
Payer: COMMERCIAL

## 2020-09-21 VITALS
SYSTOLIC BLOOD PRESSURE: 138 MMHG | HEART RATE: 80 BPM | WEIGHT: 222 LBS | TEMPERATURE: 98.6 F | RESPIRATION RATE: 16 BRPM | BODY MASS INDEX: 30.11 KG/M2 | DIASTOLIC BLOOD PRESSURE: 84 MMHG | OXYGEN SATURATION: 99 %

## 2020-09-21 DIAGNOSIS — S43.101A AC SEPARATION, TYPE 3, RIGHT, INITIAL ENCOUNTER: Primary | ICD-10-CM

## 2020-09-21 PROCEDURE — 73030 X-RAY EXAM OF SHOULDER: CPT

## 2020-09-21 PROCEDURE — 96372 THER/PROPH/DIAG INJ SC/IM: CPT

## 2020-09-21 PROCEDURE — 99284 EMERGENCY DEPT VISIT MOD MDM: CPT | Performed by: EMERGENCY MEDICINE

## 2020-09-21 PROCEDURE — 99283 EMERGENCY DEPT VISIT LOW MDM: CPT

## 2020-09-21 RX ORDER — MELOXICAM 15 MG/1
15 TABLET ORAL DAILY PRN
Qty: 20 TABLET | Refills: 0 | Status: SHIPPED | OUTPATIENT
Start: 2020-09-21 | End: 2020-10-15 | Stop reason: SDUPTHER

## 2020-09-21 RX ORDER — KETOROLAC TROMETHAMINE 30 MG/ML
30 INJECTION, SOLUTION INTRAMUSCULAR; INTRAVENOUS ONCE
Status: COMPLETED | OUTPATIENT
Start: 2020-09-21 | End: 2020-09-21

## 2020-09-21 RX ADMIN — KETOROLAC TROMETHAMINE 30 MG: 30 INJECTION, SOLUTION INTRAMUSCULAR at 02:17

## 2020-09-21 NOTE — ED NOTES
Ice pack applied to R shoulder and pt to [address] [facility] Kye MARIA RN  [phone]

## 2020-09-21 NOTE — ED PROVIDER NOTES
History  Chief Complaint   Patient presents with    Shoulder Injury     HPI  Hx chronic back pain/on Norco until ~ Mar 2019  Tonight, was getting some stew, but spilled it, then slipped in the stew  [address] right arm  Now with shoulder pain & deformity  Did hit head, no LOC, no hematoma/signs of trauma  No head or neck pain  Prior to Admission Medications   Prescriptions Last Dose Informant Patient Reported? Taking?    Cholecalciferol (D3-50) 03400 units capsule   Yes No   Sig: Take 1 capsule by mouth once a week   DULoxetine (CYMBALTA) 60 mg delayed release capsule   No Yes   Sig: Take 1 capsule by mouth once daily   HYDROcodone-acetaminophen (NORCO)  mg per tablet   No No   Sig: Take 1 tablet by mouth every 8 (eight) hours as needed (pain) Dr Francis Cesar [facility] Daily Amount: 3 tablets   Patient not taking: Reported on 3/2/2020   Methylprednisolone 4 MG TBPK   No No   Sig: Use as directed on package   Patient not taking: Reported on 3/2/2020   aspirin (ECOTRIN LOW STRENGTH) 81 mg EC tablet   No No   Sig: TAKE 1 TABLET (81 MG TOTAL) BY MOUTH DAILY   Patient not taking: Reported on 9/1/2020   atorvastatin (LIPITOR) 40 mg tablet   No No   Sig: Take 1 tablet (40 mg total) by mouth daily   Patient not taking: Reported on 3/2/2020   baclofen 10 mg tablet   No No   Sig: Take 1 tablet (10 mg total) by mouth 3 (three) times a day for 5 days   lansoprazole (PREVACID) 30 mg capsule   No No   Sig: Take 1 capsule (30 mg total) by mouth daily   Patient not taking: Reported on 9/1/2020   metoprolol succinate (TOPROL-XL) 25 mg 24 hr tablet   No No   Sig: Take 1 tablet (25 mg total) by mouth daily   Patient not taking: Reported on 3/2/2020   nitroglycerin (NITROSTAT) 0 4 mg SL tablet   No No   Sig: Place 1 tablet (0 4 mg total) under the tongue every 5 (five) minutes as needed for chest pain   Patient not taking: Reported on 3/2/2020      Facility-Administered Medications: None       Past Medical History: Diagnosis Date    Anxiety     Chest pain     Chronic arm pain     Chronic back pain     GERD (gastroesophageal reflux disease)     Hyperlipidemia     Vitamin D deficiency        Past Surgical History:   Procedure Laterality Date    NEUROPLASTY / TRANSPOSITION MEDIAN NERVE AT CARPAL TUNNEL      TENDON REPAIR      Extensor       Family History   Problem Relation Age of Onset    No Known Problems Mother     Diabetes Father         Mellitus    Diabetes Paternal Uncle      I have reviewed and agree with the history as documented  E-Cigarette/Vaping    E-Cigarette Use Never User      E-Cigarette/Vaping Substances     Social History     Tobacco Use    Smoking status: Current Every Day Smoker     Packs/day: 1 50     Years: 25 00     Pack years: 37 50     Types: Cigarettes    Smokeless tobacco: Never Used   Substance Use Topics    Alcohol use: Yes     Alcohol/week: 12 0 standard drinks     Types: 12 Cans of beer per week     Frequency: 4 or more times a week     Drinks per session: 3 or 4     Binge frequency: Less than monthly     Comment: 12 can in 3 days    Drug use: Not Currently     Types: Marijuana     Comment: medical card       Review of Systems   Constitutional: Negative for chills and fever  Respiratory: Negative for shortness of breath  Cardiovascular: Negative for chest pain  Gastrointestinal: Negative for abdominal pain  Psychiatric/Behavioral: Negative for agitation and behavioral problems  Physical Exam  Physical Exam  Vitals signs and nursing note reviewed  Constitutional:       Appearance: He is well-developed  HENT:      Head: Normocephalic and atraumatic  Neck:      Musculoskeletal: Normal range of motion and neck supple  Comments: Clinically clear C-spine  Cardiovascular:      Rate and Rhythm: Normal rate and regular rhythm  Pulmonary:      Effort: Pulmonary effort is normal    Abdominal:      General: There is no distension     Musculoskeletal: Comments: Swelling/point tenderness to superior/lateral right shoulder  Visible deformity  Minimal pain with passive INT/EXT rotation of shoulder  Psychiatric:         Behavior: Behavior normal          Vital Signs  ED Triage Vitals [09/21/20 0148]   Temperature Pulse Respirations Blood Pressure SpO2   98 6 °F (37 °C) 83 16 140/85 99 %      Temp src Heart Rate Source Patient Position - Orthostatic VS BP Location FiO2 (%)   -- -- -- -- --      Pain Score       9           Vitals:    09/21/20 0148   BP: 140/85   Pulse: 83         Visual Acuity      ED Medications  Medications   ketorolac (TORADOL) injection 30 mg (has no administration in time range)       Diagnostic Studies  Results Reviewed     None                 XR shoulder 2+ views RIGHT    (Results Pending)              Procedures  Procedures         ED Course                           SBIRT 20yo+      Most Recent Value   SBIRT (24 yo +)   In order to provide better care to our patients, we are screening all of our patients for alcohol and drug use  Would it be okay to ask you these screening questions? Yes Filed at: 09/21/2020 0154   Initial Alcohol Screen: US AUDIT-C    1  How often do you have a drink containing alcohol? 4 Filed at: 09/21/2020 0154   2  How many drinks containing alcohol do you have on a typical day you are drinking? 3 Filed at: 09/21/2020 0154   3a  Male UNDER 65: How often do you have five or more drinks on one occasion? 1 Filed at: 09/21/2020 0154   3b  FEMALE Any Age, or MALE 65+: How often do you have 4 or more drinks on one occassion? 0 Filed at: 09/21/2020 0154   Audit-C Score  (!) 8 Filed at: 09/21/2020 0154   Full Alcohol Screen: US AUDIT   4  How often during the last year have you found that you were not able to stop drinking once you had started? 0 Filed at: 09/21/2020 0154   5  How often during past year have you failed to do what was normally expected of you because of drinking? 0 Filed at: 09/21/2020 0154   6   How often in past year have you needed a first drink in the morning to get yourself going after a heavy drinking session? 0 Filed at: 09/21/2020 0154   7  How often in past year have you had feeling of guilt or remorse after drinking? 0 Filed at: 09/21/2020 0154   8  How often in past year have you been unable to remember what happened night before because you had been drinking? 0 Filed at: 09/21/2020 0154   9  Have you or someone else been injured as a result of your drinking? 0 Filed at: 09/21/2020 0154   10  Has a relative, friend, doctor or other health worker been concerned about your drinking and suggested you cut down?   0 Filed at: 09/21/2020 0154   AUDIT Total Score  8 Filed at: 09/21/2020 0154   ABBEY: How many times in the past year have you    Used an illegal drug or used a prescription medication for non-medical reasons? Never Filed at: 09/21/2020 0154                  MDM  Number of Diagnoses or Management Options  AC separation, type 3, right, initial encounter:   Diagnosis management comments: [place] CT rule neg  NEXUS neg  XR rt shoulder:  Shows significant AC separation  Sling, toradol, f/u with ortho         Amount and/or Complexity of Data Reviewed  Tests in the radiology section of CPT®: ordered and reviewed  Decide to obtain previous medical records or to obtain history from someone other than the patient: yes  Review and summarize past medical records: yes  Independent visualization of images, tracings, or specimens: yes        Disposition  Final diagnoses:   AC separation, type 3, right, initial encounter     Time reflects when diagnosis was documented in both MDM as applicable and the Disposition within this note     Time User Action Codes Description Comment    9/21/2020  2:05 AM Anu Evangelista Add [S43 101D] AC separation, type 2, right, subsequent encounter     9/21/2020  2:06 AM Anu Evangelista Remove [S43 101D] AC separation, type 2, right, subsequent encounter 9/21/2020  2:06 AM KD Evangelista [facility] Add [A26 123F] AC separation, type 3, right, initial encounter       ED Disposition     ED Disposition Condition Date/Time Comment    Discharge Stable Mon Sep 21, 2020  2:05 AM Brittany Mensah discharge to home/self care  Follow-up Information     Follow up With Specialties Details Why Contact Info Additional Information      [address] [facility] Specialists [facility] AFFILIATED WITH [facility] Orthopedic Surgery Schedule an appointment as soon as possible for a visit   [address] [id number]  [address] [place] [address] Specialists [facility] WITH [place] [id number]          Patient's Medications   Discharge Prescriptions    MELOXICAM (MOBIC) 15 MG TABLET    Take 1 tablet (15 mg total) by mouth daily as needed for moderate pain       Start Date: 9/21/2020 End Date: --       Order Dose: 15 mg       Quantity: 20 tablet    Refills: 0     No discharge procedures on file      PDMP Review     None          ED Provider  Electronically Signed by           Brigido Herrera MD  09/21/20 4556

## 2020-09-23 ENCOUNTER — OFFICE VISIT (OUTPATIENT)
Dept: OBGYN CLINIC | Facility: CLINIC | Age: 45
End: 2020-09-23
Payer: COMMERCIAL

## 2020-09-23 VITALS
HEART RATE: 87 BPM | DIASTOLIC BLOOD PRESSURE: 89 MMHG | SYSTOLIC BLOOD PRESSURE: 147 MMHG | WEIGHT: 217 LBS | BODY MASS INDEX: 29.39 KG/M2 | TEMPERATURE: 98.3 F | HEIGHT: 72 IN

## 2020-09-23 DIAGNOSIS — S43.51XA SPRAIN OF RIGHT ACROMIOCLAVICULAR LIGAMENT, INITIAL ENCOUNTER: Primary | ICD-10-CM

## 2020-09-23 PROCEDURE — 99203 OFFICE O/P NEW LOW 30 MIN: CPT | Performed by: ORTHOPAEDIC SURGERY

## 2020-09-23 NOTE — LETTER
September 23, 2020     Patient: [place]   YOB: 1975   Date of Visit: 9/23/2020       To Whom it May Concern:    Brigido Urias is under my professional care  He was seen in my office on 9/23/2020  He should not return towork until cleared by a physician  Patient has a sprain to his AC joint  Anticipated RTW is 4 months for full duty that includes heavy overhead lifting  If you have any questions or concerns, please don't hesitate to call           Sincerely,          Dorian Woodruff MD        CC: No Recipients

## 2020-09-23 NOTE — PROGRESS NOTES
Assessment:     1  Sprain of right acromioclavicular ligament, initial encounter          Plan:     Problem List Items Addressed This Visit        Musculoskeletal and Integument    Sprain of acromioclavicular ligament of right shoulder - Primary     41-year-old male with a right acromial clavicular sprain  I reviewed the radiographs and diagnosis with him  I think he should do very well with non operative management  We discussed what to expect in regards of length of recovery and functional status  He will follow up me in one month with x-rays of the right shoulder                Patient ID: Maria Isabel Coronado is a 38 y o  male  Chief Complaint:  Right shoulder injury    HPI:    41-year-old male who was at home on September 20th  He slipped on some stood that had spilled on the floor and landed on his right shoulder and head  There is immediate deformity noted  He was seen in the ER and given a sling  He is having numbness and tingling that comes and goes in the hand but he thinks this is likely from the sling  He is having pain and trouble sleeping  He is left-hand dominant  He works as a   No prior shoulder issues  Allergy:  Allergies   Allergen Reactions    Other      Annotation - [id number]:  Wood Mites       Medications:  all current active meds have been reviewed    Past Medical History:  Past Medical History:   Diagnosis Date    Anxiety     Chest pain     Chronic arm pain     Chronic back pain     GERD (gastroesophageal reflux disease)     Hyperlipidemia     Vitamin D deficiency        Past Surgical History:  Past Surgical History:   Procedure Laterality Date    NEUROPLASTY / TRANSPOSITION MEDIAN NERVE AT CARPAL TUNNEL      TENDON REPAIR      Extensor       Family History:  Family History   Problem Relation Age of Onset    No Known Problems Mother     Diabetes Father         Mellitus    Diabetes Paternal Uncle        Social History:  Social History Substance and Sexual Activity   Alcohol Use Yes    Alcohol/week: 12 0 standard drinks    Types: 12 Cans of beer per week    Frequency: 4 or more times a week    Drinks per session: 3 or 4    Binge frequency: Less than monthly    Comment: 12 can in 3 days     Social History     Substance and Sexual Activity   Drug Use Not Currently    Types: Marijuana    Comment: medical card     Social History     Tobacco Use   Smoking Status Current Every Day Smoker    Packs/day: 1 50    Years: 25 00    Pack years: 37 50    Types: Cigarettes   Smokeless Tobacco Never Used           ROS:  Review of Systems   Constitutional: Negative  HENT: Negative  Eyes: Negative  Respiratory: Negative  Cardiovascular: Negative  Gastrointestinal: Negative  Endocrine: Negative  Genitourinary: Negative  Musculoskeletal: Positive for arthralgias  Skin: Negative  Allergic/Immunologic: Negative  Neurological: Positive for numbness  Hematological: Negative  Psychiatric/Behavioral: Negative  Objective:  BP Readings from Last 1 Encounters:   09/23/20 147/89      Wt Readings from Last 1 Encounters:   09/23/20 98 4 kg (217 lb)        BMI:   Estimated body mass index is 29 43 kg/m² as calculated from the following:    Height as of this encounter: 6' (1 829 m)  Weight as of this encounter: 98 4 kg (217 lb)  EXAM:   Physical Exam  Constitutional:       General: He is not in acute distress  Appearance: He is well-developed  He is not diaphoretic  HENT:      Head: Normocephalic and atraumatic  Eyes:      General:         Right eye: No discharge  Left eye: No discharge  Neck:      Musculoskeletal: Normal range of motion and neck supple  Trachea: No tracheal deviation  Cardiovascular:      Rate and Rhythm: Normal rate and regular rhythm  Pulmonary:      Effort: Pulmonary effort is normal  No respiratory distress  Abdominal:      General: There is no distension  Palpations: Abdomen is soft  Tenderness: There is no abdominal tenderness  Skin:     General: Skin is warm  Findings: No erythema  Neurological:      Mental Status: He is alert and oriented to person, place, and time  Psychiatric:         Behavior: Behavior normal        Right Shoulder Exam     Tenderness   The patient is experiencing tenderness in the acromioclavicular joint (deformity noted at [facility] joint)  Left Shoulder Exam   Left shoulder exam is normal     Tenderness   The patient is experiencing no tenderness  Range of Motion   The patient has normal left shoulder ROM  Muscle Strength   The patient has normal left shoulder strength  Tests   Apprehension: negative  Stone test: negative  Cross arm: negative  Impingement: negative  Drop arm: negative    Other   Erythema: absent  Sensation: normal  Pulse: present               Radiographs:  I have personally reviewed pertinent films in PACS and my interpretation is AC separation with 100% displacement on right

## 2020-09-23 NOTE — ASSESSMENT & PLAN NOTE
79-year-old male with a right acromial clavicular sprain  I reviewed the radiographs and diagnosis with him  I think he should do very well with non operative management  We discussed what to expect in regards of length of recovery and functional status    He will follow up me in one month with x-rays of the right shoulder

## 2020-09-25 DIAGNOSIS — F33.41 RECURRENT MAJOR DEPRESSIVE DISORDER, IN PARTIAL REMISSION (HCC): ICD-10-CM

## 2020-10-01 ENCOUNTER — TELEPHONE (OUTPATIENT)
Dept: FAMILY MEDICINE CLINIC | Facility: CLINIC | Age: 45
End: 2020-10-01

## 2020-10-01 DIAGNOSIS — F33.41 RECURRENT MAJOR DEPRESSIVE DISORDER, IN PARTIAL REMISSION (HCC): ICD-10-CM

## 2020-10-02 RX ORDER — DULOXETIN HYDROCHLORIDE 60 MG/1
60 CAPSULE, DELAYED RELEASE ORAL DAILY
Qty: 30 CAPSULE | Refills: 0 | Status: SHIPPED | OUTPATIENT
Start: 2020-10-02 | End: 2020-10-13 | Stop reason: SDUPTHER

## 2020-10-05 RX ORDER — DULOXETIN HYDROCHLORIDE 60 MG/1
CAPSULE, DELAYED RELEASE ORAL
Qty: 30 CAPSULE | Refills: 0 | OUTPATIENT
Start: 2020-10-05

## 2020-10-13 ENCOUNTER — OFFICE VISIT (OUTPATIENT)
Dept: FAMILY MEDICINE CLINIC | Facility: CLINIC | Age: 45
End: 2020-10-13
Payer: COMMERCIAL

## 2020-10-13 VITALS
BODY MASS INDEX: 29.47 KG/M2 | OXYGEN SATURATION: 95 % | SYSTOLIC BLOOD PRESSURE: 130 MMHG | HEIGHT: 72 IN | DIASTOLIC BLOOD PRESSURE: 86 MMHG | WEIGHT: 217.6 LBS | TEMPERATURE: 98.4 F | HEART RATE: 104 BPM

## 2020-10-13 DIAGNOSIS — F17.200 SMOKING: ICD-10-CM

## 2020-10-13 DIAGNOSIS — F17.209 NICOTINE DEPENDENCE WITH NICOTINE-INDUCED DISORDER, UNSPECIFIED NICOTINE PRODUCT TYPE: ICD-10-CM

## 2020-10-13 DIAGNOSIS — E78.1 ESSENTIAL HYPERTRIGLYCERIDEMIA: ICD-10-CM

## 2020-10-13 DIAGNOSIS — E29.1 TESTICULAR HYPOGONADISM: ICD-10-CM

## 2020-10-13 DIAGNOSIS — Z76.89 ENCOUNTER TO ESTABLISH CARE WITH NEW DOCTOR: ICD-10-CM

## 2020-10-13 DIAGNOSIS — E55.9 VITAMIN D DEFICIENCY: ICD-10-CM

## 2020-10-13 DIAGNOSIS — E66.3 OVERWEIGHT WITH BODY MASS INDEX (BMI) OF 29 TO 29.9 IN ADULT: ICD-10-CM

## 2020-10-13 DIAGNOSIS — Z11.4 SCREENING FOR HIV (HUMAN IMMUNODEFICIENCY VIRUS): ICD-10-CM

## 2020-10-13 DIAGNOSIS — I10 HYPERTENSION, UNCONTROLLED: ICD-10-CM

## 2020-10-13 DIAGNOSIS — N52.9 ERECTILE DYSFUNCTION, UNSPECIFIED ERECTILE DYSFUNCTION TYPE: ICD-10-CM

## 2020-10-13 DIAGNOSIS — Z80.42 FAMILY HISTORY OF PROSTATE CANCER: ICD-10-CM

## 2020-10-13 DIAGNOSIS — K21.9 GASTROESOPHAGEAL REFLUX DISEASE WITHOUT ESOPHAGITIS: ICD-10-CM

## 2020-10-13 DIAGNOSIS — Z13.1 SCREENING FOR DIABETES MELLITUS: ICD-10-CM

## 2020-10-13 DIAGNOSIS — S43.51XS SPRAIN OF RIGHT ACROMIOCLAVICULAR LIGAMENT, SEQUELA: ICD-10-CM

## 2020-10-13 DIAGNOSIS — Z23 NEED FOR INFLUENZA VACCINATION: ICD-10-CM

## 2020-10-13 DIAGNOSIS — Z13.29 SCREENING FOR THYROID DISORDER: ICD-10-CM

## 2020-10-13 DIAGNOSIS — Z00.00 ANNUAL PHYSICAL EXAM: Primary | ICD-10-CM

## 2020-10-13 DIAGNOSIS — Z13.0 SCREENING FOR DEFICIENCY ANEMIA: ICD-10-CM

## 2020-10-13 DIAGNOSIS — F33.41 RECURRENT MAJOR DEPRESSIVE DISORDER, IN PARTIAL REMISSION (HCC): ICD-10-CM

## 2020-10-13 PROBLEM — L03.113 CELLULITIS OF RIGHT UPPER EXTREMITY: Status: RESOLVED | Noted: 2020-09-01 | Resolved: 2020-10-13

## 2020-10-13 PROBLEM — R06.02 SHORTNESS OF BREATH: Status: RESOLVED | Noted: 2018-04-18 | Resolved: 2020-10-13

## 2020-10-13 PROBLEM — I20.0 UNSTABLE ANGINA PECTORIS (HCC): Status: RESOLVED | Noted: 2018-03-19 | Resolved: 2020-10-13

## 2020-10-13 PROBLEM — R50.9 FEVER: Status: RESOLVED | Noted: 2020-09-01 | Resolved: 2020-10-13

## 2020-10-13 PROBLEM — M77.12 LATERAL EPICONDYLITIS OF LEFT ELBOW: Status: RESOLVED | Noted: 2018-03-19 | Resolved: 2020-10-13

## 2020-10-13 PROCEDURE — 99396 PREV VISIT EST AGE 40-64: CPT | Performed by: NURSE PRACTITIONER

## 2020-10-13 PROCEDURE — 90682 RIV4 VACC RECOMBINANT DNA IM: CPT | Performed by: NURSE PRACTITIONER

## 2020-10-13 PROCEDURE — 99406 BEHAV CHNG SMOKING 3-10 MIN: CPT | Performed by: NURSE PRACTITIONER

## 2020-10-13 PROCEDURE — 90471 IMMUNIZATION ADMIN: CPT | Performed by: NURSE PRACTITIONER

## 2020-10-13 PROCEDURE — 3725F SCREEN DEPRESSION PERFORMED: CPT | Performed by: NURSE PRACTITIONER

## 2020-10-13 RX ORDER — DULOXETIN HYDROCHLORIDE 60 MG/1
60 CAPSULE, DELAYED RELEASE ORAL DAILY
Qty: 90 CAPSULE | Refills: 1 | Status: SHIPPED | OUTPATIENT
Start: 2020-10-13 | End: 2020-10-29 | Stop reason: SDUPTHER

## 2020-10-13 RX ORDER — LANSOPRAZOLE 30 MG/1
30 CAPSULE, DELAYED RELEASE ORAL DAILY
Qty: 90 CAPSULE | Refills: 3 | Status: SHIPPED | OUTPATIENT
Start: 2020-10-13 | End: 2021-06-22 | Stop reason: SDUPTHER

## 2020-10-15 ENCOUNTER — TELEPHONE (OUTPATIENT)
Dept: OBGYN CLINIC | Facility: CLINIC | Age: 45
End: 2020-10-15

## 2020-10-15 DIAGNOSIS — S43.101A AC SEPARATION, TYPE 3, RIGHT, INITIAL ENCOUNTER: ICD-10-CM

## 2020-10-15 RX ORDER — MELOXICAM 15 MG/1
15 TABLET ORAL DAILY PRN
Qty: 40 TABLET | Refills: 0 | Status: SHIPPED | OUTPATIENT
Start: 2020-10-15 | End: 2021-06-22 | Stop reason: ALTCHOICE

## 2020-10-19 ENCOUNTER — CONSULT (OUTPATIENT)
Dept: PAIN MEDICINE | Facility: CLINIC | Age: 45
End: 2020-10-19
Payer: COMMERCIAL

## 2020-10-19 VITALS
BODY MASS INDEX: 29.74 KG/M2 | HEIGHT: 72 IN | TEMPERATURE: 99.2 F | DIASTOLIC BLOOD PRESSURE: 92 MMHG | SYSTOLIC BLOOD PRESSURE: 132 MMHG | WEIGHT: 219.6 LBS

## 2020-10-19 DIAGNOSIS — G89.29 CHRONIC BILATERAL LOW BACK PAIN, UNSPECIFIED WHETHER SCIATICA PRESENT: ICD-10-CM

## 2020-10-19 DIAGNOSIS — M54.16 LUMBAR RADICULOPATHY: ICD-10-CM

## 2020-10-19 DIAGNOSIS — M47.816 LUMBAR SPONDYLOSIS: ICD-10-CM

## 2020-10-19 DIAGNOSIS — M54.2 NECK PAIN: ICD-10-CM

## 2020-10-19 DIAGNOSIS — G62.9 PERIPHERAL POLYNEUROPATHY: ICD-10-CM

## 2020-10-19 DIAGNOSIS — M54.50 CHRONIC BILATERAL LOW BACK PAIN, UNSPECIFIED WHETHER SCIATICA PRESENT: ICD-10-CM

## 2020-10-19 DIAGNOSIS — M54.12 CERVICAL RADICULOPATHY: Primary | ICD-10-CM

## 2020-10-19 DIAGNOSIS — M54.9 MID BACK PAIN: ICD-10-CM

## 2020-10-19 PROCEDURE — 99244 OFF/OP CNSLTJ NEW/EST MOD 40: CPT | Performed by: ANESTHESIOLOGY

## 2020-10-19 RX ORDER — PREGABALIN 50 MG/1
50 CAPSULE ORAL 3 TIMES DAILY
Qty: 90 CAPSULE | Refills: 1 | Status: SHIPPED | OUTPATIENT
Start: 2020-10-19 | End: 2020-11-23 | Stop reason: SDUPTHER

## 2020-10-20 ENCOUNTER — OFFICE VISIT (OUTPATIENT)
Dept: OBGYN CLINIC | Facility: CLINIC | Age: 45
End: 2020-10-20
Payer: COMMERCIAL

## 2020-10-20 ENCOUNTER — APPOINTMENT (OUTPATIENT)
Dept: RADIOLOGY | Facility: CLINIC | Age: 45
End: 2020-10-20
Payer: COMMERCIAL

## 2020-10-20 ENCOUNTER — TELEPHONE (OUTPATIENT)
Dept: OBGYN CLINIC | Facility: CLINIC | Age: 45
End: 2020-10-20

## 2020-10-20 VITALS
WEIGHT: 216 LBS | DIASTOLIC BLOOD PRESSURE: 91 MMHG | SYSTOLIC BLOOD PRESSURE: 147 MMHG | HEIGHT: 72 IN | HEART RATE: 86 BPM | TEMPERATURE: 97.8 F | BODY MASS INDEX: 29.26 KG/M2

## 2020-10-20 DIAGNOSIS — G89.29 CHRONIC BILATERAL LOW BACK PAIN, UNSPECIFIED WHETHER SCIATICA PRESENT: ICD-10-CM

## 2020-10-20 DIAGNOSIS — S43.51XS SPRAIN OF RIGHT ACROMIOCLAVICULAR LIGAMENT, SEQUELA: Primary | ICD-10-CM

## 2020-10-20 DIAGNOSIS — M54.2 NECK PAIN: ICD-10-CM

## 2020-10-20 DIAGNOSIS — M47.816 LUMBAR SPONDYLOSIS: ICD-10-CM

## 2020-10-20 DIAGNOSIS — M54.12 CERVICAL RADICULOPATHY: ICD-10-CM

## 2020-10-20 DIAGNOSIS — M54.9 MID BACK PAIN: ICD-10-CM

## 2020-10-20 DIAGNOSIS — S43.51XS SPRAIN OF RIGHT ACROMIOCLAVICULAR LIGAMENT, SEQUELA: ICD-10-CM

## 2020-10-20 DIAGNOSIS — M54.50 CHRONIC BILATERAL LOW BACK PAIN, UNSPECIFIED WHETHER SCIATICA PRESENT: ICD-10-CM

## 2020-10-20 DIAGNOSIS — M54.16 LUMBAR RADICULOPATHY: ICD-10-CM

## 2020-10-20 PROCEDURE — 3077F SYST BP >= 140 MM HG: CPT | Performed by: ORTHOPAEDIC SURGERY

## 2020-10-20 PROCEDURE — 4004F PT TOBACCO SCREEN RCVD TLK: CPT | Performed by: ORTHOPAEDIC SURGERY

## 2020-10-20 PROCEDURE — 72072 X-RAY EXAM THORAC SPINE 3VWS: CPT

## 2020-10-20 PROCEDURE — 73030 X-RAY EXAM OF SHOULDER: CPT

## 2020-10-20 PROCEDURE — 99213 OFFICE O/P EST LOW 20 MIN: CPT | Performed by: ORTHOPAEDIC SURGERY

## 2020-10-20 PROCEDURE — 3080F DIAST BP >= 90 MM HG: CPT | Performed by: ORTHOPAEDIC SURGERY

## 2020-10-20 PROCEDURE — 72050 X-RAY EXAM NECK SPINE 4/5VWS: CPT

## 2020-10-20 PROCEDURE — 72110 X-RAY EXAM L-2 SPINE 4/>VWS: CPT

## 2020-10-27 ENCOUNTER — LAB (OUTPATIENT)
Dept: LAB | Facility: CLINIC | Age: 45
End: 2020-10-27
Payer: COMMERCIAL

## 2020-10-27 ENCOUNTER — TRANSCRIBE ORDERS (OUTPATIENT)
Dept: LAB | Facility: CLINIC | Age: 45
End: 2020-10-27

## 2020-10-27 DIAGNOSIS — Z11.4 SCREENING FOR HIV (HUMAN IMMUNODEFICIENCY VIRUS): ICD-10-CM

## 2020-10-27 DIAGNOSIS — N52.9 ERECTILE DYSFUNCTION, UNSPECIFIED ERECTILE DYSFUNCTION TYPE: ICD-10-CM

## 2020-10-27 DIAGNOSIS — Z13.29 SCREENING FOR THYROID DISORDER: ICD-10-CM

## 2020-10-27 DIAGNOSIS — Z13.1 SCREENING FOR DIABETES MELLITUS: ICD-10-CM

## 2020-10-27 DIAGNOSIS — Z13.0 SCREENING FOR DEFICIENCY ANEMIA: ICD-10-CM

## 2020-10-27 DIAGNOSIS — E55.9 VITAMIN D DEFICIENCY: ICD-10-CM

## 2020-10-27 DIAGNOSIS — Z80.42 FAMILY HISTORY OF PROSTATE CANCER: ICD-10-CM

## 2020-10-27 DIAGNOSIS — E78.1 ESSENTIAL HYPERTRIGLYCERIDEMIA: ICD-10-CM

## 2020-10-27 DIAGNOSIS — E29.1 TESTICULAR HYPOGONADISM: ICD-10-CM

## 2020-10-27 LAB
25(OH)D3 SERPL-MCNC: 21.2 NG/ML (ref 30–100)
ALBUMIN SERPL BCP-MCNC: 4 G/DL (ref 3.5–5)
ALP SERPL-CCNC: 81 U/L (ref 46–116)
ALT SERPL W P-5'-P-CCNC: 110 U/L (ref 12–78)
ANION GAP SERPL CALCULATED.3IONS-SCNC: 6 MMOL/L (ref 4–13)
AST SERPL W P-5'-P-CCNC: 43 U/L (ref 5–45)
BASOPHILS # BLD AUTO: 0.16 THOUSANDS/ΜL (ref 0–0.1)
BASOPHILS NFR BLD AUTO: 2 % (ref 0–1)
BILIRUB SERPL-MCNC: 0.47 MG/DL (ref 0.2–1)
BUN SERPL-MCNC: 16 MG/DL (ref 5–25)
CALCIUM SERPL-MCNC: 9.5 MG/DL (ref 8.3–10.1)
CHLORIDE SERPL-SCNC: 110 MMOL/L (ref 100–108)
CHOLEST SERPL-MCNC: 207 MG/DL (ref 50–200)
CO2 SERPL-SCNC: 25 MMOL/L (ref 21–32)
CREAT SERPL-MCNC: 0.83 MG/DL (ref 0.6–1.3)
EOSINOPHIL # BLD AUTO: 0.42 THOUSAND/ΜL (ref 0–0.61)
EOSINOPHIL NFR BLD AUTO: 4 % (ref 0–6)
ERYTHROCYTE [DISTWIDTH] IN BLOOD BY AUTOMATED COUNT: 14.1 % (ref 11.6–15.1)
GFR SERPL CREATININE-BSD FRML MDRD: 106 ML/MIN/1.73SQ M
GLUCOSE P FAST SERPL-MCNC: 118 MG/DL (ref 65–99)
HCT VFR BLD AUTO: 48.2 % (ref 36.5–49.3)
HDLC SERPL-MCNC: 44 MG/DL
HGB BLD-MCNC: 16.1 G/DL (ref 12–17)
IMM GRANULOCYTES # BLD AUTO: 0.04 THOUSAND/UL (ref 0–0.2)
IMM GRANULOCYTES NFR BLD AUTO: 0 % (ref 0–2)
LDLC SERPL CALC-MCNC: 86 MG/DL (ref 0–100)
LYMPHOCYTES # BLD AUTO: 3.37 THOUSANDS/ΜL (ref 0.6–4.47)
LYMPHOCYTES NFR BLD AUTO: 32 % (ref 14–44)
MCH RBC QN AUTO: 30.7 PG (ref 26.8–34.3)
MCHC RBC AUTO-ENTMCNC: 33.4 G/DL (ref 31.4–37.4)
MCV RBC AUTO: 92 FL (ref 82–98)
MONOCYTES # BLD AUTO: 1.33 THOUSAND/ΜL (ref 0.17–1.22)
MONOCYTES NFR BLD AUTO: 13 % (ref 4–12)
NEUTROPHILS # BLD AUTO: 5.07 THOUSANDS/ΜL (ref 1.85–7.62)
NEUTS SEG NFR BLD AUTO: 49 % (ref 43–75)
NRBC BLD AUTO-RTO: 0 /100 WBCS
PLATELET # BLD AUTO: 283 THOUSANDS/UL (ref 149–390)
PMV BLD AUTO: 9.9 FL (ref 8.9–12.7)
POTASSIUM SERPL-SCNC: 4.4 MMOL/L (ref 3.5–5.3)
PROT SERPL-MCNC: 7.8 G/DL (ref 6.4–8.2)
PSA SERPL-MCNC: 0.5 NG/ML (ref 0–4)
RBC # BLD AUTO: 5.24 MILLION/UL (ref 3.88–5.62)
SODIUM SERPL-SCNC: 141 MMOL/L (ref 136–145)
TRIGL SERPL-MCNC: 385 MG/DL
TSH SERPL DL<=0.05 MIU/L-ACNC: 3.47 UIU/ML (ref 0.36–3.74)
WBC # BLD AUTO: 10.39 THOUSAND/UL (ref 4.31–10.16)

## 2020-10-27 PROCEDURE — 84443 ASSAY THYROID STIM HORMONE: CPT

## 2020-10-27 PROCEDURE — 36415 COLL VENOUS BLD VENIPUNCTURE: CPT

## 2020-10-27 PROCEDURE — 80061 LIPID PANEL: CPT

## 2020-10-27 PROCEDURE — 80053 COMPREHEN METABOLIC PANEL: CPT

## 2020-10-27 PROCEDURE — 85025 COMPLETE CBC W/AUTO DIFF WBC: CPT

## 2020-10-27 PROCEDURE — 82306 VITAMIN D 25 HYDROXY: CPT

## 2020-10-27 PROCEDURE — 87389 HIV-1 AG W/HIV-1&-2 AB AG IA: CPT

## 2020-10-27 PROCEDURE — 84403 ASSAY OF TOTAL TESTOSTERONE: CPT

## 2020-10-27 PROCEDURE — 84402 ASSAY OF FREE TESTOSTERONE: CPT

## 2020-10-27 PROCEDURE — G0103 PSA SCREENING: HCPCS

## 2020-10-28 LAB
HIV 1+2 AB+HIV1 P24 AG SERPL QL IA: NORMAL
TESTOST FREE SERPL-MCNC: 21.5 PG/ML (ref 6.8–21.5)
TESTOST SERPL-MCNC: 225 NG/DL (ref 264–916)

## 2020-10-29 DIAGNOSIS — F33.41 RECURRENT MAJOR DEPRESSIVE DISORDER, IN PARTIAL REMISSION (HCC): ICD-10-CM

## 2020-10-29 RX ORDER — DULOXETIN HYDROCHLORIDE 60 MG/1
60 CAPSULE, DELAYED RELEASE ORAL DAILY
Qty: 90 CAPSULE | Refills: 1 | Status: SHIPPED | OUTPATIENT
Start: 2020-10-29 | End: 2021-05-12 | Stop reason: SDUPTHER

## 2020-11-03 ENCOUNTER — OFFICE VISIT (OUTPATIENT)
Dept: FAMILY MEDICINE CLINIC | Facility: CLINIC | Age: 45
End: 2020-11-03
Payer: COMMERCIAL

## 2020-11-03 VITALS
WEIGHT: 224 LBS | DIASTOLIC BLOOD PRESSURE: 78 MMHG | HEART RATE: 94 BPM | HEIGHT: 72 IN | OXYGEN SATURATION: 98 % | TEMPERATURE: 97.9 F | SYSTOLIC BLOOD PRESSURE: 140 MMHG | BODY MASS INDEX: 30.34 KG/M2

## 2020-11-03 DIAGNOSIS — Z23 ENCOUNTER FOR IMMUNIZATION: ICD-10-CM

## 2020-11-03 DIAGNOSIS — F33.41 RECURRENT MAJOR DEPRESSIVE DISORDER, IN PARTIAL REMISSION (HCC): Primary | ICD-10-CM

## 2020-11-03 DIAGNOSIS — Z13.31 POSITIVE DEPRESSION SCREENING: ICD-10-CM

## 2020-11-03 DIAGNOSIS — I10 ESSENTIAL HYPERTENSION: ICD-10-CM

## 2020-11-03 DIAGNOSIS — E78.1 ESSENTIAL HYPERTRIGLYCERIDEMIA: ICD-10-CM

## 2020-11-03 DIAGNOSIS — E66.09 CLASS 1 OBESITY DUE TO EXCESS CALORIES WITHOUT SERIOUS COMORBIDITY WITH BODY MASS INDEX (BMI) OF 30.0 TO 30.9 IN ADULT: ICD-10-CM

## 2020-11-03 DIAGNOSIS — F32.A ANXIETY AND DEPRESSION: ICD-10-CM

## 2020-11-03 DIAGNOSIS — E29.1 TESTICULAR HYPOGONADISM: ICD-10-CM

## 2020-11-03 DIAGNOSIS — E55.9 VITAMIN D DEFICIENCY: ICD-10-CM

## 2020-11-03 DIAGNOSIS — F41.9 ANXIETY AND DEPRESSION: ICD-10-CM

## 2020-11-03 PROBLEM — E66.811 CLASS 1 OBESITY DUE TO EXCESS CALORIES WITHOUT SERIOUS COMORBIDITY WITH BODY MASS INDEX (BMI) OF 30.0 TO 30.9 IN ADULT: Status: ACTIVE | Noted: 2020-10-13

## 2020-11-03 PROCEDURE — 90732 PPSV23 VACC 2 YRS+ SUBQ/IM: CPT

## 2020-11-03 PROCEDURE — 3725F SCREEN DEPRESSION PERFORMED: CPT | Performed by: NURSE PRACTITIONER

## 2020-11-03 PROCEDURE — 99213 OFFICE O/P EST LOW 20 MIN: CPT | Performed by: NURSE PRACTITIONER

## 2020-11-03 PROCEDURE — 90471 IMMUNIZATION ADMIN: CPT

## 2020-11-03 RX ORDER — FENOFIBRATE 54 MG/1
54 TABLET ORAL DAILY
Qty: 90 TABLET | Refills: 3 | Status: SHIPPED | OUTPATIENT
Start: 2020-11-03 | End: 2021-06-22 | Stop reason: SDUPTHER

## 2020-11-03 RX ORDER — ERGOCALCIFEROL 1.25 MG/1
50000 CAPSULE ORAL WEEKLY
Qty: 4 CAPSULE | Refills: 5 | Status: SHIPPED | OUTPATIENT
Start: 2020-11-03 | End: 2020-12-07 | Stop reason: SDUPTHER

## 2020-11-03 RX ORDER — OLMESARTAN MEDOXOMIL 5 MG/1
5 TABLET ORAL DAILY
Qty: 30 TABLET | Refills: 0 | Status: SHIPPED | OUTPATIENT
Start: 2020-11-03 | End: 2020-12-07 | Stop reason: SDUPTHER

## 2020-11-03 RX ORDER — CHOLECALCIFEROL (VITAMIN D3) 125 MCG
2000 CAPSULE ORAL DAILY
Qty: 90 TABLET | Refills: 3 | Status: SHIPPED | OUTPATIENT
Start: 2020-11-03 | End: 2021-02-13 | Stop reason: SDUPTHER

## 2020-11-23 ENCOUNTER — OFFICE VISIT (OUTPATIENT)
Dept: PAIN MEDICINE | Facility: CLINIC | Age: 45
End: 2020-11-23
Payer: COMMERCIAL

## 2020-11-23 VITALS
SYSTOLIC BLOOD PRESSURE: 143 MMHG | HEIGHT: 72 IN | DIASTOLIC BLOOD PRESSURE: 94 MMHG | WEIGHT: 223 LBS | BODY MASS INDEX: 30.2 KG/M2 | HEART RATE: 87 BPM

## 2020-11-23 DIAGNOSIS — G89.29 CHRONIC MIDLINE LOW BACK PAIN WITHOUT SCIATICA: ICD-10-CM

## 2020-11-23 DIAGNOSIS — M54.12 CERVICAL RADICULOPATHY: ICD-10-CM

## 2020-11-23 DIAGNOSIS — M54.2 NECK PAIN: ICD-10-CM

## 2020-11-23 DIAGNOSIS — M54.50 CHRONIC MIDLINE LOW BACK PAIN WITHOUT SCIATICA: ICD-10-CM

## 2020-11-23 DIAGNOSIS — M54.16 LUMBAR RADICULOPATHY: ICD-10-CM

## 2020-11-23 DIAGNOSIS — G89.4 CHRONIC PAIN SYNDROME: Primary | ICD-10-CM

## 2020-11-23 PROCEDURE — 3008F BODY MASS INDEX DOCD: CPT | Performed by: NURSE PRACTITIONER

## 2020-11-23 PROCEDURE — 99213 OFFICE O/P EST LOW 20 MIN: CPT | Performed by: NURSE PRACTITIONER

## 2020-11-23 PROCEDURE — 4004F PT TOBACCO SCREEN RCVD TLK: CPT | Performed by: NURSE PRACTITIONER

## 2020-11-23 PROCEDURE — 3080F DIAST BP >= 90 MM HG: CPT | Performed by: NURSE PRACTITIONER

## 2020-11-23 PROCEDURE — 3077F SYST BP >= 140 MM HG: CPT | Performed by: NURSE PRACTITIONER

## 2020-11-23 RX ORDER — PREGABALIN 75 MG/1
75 CAPSULE ORAL 3 TIMES DAILY
Qty: 90 CAPSULE | Refills: 2 | Status: SHIPPED | OUTPATIENT
Start: 2020-11-23 | End: 2021-02-03 | Stop reason: SDUPTHER

## 2020-11-30 ENCOUNTER — TELEPHONE (OUTPATIENT)
Dept: PSYCHIATRY | Facility: CLINIC | Age: 45
End: 2020-11-30

## 2020-12-01 ENCOUNTER — OFFICE VISIT (OUTPATIENT)
Dept: OBGYN CLINIC | Facility: CLINIC | Age: 45
End: 2020-12-01
Payer: COMMERCIAL

## 2020-12-01 VITALS
WEIGHT: 226 LBS | BODY MASS INDEX: 30.61 KG/M2 | DIASTOLIC BLOOD PRESSURE: 85 MMHG | HEIGHT: 72 IN | HEART RATE: 89 BPM | SYSTOLIC BLOOD PRESSURE: 129 MMHG

## 2020-12-01 DIAGNOSIS — S43.51XS SPRAIN OF RIGHT ACROMIOCLAVICULAR LIGAMENT, SEQUELA: Primary | ICD-10-CM

## 2020-12-01 PROCEDURE — 4004F PT TOBACCO SCREEN RCVD TLK: CPT | Performed by: ORTHOPAEDIC SURGERY

## 2020-12-01 PROCEDURE — 3079F DIAST BP 80-89 MM HG: CPT | Performed by: ORTHOPAEDIC SURGERY

## 2020-12-01 PROCEDURE — 3074F SYST BP LT 130 MM HG: CPT | Performed by: ORTHOPAEDIC SURGERY

## 2020-12-01 PROCEDURE — 3008F BODY MASS INDEX DOCD: CPT | Performed by: ORTHOPAEDIC SURGERY

## 2020-12-01 PROCEDURE — 99213 OFFICE O/P EST LOW 20 MIN: CPT | Performed by: ORTHOPAEDIC SURGERY

## 2020-12-04 ENCOUNTER — TELEPHONE (OUTPATIENT)
Dept: PSYCHIATRY | Facility: CLINIC | Age: 45
End: 2020-12-04

## 2020-12-07 DIAGNOSIS — E55.9 VITAMIN D DEFICIENCY: ICD-10-CM

## 2020-12-07 DIAGNOSIS — I10 ESSENTIAL HYPERTENSION: ICD-10-CM

## 2020-12-07 RX ORDER — ERGOCALCIFEROL 1.25 MG/1
50000 CAPSULE ORAL WEEKLY
Qty: 4 CAPSULE | Refills: 5 | Status: SHIPPED | OUTPATIENT
Start: 2020-12-07 | End: 2021-04-15 | Stop reason: SDUPTHER

## 2020-12-07 RX ORDER — OLMESARTAN MEDOXOMIL 5 MG/1
5 TABLET ORAL DAILY
Qty: 90 TABLET | Refills: 1 | Status: SHIPPED | OUTPATIENT
Start: 2020-12-07 | End: 2021-04-15 | Stop reason: SDUPTHER

## 2021-01-03 ENCOUNTER — HOSPITAL ENCOUNTER (EMERGENCY)
Facility: HOSPITAL | Age: 46
Discharge: HOME | End: 2021-01-03
Attending: EMERGENCY MEDICINE | Admitting: EMERGENCY MEDICINE
Payer: COMMERCIAL

## 2021-01-03 ENCOUNTER — APPOINTMENT (EMERGENCY)
Dept: RADIOLOGY | Facility: HOSPITAL | Age: 46
End: 2021-01-03
Payer: COMMERCIAL

## 2021-01-03 VITALS
SYSTOLIC BLOOD PRESSURE: 140 MMHG | HEIGHT: 72 IN | HEART RATE: 72 BPM | TEMPERATURE: 98.2 F | DIASTOLIC BLOOD PRESSURE: 84 MMHG | WEIGHT: 225 LBS | OXYGEN SATURATION: 96 % | RESPIRATION RATE: 16 BRPM | BODY MASS INDEX: 30.48 KG/M2

## 2021-01-03 DIAGNOSIS — M25.551 RIGHT HIP PAIN: Primary | ICD-10-CM

## 2021-01-03 PROCEDURE — 73502 X-RAY EXAM HIP UNI 2-3 VIEWS: CPT

## 2021-01-03 PROCEDURE — 96372 THER/PROPH/DIAG INJ SC/IM: CPT

## 2021-01-03 PROCEDURE — 99284 EMERGENCY DEPT VISIT MOD MDM: CPT | Performed by: EMERGENCY MEDICINE

## 2021-01-03 PROCEDURE — 99283 EMERGENCY DEPT VISIT LOW MDM: CPT

## 2021-01-03 RX ORDER — OXYCODONE HYDROCHLORIDE AND ACETAMINOPHEN 5; 325 MG/1; MG/1
1 TABLET ORAL ONCE
Status: COMPLETED | OUTPATIENT
Start: 2021-01-03 | End: 2021-01-03

## 2021-01-03 RX ORDER — KETOROLAC TROMETHAMINE 10 MG/1
10 TABLET, FILM COATED ORAL EVERY 6 HOURS PRN
Qty: 20 TABLET | Refills: 0 | Status: SHIPPED | OUTPATIENT
Start: 2021-01-03 | End: 2021-01-19

## 2021-01-03 RX ORDER — LIDOCAINE 50 MG/G
1 PATCH TOPICAL ONCE
Status: DISCONTINUED | OUTPATIENT
Start: 2021-01-03 | End: 2021-01-03 | Stop reason: HOSPADM

## 2021-01-03 RX ORDER — CYCLOBENZAPRINE HCL 10 MG
10 TABLET ORAL 2 TIMES DAILY PRN
Qty: 10 TABLET | Refills: 0 | Status: SHIPPED | OUTPATIENT
Start: 2021-01-03 | End: 2021-02-03 | Stop reason: SDUPTHER

## 2021-01-03 RX ORDER — KETOROLAC TROMETHAMINE 30 MG/ML
15 INJECTION, SOLUTION INTRAMUSCULAR; INTRAVENOUS ONCE
Status: COMPLETED | OUTPATIENT
Start: 2021-01-03 | End: 2021-01-03

## 2021-01-03 RX ADMIN — OXYCODONE HYDROCHLORIDE AND ACETAMINOPHEN 1 TABLET: 5; 325 TABLET ORAL at 09:12

## 2021-01-03 RX ADMIN — KETOROLAC TROMETHAMINE 15 MG: 30 INJECTION, SOLUTION INTRAMUSCULAR at 09:12

## 2021-01-03 RX ADMIN — LIDOCAINE 1 PATCH: 50 PATCH TOPICAL at 09:15

## 2021-01-03 NOTE — DISCHARGE INSTRUCTIONS
Your xrays were reassuring however you may have some early osteoarthritis  Take the toradol and flexeril for pain, however this may make you drowsy  Return if symptoms worsen or if new symptoms develop or if you have any other concerns

## 2021-01-03 NOTE — ED PROVIDER NOTES
History  Chief Complaint   Patient presents with    Hip Pain     right hip pain radiating down right leg for past 2 days "getting worse" per patient; denies injury or trauma; states he slept on the couch and awoke with this pain 2 mornings ago     Patient is a 50-year-old male with past medical history hyperlipidemia, presenting with gradual onset of right-sided hip pain started 2 days ago after he woke up from sleeping on the couch  Pain is worse with ambulation  No fevers, chills, swelling, IV DA, nausea, vomiting, back pain, weakness, numbness  Hip Pain  Location:  Right hip  Quality:  Dull  Severity:  Mild  Timing:  Constant  Chronicity:  New  Context:  After sleeping on couch  Relieved by:  Rest  Worsened by: Walking  Associated symptoms: no abdominal pain, no chest pain, no congestion, no cough, no diarrhea, no fever, no nausea, no rash, no rhinorrhea, no sore throat, no vomiting and no wheezing        Prior to Admission Medications   Prescriptions Last Dose Informant Patient Reported? Taking?    Cholecalciferol (Vitamin D3) 50 MCG (2000 UT) TABS   No No   Sig: Take 1 tablet (2,000 Units total) by mouth daily   DULoxetine (CYMBALTA) 60 mg delayed release capsule   No No   Sig: Take 1 capsule (60 mg total) by mouth daily   aspirin (ECOTRIN LOW STRENGTH) 81 mg EC tablet Not Taking at Unknown time  No No   Sig: TAKE 1 TABLET (81 MG TOTAL) BY MOUTH DAILY   Patient not taking: Reported on 9/1/2020   atorvastatin (LIPITOR) 40 mg tablet Not Taking at Unknown time  No No   Sig: Take 1 tablet (40 mg total) by mouth daily   Patient not taking: Reported on 3/2/2020   baclofen 10 mg tablet Not Taking at Unknown time  No No   Sig: Take 1 tablet (10 mg total) by mouth 3 (three) times a day for 5 days   Patient not taking: Reported on 10/19/2020   diclofenac sodium (VOLTAREN) 50 mg EC tablet Not Taking at Unknown time  No No   Sig: Take 1 tablet (50 mg total) by mouth 3 (three) times a day   Patient not taking: Reported on 1/3/2021   ergocalciferol (VITAMIN D2) 50,000 units   No No   Sig: Take 1 capsule (50,000 Units total) by mouth once a week   fenofibrate (TRICOR) 54 MG tablet   No No   Sig: Take 1 tablet (54 mg total) by mouth daily   lansoprazole (PREVACID) 30 mg capsule   No No   Sig: Take 1 capsule (30 mg total) by mouth daily   meloxicam (MOBIC) 15 mg tablet Not Taking at Unknown time  No No   Sig: Take 1 tablet (15 mg total) by mouth daily as needed for moderate pain   Patient not taking: Reported on 1/3/2021   olmesartan (BENICAR) 5 mg tablet   No No   Sig: Take 1 tablet (5 mg total) by mouth daily   pregabalin (LYRICA) 75 mg capsule   No No   Sig: Take 1 capsule (75 mg total) by mouth 3 (three) times a day      Facility-Administered Medications: None       Past Medical History:   Diagnosis Date    AC separation, right, sequela     Anxiety     Chest pain     Chronic arm pain     Chronic back pain     GERD (gastroesophageal reflux disease)     Hyperlipidemia     Vitamin D deficiency        Past Surgical History:   Procedure Laterality Date    NEUROPLASTY / TRANSPOSITION MEDIAN NERVE AT CARPAL TUNNEL      TENDON REPAIR      Extensor       Family History   Problem Relation Age of Onset    No Known Problems Mother     Diabetes Father         Mellitus    Diabetes Paternal Uncle      I have reviewed and agree with the history as documented  E-Cigarette/Vaping    E-Cigarette Use Never User      E-Cigarette/Vaping Substances    Nicotine No     THC No     CBD No     Flavoring No     Other No     Unknown No      Social History     Tobacco Use    Smoking status: Current Every Day Smoker     Packs/day: 1 50     Years: 25 00     Pack years: 37 50     Types: Cigarettes    Smokeless tobacco: Never Used   Substance Use Topics    Alcohol use:  Yes     Alcohol/week: 12 0 standard drinks     Types: 12 Cans of beer per week     Frequency: 4 or more times a week     Drinks per session: 3 or 4     Binge frequency: Less than monthly     Comment: 12 can in 3 days    Drug use: Not Currently       Review of Systems   Constitutional: Negative for chills and fever  HENT: Negative for congestion, nosebleeds, rhinorrhea and sore throat  Eyes: Negative for pain and visual disturbance  Respiratory: Negative for cough and wheezing  Cardiovascular: Negative for chest pain and leg swelling  Gastrointestinal: Negative for abdominal distention, abdominal pain, diarrhea, nausea and vomiting  Genitourinary: Negative for dysuria and frequency  Musculoskeletal: Positive for arthralgias  Negative for back pain and joint swelling  Skin: Negative for rash and wound  Neurological: Negative for weakness and numbness  Psychiatric/Behavioral: Negative for decreased concentration and suicidal ideas  Physical Exam  Physical Exam  Vitals signs and nursing note reviewed  Constitutional:       Appearance: He is well-developed  HENT:      Head: Normocephalic and atraumatic  Eyes:      Conjunctiva/sclera: Conjunctivae normal       Pupils: Pupils are equal, round, and reactive to light  Neck:      Musculoskeletal: Normal range of motion and neck supple  Trachea: No tracheal deviation  Cardiovascular:      Rate and Rhythm: Normal rate and regular rhythm  Heart sounds: Normal heart sounds  No murmur  Pulmonary:      Effort: Pulmonary effort is normal  No respiratory distress  Breath sounds: Normal breath sounds  No wheezing or rales  Abdominal:      General: Bowel sounds are normal  There is no distension  Palpations: Abdomen is soft  Tenderness: There is no abdominal tenderness  Musculoskeletal:         General: Tenderness present  No deformity  Skin:     General: Skin is warm and dry  Capillary Refill: Capillary refill takes less than 2 seconds  Neurological:      General: No focal deficit present  Mental Status: He is alert and oriented to person, place, and time  Sensory: No sensory deficit  Motor: No weakness  Gait: Gait normal    Psychiatric:         Judgment: Judgment normal          Vital Signs  ED Triage Vitals [01/03/21 0841]   Temperature Pulse Respirations Blood Pressure SpO2   98 2 °F (36 8 °C) 94 16 146/81 97 %      Temp Source Heart Rate Source Patient Position - Orthostatic VS BP Location FiO2 (%)   Temporal Monitor Sitting Left arm --      Pain Score       Worst Possible Pain           Vitals:    01/03/21 0841 01/03/21 1040   BP: 146/81 140/84   Pulse: 94 72   Patient Position - Orthostatic VS: Sitting Sitting         Visual Acuity      ED Medications  Medications   ketorolac (TORADOL) injection 15 mg (15 mg Intramuscular Given 1/3/21 0912)   oxyCODONE-acetaminophen (PERCOCET) 5-325 mg per tablet 1 tablet (1 tablet Oral Given 1/3/21 0912)       Diagnostic Studies  Results Reviewed     None                 XR hip/pelv 2-3 vws right if performed   Final Result by Rain Franco MD (01/03 1659)      No acute osseous abnormality              Workstation performed: [id number]                    Procedures  Procedures         ED Course                                           MDM  Number of Diagnoses or Management Options  Right hip pain: new and requires workup  Diagnosis management comments: Patient with right hip tenderness and stiffness  No trauma  Possible occult fracture vs OA  No evidence of septic joint, no erythema, no effusion, no swelling, no risk factors, no systemic signs of infection       Amount and/or Complexity of Data Reviewed  Review and summarize past medical records: yes  Independent visualization of images, tracings, or specimens: yes    Risk of Complications, Morbidity, and/or Mortality  Presenting problems: low  Diagnostic procedures: minimal  Management options: moderate        Disposition  Final diagnoses:   Right hip pain     Time reflects when diagnosis was documented in both MDM as applicable and the Disposition within this note     Time User Action Codes Description Comment    1/3/2021 10:08 AM Jorje Blood Add [M25 551] Right hip pain       ED Disposition     ED Disposition Condition Date/Time Comment    Discharge Stable Sun Avila 3, 2021 10:08 AM Fabiana Natalia discharge to home/self care              Follow-up Information     Follow up With Specialties Details [address], [address] Internal Medicine, Nurse Practitioner  As needed [address]   [phone]      Mohinder Machado MD Orthopedic Surgery Schedule an appointment as soon as possible for a visit   [address] [place]  [phone]            Discharge Medication List as of 1/3/2021 10:35 AM      START taking these medications    Details   cyclobenzaprine (FLEXERIL) 10 mg tablet Take 1 tablet (10 mg total) by mouth 2 (two) times a day as needed for muscle spasms, Starting Sun 1/3/2021, Normal      ketorolac (TORADOL) 10 mg tablet Take 1 tablet (10 mg total) by mouth every 6 (six) hours as needed for moderate pain for up to 5 days, Starting Sun 1/3/2021, Until Fri 1/8/2021, Normal         CONTINUE these medications which have NOT CHANGED    Details   aspirin (ECOTRIN LOW STRENGTH) 81 mg EC tablet TAKE 1 TABLET (81 MG TOTAL) BY MOUTH DAILY, Normal      atorvastatin (LIPITOR) 40 mg tablet Take 1 tablet (40 mg total) by mouth daily, Starting Mon 8/20/2018, Normal      baclofen 10 mg tablet Take 1 tablet (10 mg total) by mouth 3 (three) times a day for 5 days, Starting Wed 1/16/2019, Until Mon 1/21/2019, Normal      Cholecalciferol (Vitamin D3) 50 MCG (2000 UT) TABS Take 1 tablet (2,000 Units total) by mouth daily, Starting Tue 11/3/2020, Normal      diclofenac sodium (VOLTAREN) 50 mg EC tablet Take 1 tablet (50 mg total) by mouth 3 (three) times a day, Starting Mon 10/19/2020, Until Tue 12/1/2020, Normal      DULoxetine (CYMBALTA) 60 mg delayed release capsule Take 1 capsule (60 mg total) by mouth daily, Starting Thu 10/29/2020, Normal      ergocalciferol (VITAMIN D2) 50,000 units Take 1 capsule (50,000 Units total) by mouth once a week, Starting Mon 12/7/2020, Normal      fenofibrate (TRICOR) 54 MG tablet Take 1 tablet (54 mg total) by mouth daily, Starting Tue 11/3/2020, Normal      lansoprazole (PREVACID) 30 mg capsule Take 1 capsule (30 mg total) by mouth daily, Starting Tue 10/13/2020, Normal      meloxicam (MOBIC) 15 mg tablet Take 1 tablet (15 mg total) by mouth daily as needed for moderate pain, Starting Thu 10/15/2020, Normal      olmesartan (BENICAR) 5 mg tablet Take 1 tablet (5 mg total) by mouth daily, Starting Mon 12/7/2020, Normal      pregabalin (LYRICA) 75 mg capsule Take 1 capsule (75 mg total) by mouth 3 (three) times a day, Starting Mon 11/23/2020, Until Wed 12/23/2020, Normal           No discharge procedures on file      PDMP Review     None          ED Provider  Electronically Signed by           Teja Lynne DO  01/03/21 1160

## 2021-01-05 ENCOUNTER — APPOINTMENT (OUTPATIENT)
Dept: RADIOLOGY | Facility: CLINIC | Age: 46
End: 2021-01-05
Payer: COMMERCIAL

## 2021-01-05 ENCOUNTER — OFFICE VISIT (OUTPATIENT)
Dept: OBGYN CLINIC | Facility: CLINIC | Age: 46
End: 2021-01-05
Payer: COMMERCIAL

## 2021-01-05 VITALS
HEIGHT: 72 IN | BODY MASS INDEX: 30.48 KG/M2 | HEART RATE: 94 BPM | TEMPERATURE: 98.3 F | DIASTOLIC BLOOD PRESSURE: 81 MMHG | SYSTOLIC BLOOD PRESSURE: 128 MMHG | WEIGHT: 225 LBS

## 2021-01-05 DIAGNOSIS — R29.898 WEAKNESS OF RIGHT LOWER EXTREMITY: ICD-10-CM

## 2021-01-05 DIAGNOSIS — M54.50 ACUTE RIGHT-SIDED LOW BACK PAIN WITHOUT SCIATICA: ICD-10-CM

## 2021-01-05 DIAGNOSIS — M54.16 RADICULOPATHY, LUMBAR REGION: ICD-10-CM

## 2021-01-05 DIAGNOSIS — M54.16 RADICULOPATHY, LUMBAR REGION: Primary | ICD-10-CM

## 2021-01-05 PROCEDURE — 3074F SYST BP LT 130 MM HG: CPT | Performed by: FAMILY MEDICINE

## 2021-01-05 PROCEDURE — 99214 OFFICE O/P EST MOD 30 MIN: CPT | Performed by: FAMILY MEDICINE

## 2021-01-05 PROCEDURE — 72110 X-RAY EXAM L-2 SPINE 4/>VWS: CPT

## 2021-01-05 PROCEDURE — 3079F DIAST BP 80-89 MM HG: CPT | Performed by: FAMILY MEDICINE

## 2021-01-05 RX ORDER — PREDNISONE 10 MG/1
TABLET ORAL
Qty: 28 TABLET | Refills: 0 | Status: SHIPPED | OUTPATIENT
Start: 2021-01-05 | End: 2021-06-22 | Stop reason: ALTCHOICE

## 2021-01-05 NOTE — PATIENT INSTRUCTIONS
F/u after MRI  MRI Lumbar spine- ASAP  Lower back pain/ R lower extremity weakness  Hx of chronic lower back pain with new leg weakness/pain  Icing/heat as needed  Prednisone  Avoid ibupofen, mobic, aleve, advil while taking steroids  Tylenol as needed

## 2021-01-05 NOTE — PROGRESS NOTES
[facility] [place] [id number]  [facility] [id number] [phone]      Chief Complaint:  Chief Complaint   Patient presents with    Right Hip - Pain       Vitals:  /81 (BP Location: Right arm, Patient Position: Sitting, Cuff Size: Standard)   Pulse 94   Temp 98 3 °F (36 8 °C) (Temporal)   Ht 6' (1 829 m)   Wt 102 kg (225 lb)   BMI 30 52 kg/m²     The following portions of the patient's history were reviewed and updated as appropriate: allergies, current medications, past family history, past medical history, past social history, past surgical history, and problem list       Subjective:   Patient ID: Elier Brody is a 54 y o  male  Here c/o R hip pain  Hurts to walk/sit/stand/lay down  Constant pain  Pain started Friday am when he woke up  He had to be helped out of bed due to pain  Severe sharp pain  Denies any injury  Seen in ER 1/3/21- XR normal   Given flexeril- no help  He is having back pain- worse than normal   Hx of chronic pain- taking lyrica and mobic  Denies hx of CA or change in bowel/bladder  Pain radiating down the R leg  He is having tingling down the R leg  Review of Systems   Constitutional: Negative for fatigue and fever  Respiratory: Negative for shortness of breath  Cardiovascular: Negative for chest pain  Gastrointestinal: Positive for nausea  Negative for abdominal pain  Genitourinary: Negative for dysuria  Musculoskeletal: Positive for arthralgias, back pain, gait problem and myalgias  Skin: Negative for rash and wound  Neurological: Negative for weakness and headaches  Objective:  Back Exam     Tenderness   The patient is experiencing tenderness in the lumbar      Range of Motion   Extension: abnormal   Flexion: abnormal   Lateral bend right: abnormal   Lateral bend left: abnormal   Rotation right: abnormal   Rotation left: abnormal     Tests   Straight leg raise right: positive  Straight leg raise left: negative    Comments:  Pain with ROM of hip but no ant hip pain  Neurological Testing     Additional Neurological Details  R hip flexion- 2/5  Physical Exam  Vitals signs and nursing note reviewed  Constitutional:       Appearance: Normal appearance  He is well-developed  HENT:      Head: Normocephalic  Mouth/Throat:      Mouth: Mucous membranes are moist    Eyes:      Extraocular Movements: Extraocular movements intact  Neck:      Musculoskeletal: Normal range of motion  Cardiovascular:      Rate and Rhythm: Normal rate and regular rhythm  Heart sounds: Normal heart sounds  Pulmonary:      Effort: Pulmonary effort is normal       Breath sounds: Normal breath sounds  Abdominal:      General: Bowel sounds are normal       Palpations: Abdomen is soft  Musculoskeletal:         General: Tenderness present  Skin:     General: Skin is warm and dry  Neurological:      General: No focal deficit present  Mental Status: He is alert and oriented to person, place, and time  Motor: Weakness present  Deep Tendon Reflexes: Reflexes normal       Comments: R hip flexion- 2/5  Psychiatric:         Mood and Affect: Mood normal          Behavior: Behavior normal          Thought Content: Thought content normal          I have personally reviewed pertinent films in PACS and my interpretation is XR L hip- no fx, mild DJD  XR- L spine- no fx  Assessment/Plan:  Assessment/Plan   Diagnoses and all orders for this visit:    Radiculopathy, lumbar region  -     Cancel: XR spine lumbar minimum 4 views non injury; Future  -     Cancel: MRI lumbar spine wo contrast; Future  -     XR spine lumbar minimum 4 views non injury; Future  -     MRI lumbar spine wo contrast; Future  -     predniSONE 10 mg tablet; Take 7T PO x 1 day, then take 6T PO x 1 day, and continue to decrease  by 1T until finished   Quantity-28    Acute right-sided low back pain without sciatica  -     Cancel: XR spine lumbar minimum 4 views non injury; Future  -     Cancel: MRI lumbar spine wo contrast; Future  -     XR spine lumbar minimum 4 views non injury; Future  -     MRI lumbar spine wo contrast; Future  -     predniSONE 10 mg tablet; Take 7T PO x 1 day, then take 6T PO x 1 day, and continue to decrease  by 1T until finished  Quantity-28    Weakness of right lower extremity  -     Cancel: MRI lumbar spine wo contrast; Future  -     MRI lumbar spine wo contrast; Future  -     predniSONE 10 mg tablet; Take 7T PO x 1 day, then take 6T PO x 1 day, and continue to decrease  by 1T until finished  Quantity-28        Return for Recheck       Maeve Saeed MD

## 2021-01-13 ENCOUNTER — TELEPHONE (OUTPATIENT)
Dept: OBGYN CLINIC | Facility: CLINIC | Age: 46
End: 2021-01-13

## 2021-01-19 ENCOUNTER — HOSPITAL ENCOUNTER (OUTPATIENT)
Dept: MRI IMAGING | Facility: HOSPITAL | Age: 46
Discharge: HOME | End: 2021-01-19
Attending: FAMILY MEDICINE
Payer: COMMERCIAL

## 2021-01-19 ENCOUNTER — OFFICE VISIT (OUTPATIENT)
Dept: OBGYN CLINIC | Facility: CLINIC | Age: 46
End: 2021-01-19
Payer: COMMERCIAL

## 2021-01-19 VITALS — HEIGHT: 72 IN | BODY MASS INDEX: 30.48 KG/M2 | WEIGHT: 225 LBS

## 2021-01-19 DIAGNOSIS — S43.51XS SPRAIN OF RIGHT ACROMIOCLAVICULAR LIGAMENT, SEQUELA: Primary | ICD-10-CM

## 2021-01-19 DIAGNOSIS — R29.898 WEAKNESS OF RIGHT LOWER EXTREMITY: ICD-10-CM

## 2021-01-19 DIAGNOSIS — M54.16 RADICULOPATHY, LUMBAR REGION: ICD-10-CM

## 2021-01-19 DIAGNOSIS — M54.50 ACUTE RIGHT-SIDED LOW BACK PAIN WITHOUT SCIATICA: ICD-10-CM

## 2021-01-19 PROCEDURE — 72148 MRI LUMBAR SPINE W/O DYE: CPT

## 2021-01-19 PROCEDURE — 4004F PT TOBACCO SCREEN RCVD TLK: CPT | Performed by: ORTHOPAEDIC SURGERY

## 2021-01-19 PROCEDURE — 99213 OFFICE O/P EST LOW 20 MIN: CPT | Performed by: ORTHOPAEDIC SURGERY

## 2021-01-19 PROCEDURE — G1004 CDSM NDSC: HCPCS

## 2021-01-19 PROCEDURE — 3008F BODY MASS INDEX DOCD: CPT | Performed by: ORTHOPAEDIC SURGERY

## 2021-01-19 NOTE — PROGRESS NOTES
Assessment:     1  Sprain of right acromioclavicular ligament, sequela          Plan:     Problem List Items Addressed This Visit        Musculoskeletal and Integument    Sprain of acromioclavicular ligament of right shoulder - Primary           54 y o  male with a right acromioclavicular ligament sprain  Overall Sharon Salazar is doing well  He is making steady progress  It was discussed today that the clicking in the shoulder may be scar tissue and he may always have a click  Sharon Salazar will continue his current work restrictions, note was provided today  Follow up in 3 months time for re-evaluation  Patient ID: Chanel Manuel is a 54 y o  male  Chief Complaint:  Right shoulder pain     HPI:  54 y o  male presents to the office today for a follow up regarding a right AC joint injury, DOI 09/20/2020  Sharon Salazar has been working on shoulder stretching and ROM  Overall Sharon Salazar is doing well  He states that his symptoms have improved aprox  75 percent  He does note anterior shoulder pain with use, such as putting away groceries  He also notes a click to his right shoulder, which can be painful at times  He feels like his shoulder ROM is almost full at this time  Allergy:  Allergies   Allergen Reactions    Other      Annotation - [id number]:  Wood Mites       Medications:  all current active meds have been reviewed    Past Medical History:  Past Medical History:   Diagnosis Date    AC separation, right, sequela     Anxiety     Chest pain     Chronic arm pain     Chronic back pain     GERD (gastroesophageal reflux disease)     Hyperlipidemia     Vitamin D deficiency        Past Surgical History:  Past Surgical History:   Procedure Laterality Date    NEUROPLASTY / TRANSPOSITION MEDIAN NERVE AT CARPAL TUNNEL      TENDON REPAIR      Extensor       Family History:  Family History   Problem Relation Age of Onset    No Known Problems Mother     Diabetes Father         Mellitus    Diabetes Paternal Uncle Social History:  Social History     Substance and Sexual Activity   Alcohol Use Yes    Alcohol/week: 12 0 standard drinks    Types: 12 Cans of beer per week    Frequency: 4 or more times a week    Drinks per session: 3 or 4    Binge frequency: Less than monthly    Comment: 12 can in 3 days     Social History     Substance and Sexual Activity   Drug Use Not Currently     Social History     Tobacco Use   Smoking Status Current Every Day Smoker    Packs/day: 1 50    Years: 25 00    Pack years: 37 50    Types: Cigarettes   Smokeless Tobacco Never Used           ROS:  Review of Systems   Constitutional: Negative for chills, fever and unexpected weight change  HENT: Negative for hearing loss, nosebleeds and sore throat  Eyes: Negative for pain, redness and visual disturbance  Respiratory: Negative for cough, shortness of breath and wheezing  Cardiovascular: Negative for chest pain, palpitations and leg swelling  Gastrointestinal: Negative for abdominal pain, nausea and vomiting  Endocrine: Negative for polydipsia and polyuria  Genitourinary: Negative for difficulty urinating and hematuria  Musculoskeletal: Negative for arthralgias, joint swelling and myalgias  Skin: Negative for rash and wound  Neurological: Negative for dizziness, numbness and headaches  Psychiatric/Behavioral: Negative for decreased concentration, dysphoric mood and suicidal ideas  The patient is not nervous/anxious  Objective:  BP Readings from Last 1 Encounters:   01/05/21 128/81      Wt Readings from Last 1 Encounters:   01/19/21 102 kg (225 lb)        BMI:   Estimated body mass index is 30 52 kg/m² as calculated from the following:    Height as of this encounter: 6' (1 829 m)  Weight as of this encounter: 102 kg (225 lb)  EXAM:   Physical Exam  Vitals signs and nursing note reviewed  Constitutional:       Appearance: He is well-developed     Eyes:      Conjunctiva/sclera: Conjunctivae normal  Pupils: Pupils are equal, round, and reactive to light  Pulmonary:      Effort: Pulmonary effort is normal       Breath sounds: Normal breath sounds  Skin:     General: Skin is warm and dry  Neurological:      Mental Status: He is alert and oriented to person, place, and time  Psychiatric:         Behavior: Behavior normal        Right Shoulder Exam     Tenderness   The patient is experiencing tenderness in the acromioclavicular joint  Range of Motion   The patient has normal right shoulder ROM  Muscle Strength   The patient has normal right shoulder strength  Other   Erythema: absent  Scars: absent  Sensation: normal  Pulse: present    Comments:  - cross body abduction       Left Shoulder Exam     Range of Motion   The patient has normal left shoulder ROM              Radiographs:  No new imaging to review      Scribe Attestation    I,:  Tanisha Steen am acting as a scribe while in the presence of the attending physician :       I,:  Curry Agudelo MD personally performed the services described in this documentation    as scribed in my presence :

## 2021-01-19 NOTE — LETTER
January 19, 2021     Patient: Kortney Tinajero   YOB: 1975   Date of Visit: 1/19/2021       To Whom it May Concern:    Aureliano Porter is under my professional care  He was seen in my office on 1/19/2021  He will continue his current work restrictions  If you have any questions or concerns, please don't hesitate to call           Sincerely,          Silas Freed MD

## 2021-02-01 ENCOUNTER — TELEMEDICINE (OUTPATIENT)
Dept: PSYCHIATRY | Facility: CLINIC | Age: 46
End: 2021-02-01
Payer: COMMERCIAL

## 2021-02-01 DIAGNOSIS — F34.1 PERSISTENT DEPRESSIVE DISORDER: Primary | ICD-10-CM

## 2021-02-01 DIAGNOSIS — R45.4 DIFFICULTY CONTROLLING ANGER: ICD-10-CM

## 2021-02-01 PROCEDURE — 90792 PSYCH DIAG EVAL W/MED SRVCS: CPT | Performed by: PSYCHIATRY & NEUROLOGY

## 2021-02-01 RX ORDER — ARIPIPRAZOLE 5 MG/1
5 TABLET ORAL DAILY
Qty: 30 TABLET | Refills: 0 | Status: SHIPPED | OUTPATIENT
Start: 2021-02-01 | End: 2021-03-03

## 2021-02-01 RX ORDER — DULOXETIN HYDROCHLORIDE 30 MG/1
30 CAPSULE, DELAYED RELEASE ORAL DAILY
Qty: 30 CAPSULE | Refills: 1 | Status: SHIPPED | OUTPATIENT
Start: 2021-02-01 | End: 2021-04-02

## 2021-02-01 NOTE — BH TREATMENT PLAN
TREATMENT PLAN (Medication Management Only)        [facility]    Name and Date of Birth:  Odilon Carranza 54 y o  1975  Date of Treatment Plan: February 1, 2021  Diagnosis/Diagnoses:    1  Persistent depressive disorder    2  Difficulty controlling anger      Strengths/Personal Resources for Self-Care: supportive family  Area/Areas of need (in own words): depression, anger control  1  Long Term Goal: Feel happier  Target Date:1 month - 3/1/2021  Person/Persons responsible for completion of goal: Zurdo Montalvo  Short Term Objective (s) - How will we reach this goal?:   A  Provider new recommended medication/dosage changes and/or continue medication(s): continue current medications as prescribed  B  N/A   C  N/A  Target Date:1 month - 3/1/2021  Person/Persons Responsible for Completion of Goal: Himanshu Rowley  Progress Towards Goals: initiating treatment  Treatment Modality: medication management every 4 weeks  Review due 180 days from date of this plan: 4 months - 6/1/2021  Expected length of service: ongoing treatment  My Physician/PA/NP and I have developed this plan together and I agree to work on the goals and objectives  I understand the treatment goals that were developed for my treatment
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
## 2021-02-03 ENCOUNTER — OFFICE VISIT (OUTPATIENT)
Dept: PAIN MEDICINE | Facility: CLINIC | Age: 46
End: 2021-02-03
Payer: COMMERCIAL

## 2021-02-03 VITALS
HEIGHT: 72 IN | SYSTOLIC BLOOD PRESSURE: 141 MMHG | BODY MASS INDEX: 30.61 KG/M2 | HEART RATE: 102 BPM | WEIGHT: 226 LBS | DIASTOLIC BLOOD PRESSURE: 89 MMHG

## 2021-02-03 DIAGNOSIS — M54.41 CHRONIC MIDLINE LOW BACK PAIN WITH RIGHT-SIDED SCIATICA: ICD-10-CM

## 2021-02-03 DIAGNOSIS — G89.29 CHRONIC MIDLINE LOW BACK PAIN WITH RIGHT-SIDED SCIATICA: ICD-10-CM

## 2021-02-03 DIAGNOSIS — G89.4 CHRONIC PAIN SYNDROME: Primary | ICD-10-CM

## 2021-02-03 DIAGNOSIS — M54.16 LUMBAR RADICULOPATHY: ICD-10-CM

## 2021-02-03 DIAGNOSIS — M54.12 CERVICAL RADICULOPATHY: ICD-10-CM

## 2021-02-03 PROCEDURE — 99214 OFFICE O/P EST MOD 30 MIN: CPT | Performed by: NURSE PRACTITIONER

## 2021-02-03 RX ORDER — CYCLOBENZAPRINE HCL 10 MG
10 TABLET ORAL 3 TIMES DAILY PRN
Qty: 90 TABLET | Refills: 0 | Status: SHIPPED | OUTPATIENT
Start: 2021-02-03 | End: 2021-06-22 | Stop reason: ALTCHOICE

## 2021-02-03 RX ORDER — PREGABALIN 100 MG/1
100 CAPSULE ORAL 3 TIMES DAILY
Qty: 90 CAPSULE | Refills: 1 | Status: SHIPPED | OUTPATIENT
Start: 2021-02-03 | End: 2021-03-05

## 2021-02-03 NOTE — PATIENT INSTRUCTIONS
Increase Lyrica as follows: Today-75 mg this evening  Tomorrow-75 mg twice daily  Friday-75 mg 3 times daily  Take this dose for 1 week, then fill new prescription for 100 mg 3 times daily

## 2021-02-03 NOTE — PROGRESS NOTES
Assessment:  1  Chronic pain syndrome    2  Chronic midline low back pain with right-sided sciatica    3  Lumbar radiculopathy    4  Cervical radiculopathy        Plan:  While the patient was in the office today, I did have a thorough conversation regarding their chronic pain syndrome, medication management, and treatment plan options  Patient is a 71-year-old male with chronic pain syndrome related to chronic low back pain, lumbar radiculopathy  He was last seen here on 11/23/2020 at which time as recommended that he start physical therapy  Patient states that he never started physical therapy because he believes that it will not be helpful  Patient had an MRI of his lumbar spine revealing small disc protrusions/herniations at L4-5 and L5-S1 resulting in moderate canal stenosis at L4-5 and mild central canal stenosis at L5-S1  MRI results were reviewed with patient during today's visit  We discussed several options including starting physical therapy  Patient states that he is not interested in physical therapy because he believes it will not help  Discussed possibility of epidural steroid injections  Patient tells me that he has had epidural steroid injections in the past which were not helpful  He would like to be evaluated from a neurosurgical standpoint  As such, a referral was made for him to be evaluated by Dr Enrico Wallace  patient stopped Lyrica due to lack of efficacy  I explained to him that a medication like Lyrica needs to be slowly titrated to a therapeutic dose  As such, we will restart Lyrica titrating him to 100 mg 3 times daily  A prescription was sent electronically to his pharmacy  I provided him with a titration schedule  Renewed prescription for Flexeril 10 mg 3 times daily as needed for spasms  Prescription was sent electronically to his pharmacy  History of Present Illness:   The patient is a 54 y o  male who presents for a follow up office visit in regards to Neck Pain, Shoulder Pain, Leg Pain, Knee Pain, Hip Pain, and Back Pain  The patients current symptoms include   Complaints of low back pain that radiates down the right leg to his knee  He states that both legs feel weak at times  Current pain level is a 9/10  Pain is described as burning, sharp, throbbing, shooting  Current pain medications includes:  None    I have personally reviewed and/or updated the patient's past medical history, past surgical history, family history, social history, current medications, allergies, and vital signs today  Review of Systems  Review of Systems   Musculoskeletal: Positive for arthralgias, gait problem and myalgias  Decreased range of motion  Joint stiffness     All other systems reviewed and are negative  Past Medical History:   Diagnosis Date    AC separation, right, sequela     Anxiety     Chest pain     Chronic arm pain     Chronic back pain     GERD (gastroesophageal reflux disease)     Hyperlipidemia     Vitamin D deficiency        Past Surgical History:   Procedure Laterality Date    NEUROPLASTY / TRANSPOSITION MEDIAN NERVE AT CARPAL TUNNEL      TENDON REPAIR      Extensor       Family History   Problem Relation Age of Onset    No Known Problems Mother     Diabetes Father         Mellitus    Diabetes Paternal Uncle        Social History     Occupational History    Not on file   Tobacco Use    Smoking status: Current Every Day Smoker     Packs/day: 1 50     Years: 25 00     Pack years: 37 50     Types: Cigarettes    Smokeless tobacco: Never Used   Substance and Sexual Activity    Alcohol use:  Yes     Alcohol/week: 12 0 standard drinks     Types: 12 Cans of beer per week     Frequency: 4 or more times a week     Drinks per session: 3 or 4     Binge frequency: Less than monthly     Comment: 12 can in 3 days    Drug use: Not Currently    Sexual activity: Not on file         Current Outpatient Medications:     Cholecalciferol (Vitamin D3) 50 MCG (2000 UT) TABS, Take 1 tablet (2,000 Units total) by mouth daily, Disp: 90 tablet, Rfl: 3    DULoxetine (CYMBALTA) 60 mg delayed release capsule, Take 1 capsule (60 mg total) by mouth daily, Disp: 90 capsule, Rfl: 1    ergocalciferol (VITAMIN D2) 50,000 units, Take 1 capsule (50,000 Units total) by mouth once a week, Disp: 4 capsule, Rfl: 5    fenofibrate (TRICOR) 54 MG tablet, Take 1 tablet (54 mg total) by mouth daily, Disp: 90 tablet, Rfl: 3    lansoprazole (PREVACID) 30 mg capsule, Take 1 capsule (30 mg total) by mouth daily, Disp: 90 capsule, Rfl: 3    olmesartan (BENICAR) 5 mg tablet, Take 1 tablet (5 mg total) by mouth daily, Disp: 90 tablet, Rfl: 1    ARIPiprazole (ABILIFY) 5 mg tablet, Take 1 tablet (5 mg total) by mouth daily (Patient not taking: Reported on 2/3/2021), Disp: 30 tablet, Rfl: 0    aspirin (ECOTRIN LOW STRENGTH) 81 mg EC tablet, TAKE 1 TABLET (81 MG TOTAL) BY MOUTH DAILY (Patient not taking: Reported on 9/1/2020), Disp: 90 tablet, Rfl: 3    atorvastatin (LIPITOR) 40 mg tablet, Take 1 tablet (40 mg total) by mouth daily (Patient not taking: Reported on 3/2/2020), Disp: 90 tablet, Rfl: 3    cyclobenzaprine (FLEXERIL) 10 mg tablet, Take 1 tablet (10 mg total) by mouth 3 (three) times a day as needed for muscle spasms, Disp: 90 tablet, Rfl: 0    diclofenac sodium (VOLTAREN) 50 mg EC tablet, Take 1 tablet (50 mg total) by mouth 3 (three) times a day (Patient not taking: Reported on 1/3/2021), Disp: 90 tablet, Rfl: 1    DULoxetine (CYMBALTA) 30 mg delayed release capsule, Take 1 capsule (30 mg total) by mouth daily (Patient not taking: Reported on 2/3/2021), Disp: 30 capsule, Rfl: 1    ketorolac (TORADOL) 10 mg tablet, Take 1 tablet (10 mg total) by mouth every 6 (six) hours as needed for moderate pain for up to 5 days (Patient not taking: Reported on 2/1/2021), Disp: 20 tablet, Rfl: 0    meloxicam (MOBIC) 15 mg tablet, Take 1 tablet (15 mg total) by mouth daily as needed for moderate pain (Patient not taking: Reported on 1/3/2021), Disp: 40 tablet, Rfl: 0    predniSONE 10 mg tablet, Take 7T PO x 1 day, then take 6T PO x 1 day, and continue to decrease  by 1T until finished  Quantity-28 (Patient not taking: Reported on 1/19/2021), Disp: 28 tablet, Rfl: 0    pregabalin (LYRICA) 100 mg capsule, Take 1 capsule (100 mg total) by mouth 3 (three) times a day, Disp: 90 capsule, Rfl: 1    Allergies   Allergen Reactions    Other      Annotation - [id number]: Wood Mites       Physical Exam:    /89 (BP Location: Left arm, Patient Position: Sitting, Cuff Size: Large)   Pulse 102   Ht 6' (1 829 m)   Wt 103 kg (226 lb)   BMI 30 65 kg/m²     Constitutional:normal, well developed, well nourished, alert, in no distress and non-toxic and no overt pain behavior    Eyes:anicteric  HEENT:grossly intact  Neck:supple, symmetric, trachea midline and no masses   Pulmonary:even and unlabored  Cardiovascular:No edema or pitting edema present  Skin:Normal without rashes or lesions and well hydrated  Psychiatric:Mood and affect appropriate  Neurologic:Cranial Nerves II-XII grossly intact  Musculoskeletal:antalgic    Imaging  No orders to display       Orders Placed This Encounter   Procedures    Ambulatory referral to Neurosurgery

## 2021-02-09 ENCOUNTER — TELEPHONE (OUTPATIENT)
Dept: PSYCHIATRY | Facility: CLINIC | Age: 46
End: 2021-02-09

## 2021-02-09 NOTE — TELEPHONE ENCOUNTER
[employer] WILL NOT COVER ABILIFY   HE CALLED TO SEE IF YOU CAN PRESCRIBE AN ALTERNATIVE AND SEND TO HIS PHARMACY

## 2021-02-09 NOTE — TELEPHONE ENCOUNTER
Prior Authorization submitted to [facility] via [facility] for Aripiprazole 5 MG tablets  Decision pending  Will refer to Dr Marleta Duane for his information

## 2021-02-11 NOTE — TELEPHONE ENCOUNTER
Prior Authorization denial received from Clinical Services Team due to medication not approved for listed diagnosis  Called Clinical Service Team and provided them with verbal diagnosis due to diagnosis code not giving treating diagnosis  Representative requested that I submit fax with this information  Faxed request for reconsideration of prior authorization denial with diagnosis  Decision pending    Will refer to Dr Kam Vallejo for his information

## 2021-02-13 DIAGNOSIS — R07.9 CHEST PAIN, UNSPECIFIED TYPE: ICD-10-CM

## 2021-02-13 DIAGNOSIS — E55.9 VITAMIN D DEFICIENCY: ICD-10-CM

## 2021-02-13 RX ORDER — ASPIRIN 81 MG/1
81 TABLET ORAL DAILY
Qty: 90 TABLET | Refills: 0 | Status: CANCELLED | OUTPATIENT
Start: 2021-02-13

## 2021-02-15 RX ORDER — CHOLECALCIFEROL (VITAMIN D3) 125 MCG
2000 CAPSULE ORAL DAILY
Qty: 90 TABLET | Refills: 1 | Status: SHIPPED | OUTPATIENT
Start: 2021-02-15 | End: 2021-06-22 | Stop reason: SDUPTHER

## 2021-02-15 NOTE — TELEPHONE ENCOUNTER
Fax from [employer] team denying P A again due to no documentation of failure of at least one antidepressant  Submitted fax advising that Anette Sagastume has been on Cymbalta and Aripiprazole is being added to medication regimen  Decision pending  Will refer to Dr Isabel Barnard for his information

## 2021-02-15 NOTE — TELEPHONE ENCOUNTER
Fax from [address] [place] [address] Team in response to my request for review of P  A for Aripiprazole  They state it is a duplicate request   "Failure of at least one previous and one current antidepressant has not been documented"  Will refer to Dr Roberto Shook to see if he wants to prescribe something different

## 2021-02-17 ENCOUNTER — TELEPHONE (OUTPATIENT)
Dept: PSYCHIATRY | Facility: CLINIC | Age: 46
End: 2021-02-17

## 2021-02-17 NOTE — TELEPHONE ENCOUNTER
Pt called wanted to know what was the outcome for the abilify  Wanted to know what should he be taking   I also gave him the new office number

## 2021-02-22 ENCOUNTER — OFFICE VISIT (OUTPATIENT)
Dept: NEUROSURGERY | Facility: CLINIC | Age: 46
End: 2021-02-22
Payer: COMMERCIAL

## 2021-02-22 VITALS
DIASTOLIC BLOOD PRESSURE: 87 MMHG | SYSTOLIC BLOOD PRESSURE: 135 MMHG | WEIGHT: 226 LBS | TEMPERATURE: 98.4 F | BODY MASS INDEX: 30.61 KG/M2 | HEART RATE: 96 BPM | HEIGHT: 72 IN | RESPIRATION RATE: 16 BRPM

## 2021-02-22 DIAGNOSIS — M54.41 CHRONIC MIDLINE LOW BACK PAIN WITH RIGHT-SIDED SCIATICA: ICD-10-CM

## 2021-02-22 DIAGNOSIS — G89.29 CHRONIC MIDLINE LOW BACK PAIN WITH RIGHT-SIDED SCIATICA: ICD-10-CM

## 2021-02-22 DIAGNOSIS — G89.4 CHRONIC PAIN SYNDROME: Primary | ICD-10-CM

## 2021-02-22 DIAGNOSIS — M54.16 LUMBAR RADICULOPATHY: ICD-10-CM

## 2021-02-22 PROCEDURE — 99203 OFFICE O/P NEW LOW 30 MIN: CPT | Performed by: NURSE PRACTITIONER

## 2021-02-22 PROCEDURE — 4004F PT TOBACCO SCREEN RCVD TLK: CPT | Performed by: NURSE PRACTITIONER

## 2021-02-22 PROCEDURE — 3008F BODY MASS INDEX DOCD: CPT | Performed by: NURSE PRACTITIONER

## 2021-02-22 NOTE — PROGRESS NOTES
Assessment/Plan:    Chronic pain syndrome  · As addressed in HPI  · As addressed in lumbar radiculopathy    Plan  · As addressed in lumbar radiculopathy      Lumbar radiculopathy  · As addressed in HPI  · Bilateral low back and lower extremity pain   · Dermatoidal distribution L5 L5 S1    · Hs not completed conservative treatment measures  Physical therapy and or steroid injections  · Denies bowel or bladder dysfunction, denies saddle anesthesia,   · No reg flags  Imagining:  MRI Lumbar spine IMPRESSION:   · Small central protrusion type disc herniations are seen at L4-5 and L5-S1 resulting in moderate central stenosis at L4-5 and mild central stenosis L5-S1  Facet hypertrophy contributes to central stenosis   Transitional lumbosacral junction  ·  Imagining reviewed in collaboration with Dr Maryjane Bernard --may benefit from lumbar decompression            PLAN:  · Continue care with pain management  · Participate in conservative treatments injections and physical therapy  · Continue medicinal treatment lyrical and cyclobenzaprine  · F/U neurosurgery appointment with Dr Maryjane Bernard in 90 days          Lumbar radiculopathy  -     Ambulatory referral to Neurosurgery    Chronic midline low back pain with right-sided sciatica  -     Ambulatory referral to Neurosurgery        Subjective: Referral for pain management for low back and lower extremity pain  Patient ID: Bernardo Burgos is a 54 y o  male     HPI   He has a longstanding history of chronic pain affecting his low back and bilateral lower extremities for greater than 10 years   Has progressively worsend over the course of the past 4 years  Denies inciting factors other than life long employment performing heavy lifting, moving objets , twisting, and bending, cut concrete using claudia hammer, 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      Pain location bilateral low back just above both buttocks, with distribution into bilateral buttocks, anterior and posterior thighs on left,  posterior lateral aspect bilateral to knees occasionally to left ankle  Left leg anterior more than posterior pain  Right posterior and anteroir pain bad  Pain characterization, numbness and needle sensations low back and lower buttocks, severe numbness  In thighs causing sensation as though wthey want to snap  , feel tight like a rubber band  Tightness in low back  Intermittent burning pain in lower extremities as well as numbness and tingling  Severity 10/10     Aggravating factors long drive to work , sitting for long period has problem standing to get out of car, has back stiffness, legs aching and hurt  Has 95 mile drive to and from work, Sitting makes leg pain worse   Standing and walking for long periods has aching in low back thighs to legs stop at knee, occasionally into  ankles        Temporal relieving factors - Taking it easy, take breaks during distance driving      Multi modal treatments-  Physical therapy greater than 6 years ago --improved pain in  left thigh and leg no longer goes into ankle  2/1/21 pain management ordered referral to physical therapy has not scheduled appointment, not sure will help , several ordes in the past with no follow-thru  Medicinal Lyrica not efficacious , recent dose increase  Flexeril   For low back tightness  Take once in a great while pain has not been bad enough to take , avoid use secondary to sedation effect  Diclofenac and Meloxicam not taking      Social--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  Bending twisting and lifting  At least 200 lbs  95 mile ride t work , stop half way for stand and stretch break  I have spent 60 minutes with patient today in which greater than 50% of this time was spent in assessment, examination, impressions, reviewing imagining and recommendations for care   All questions were answered to his satisfaction, and contact information provided in the event additional questions arise  Patient acknowledged an understanding and agreement with plan  REVIEW OF SYSTEMS  Review of Systems   Musculoskeletal: Positive for back pain (radiating to B/L buttocks and legs to knees, occasionally left leg to ankle), gait problem (2/2 shooting pain in B/L legs), neck pain (and across shoulders, burning pain) and neck stiffness  Allergic/Immunologic: Positive for environmental allergies  Neurological: Positive for weakness (B/L legs) and numbness (N/T B/L legs)  Psychiatric/Behavioral: Positive for sleep disturbance  All other systems reviewed and are negative          Meds/Allergies     Current Outpatient Medications   Medication Sig Dispense Refill    atorvastatin (LIPITOR) 40 mg tablet Take 1 tablet (40 mg total) by mouth daily 90 tablet 3    Cholecalciferol (Vitamin D3) 50 MCG (2000 UT) TABS Take 1 tablet (2,000 Units total) by mouth daily 90 tablet 1    DULoxetine (CYMBALTA) 30 mg delayed release capsule Take 1 capsule (30 mg total) by mouth daily 30 capsule 1    DULoxetine (CYMBALTA) 60 mg delayed release capsule Take 1 capsule (60 mg total) by mouth daily 90 capsule 1    ergocalciferol (VITAMIN D2) 50,000 units Take 1 capsule (50,000 Units total) by mouth once a week 4 capsule 5    fenofibrate (TRICOR) 54 MG tablet Take 1 tablet (54 mg total) by mouth daily 90 tablet 3    lansoprazole (PREVACID) 30 mg capsule Take 1 capsule (30 mg total) by mouth daily 90 capsule 3    pregabalin (LYRICA) 100 mg capsule Take 1 capsule (100 mg total) by mouth 3 (three) times a day 90 capsule 1    ARIPiprazole (ABILIFY) 5 mg tablet Take 1 tablet (5 mg total) by mouth daily (Patient not taking: Reported on 2/3/2021) 30 tablet 0    aspirin (ECOTRIN LOW STRENGTH) 81 mg EC tablet TAKE 1 TABLET (81 MG TOTAL) BY MOUTH DAILY (Patient not taking: Reported on 9/1/2020) 90 tablet 3    cyclobenzaprine (FLEXERIL) 10 mg tablet Take 1 tablet (10 mg total) by mouth 3 (three) times a day as needed for muscle spasms (Patient not taking: Reported on 2/22/2021) 90 tablet 0    diclofenac sodium (VOLTAREN) 50 mg EC tablet Take 1 tablet (50 mg total) by mouth 3 (three) times a day (Patient not taking: Reported on 1/3/2021) 90 tablet 1    ketorolac (TORADOL) 10 mg tablet Take 1 tablet (10 mg total) by mouth every 6 (six) hours as needed for moderate pain for up to 5 days (Patient not taking: Reported on 2/1/2021) 20 tablet 0    meloxicam (MOBIC) 15 mg tablet Take 1 tablet (15 mg total) by mouth daily as needed for moderate pain (Patient not taking: Reported on 1/3/2021) 40 tablet 0    olmesartan (BENICAR) 5 mg tablet Take 1 tablet (5 mg total) by mouth daily (Patient not taking: Reported on 2/22/2021) 90 tablet 1    predniSONE 10 mg tablet Take 7T PO x 1 day, then take 6T PO x 1 day, and continue to decrease  by 1T until finished  Quantity-28 (Patient not taking: Reported on 1/19/2021) 28 tablet 0     No current facility-administered medications for this visit  Allergies   Allergen Reactions    Other      Annotation - [id number]: Wood Mites       PAST HISTORY    Past Medical History:   Diagnosis Date    AC separation, right, sequela     Anxiety     Chest pain     Chronic arm pain     Chronic back pain     GERD (gastroesophageal reflux disease)     Hyperlipidemia     Vitamin D deficiency        Past Surgical History:   Procedure Laterality Date    NEUROPLASTY / TRANSPOSITION MEDIAN NERVE AT CARPAL TUNNEL      TENDON REPAIR      Extensor       Social History     Tobacco Use    Smoking status: Current Every Day Smoker     Packs/day: 1 50     Years: 25 00     Pack years: 37 50     Types: Cigarettes    Smokeless tobacco: Never Used   Substance Use Topics    Alcohol use:  Yes     Alcohol/week: 12 0 standard drinks     Types: 12 Cans of beer per week     Frequency: 4 or more times a week     Drinks per session: 3 or 4     Binge frequency: Less than monthly     Comment: 12 can in 3 days  Drug use: Not Currently       Family History   Problem Relation Age of Onset    No Known Problems Mother     Diabetes Father         Mellitus    Diabetes Paternal Uncle        The following portions of the patient's history were reviewed and updated as appropriate: allergies, current medications, past family history, past medical history, past social history, past surgical history and problem list       EXAM    Vitals:Blood pressure 135/87, pulse 96, temperature 98 4 °F (36 9 °C), temperature source Tympanic, resp  rate 16, height 6' (1 829 m), weight 103 kg (226 lb)  ,Body mass index is 30 65 kg/m²  Physical Exam  Vitals signs and nursing note reviewed  Constitutional:       Appearance: Normal appearance  Comments: In no acute distress    Eyes:      General: No scleral icterus  Right eye: No discharge  Left eye: No discharge  Extraocular Movements: Extraocular movements intact  Conjunctiva/sclera: Conjunctivae normal    Neck:      Musculoskeletal: Normal range of motion  Pulmonary:      Effort: Pulmonary effort is normal  No respiratory distress  Musculoskeletal:      Right lower leg: No edema  Left lower leg: No edema  Skin:     General: Skin is warm and dry  Findings: No rash  Neurological:      General: No focal deficit present  Mental Status: He is alert and oriented to person, place, and time  Sensory: Sensory deficit:   Coordination: Abnormal coordination:    Psychiatric:         Mood and Affect: Mood normal          Behavior: Behavior normal          Neurologic Exam     Mental Status   Oriented to person, place, and time     Level of consciousness: alert    Motor Exam     Strength   Right iliopsoas: 5/5  Left iliopsoas: 5/5  Right quadriceps: 5/5  Left quadriceps: 5/5  Right hamstrin/5  Left hamstrin/5  Right anterior tibial: 5/5  Left anterior tibial: 5/5  Right gastroc: 5/5  Left gastroc: 5/5    Gait, Coordination, and Reflexes     Gait  Gait: (antalgic)      Imaging Studies      1/19/2021   MRI LUMBAR SPINE WITHOUT CONTRAST     INDICATION: M54 16: Radiculopathy, lumbar region  M54 5: Low back pain  R29 898: Other symptoms and signs involving the musculoskeletal system  Lower back pain  Right lower extremity weakness      COMPARISON:  None      TECHNIQUE:  Sagittal T1, sagittal T2, sagittal inversion recovery, axial T1 and axial T2, coronal T2     IMAGE QUALITY:  Diagnostic     FINDINGS:     VERTEBRAL BODIES:  There is a transitional lumbosacral junction  5 lumbar vertebral segments noted with a transitional S1 segment with bilateral lumbarized  There is a hypoplastic disc at S1-S2  Complete disc space is seen at L5-S1  Number and is   capped consistent with the prior plain film  Normal alignment of the lumbar spine  No spondylolysis or spondylolisthesis  No scoliosis  No compression fracture  Normal marrow signal is identified within the visualized bony structures  No discrete   marrow lesion      SACRUM:  Normal signal within the sacrum  No evidence of insufficiency or stress fracture      DISTAL CORD AND CONUS:  T1 hyperintensity along the filum terminale beginning at L3 and extending to L4-5 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 which finding of questionable clinical significance      PARASPINAL SOFT TISSUES:  Paraspinal soft tissues are unremarkable      LOWER THORACIC DISC SPACES:  Normal disc height and signal   No disc herniation, canal stenosis or foraminal narrowing      LUMBAR DISC SPACES:     L1-L2:  Normal      L2-L3:  Normal      L3-L4:  Mild facet hypertrophy  Minimal central stenosis without foraminal narrowing      L4-L5:  Moderate facet hypertrophy identified with diffuse annular bulge and a small central broad-based protrusion type disc herniation  There is moderate central stenosis  No significant foraminal narrowing     L5-S1:  Moderate facet hypertrophy    Mild annular bulge with small central evonne based protrusion results in mild central stenosis  Foramina are patent      IMPRESSION:     Small central protrusion type disc herniations are seen at L4-5 and L5-S1 resulting in moderate central stenosis at L4-5 and mild central stenosis L5-S1  Facet hypertrophy contributes to central stenosis      Transitional lumbosacral junction         Workstation performed: [id number]     I have personally reviewed pertinent reports     and I have personally reviewed pertinent films in PACS

## 2021-02-22 NOTE — TELEPHONE ENCOUNTER
LM on Zurdo's VM with direct nursing number and requested that he call me back to discuss medication

## 2021-02-22 NOTE — TELEPHONE ENCOUNTER
Sam Carrillo returned my call  Discussed him staying at current dose of Cymbalta 90 MG or possibility of increasing to max dose of 120 starting next week  Advised him that Dr Lynnell Koyanagi would discuss other options with him at next appointment  Sam Carrillo will call back if he decides he want to increase his Cymbalta

## 2021-02-22 NOTE — PATIENT INSTRUCTIONS
If condition worsens  weakness in legs, urine retention, stool incontinence , instability in walking --call neurosurgery immediately o for appointment rescheduling  F/U with neurosurgery in 90 days appointment with Dr Burgess Person to disuss effect of physical therapy and injections       Continue medication regimen as prescribed by pain management

## 2021-02-28 NOTE — ASSESSMENT & PLAN NOTE
· As addressed in HPI  · Bilateral low back and lower extremity pain   · Dermatoidal distribution L5 L5 S1    · Hs not completed conservative treatment measures  Physical therapy and or steroid injections  · Denies bowel or bladder dysfunction, denies saddle anesthesia,   · No reg flags  Imagining:  MRI Lumbar spine IMPRESSION:   · Small central protrusion type disc herniations are seen at L4-5 and L5-S1 resulting in moderate central stenosis at L4-5 and mild central stenosis L5-S1  Facet hypertrophy contributes to central stenosis   Transitional lumbosacral junction  ·  Imagining reviewed in collaboration with Dr Maryjane Bernard --may benefit from lumbar decompression            PLAN:  · Continue care with pain management  · Participate in conservative treatments injections and physical therapy  · Continue medicinal treatment lyrical and cyclobenzaprine     · F/U neurosurgery appointment with Dr Maryjane Bernard in 80 days

## 2021-02-28 NOTE — ASSESSMENT & PLAN NOTE
· As addressed in HPI  · As addressed in lumbar radiculopathy    Plan  · As addressed in lumbar radiculopathy

## 2021-03-10 DIAGNOSIS — Z23 ENCOUNTER FOR IMMUNIZATION: ICD-10-CM

## 2021-03-24 DIAGNOSIS — I10 ESSENTIAL HYPERTENSION: ICD-10-CM

## 2021-04-07 RX ORDER — OLMESARTAN MEDOXOMIL 5 MG/1
5 TABLET ORAL DAILY
Qty: 90 TABLET | Refills: 0 | OUTPATIENT
Start: 2021-04-07

## 2021-04-15 DIAGNOSIS — I10 ESSENTIAL HYPERTENSION: ICD-10-CM

## 2021-04-15 DIAGNOSIS — E55.9 VITAMIN D DEFICIENCY: ICD-10-CM

## 2021-04-15 RX ORDER — OLMESARTAN MEDOXOMIL 5 MG/1
5 TABLET ORAL DAILY
Qty: 90 TABLET | Refills: 1 | Status: SHIPPED | OUTPATIENT
Start: 2021-04-15 | End: 2021-06-22 | Stop reason: SDUPTHER

## 2021-04-15 RX ORDER — ERGOCALCIFEROL 1.25 MG/1
50000 CAPSULE ORAL WEEKLY
Qty: 8 CAPSULE | Refills: 0 | Status: SHIPPED | OUTPATIENT
Start: 2021-04-15 | End: 2021-06-22 | Stop reason: ALTCHOICE

## 2021-05-12 DIAGNOSIS — F33.41 RECURRENT MAJOR DEPRESSIVE DISORDER, IN PARTIAL REMISSION (HCC): ICD-10-CM

## 2021-05-12 RX ORDER — DULOXETIN HYDROCHLORIDE 60 MG/1
60 CAPSULE, DELAYED RELEASE ORAL DAILY
Qty: 90 CAPSULE | Refills: 1 | Status: SHIPPED | OUTPATIENT
Start: 2021-05-12 | End: 2021-08-02 | Stop reason: SDUPTHER

## 2021-06-22 ENCOUNTER — OFFICE VISIT (OUTPATIENT)
Dept: FAMILY MEDICINE CLINIC | Facility: CLINIC | Age: 46
End: 2021-06-22
Payer: COMMERCIAL

## 2021-06-22 VITALS
DIASTOLIC BLOOD PRESSURE: 82 MMHG | HEIGHT: 72 IN | WEIGHT: 227 LBS | OXYGEN SATURATION: 98 % | TEMPERATURE: 99 F | HEART RATE: 89 BPM | BODY MASS INDEX: 30.75 KG/M2 | SYSTOLIC BLOOD PRESSURE: 142 MMHG

## 2021-06-22 DIAGNOSIS — Z13.29 SCREENING FOR THYROID DISORDER: ICD-10-CM

## 2021-06-22 DIAGNOSIS — E78.1 ESSENTIAL HYPERTRIGLYCERIDEMIA: ICD-10-CM

## 2021-06-22 DIAGNOSIS — I10 ESSENTIAL HYPERTENSION: Primary | ICD-10-CM

## 2021-06-22 DIAGNOSIS — K21.9 GASTROESOPHAGEAL REFLUX DISEASE WITHOUT ESOPHAGITIS: ICD-10-CM

## 2021-06-22 DIAGNOSIS — E66.09 CLASS 1 OBESITY DUE TO EXCESS CALORIES WITHOUT SERIOUS COMORBIDITY WITH BODY MASS INDEX (BMI) OF 30.0 TO 30.9 IN ADULT: ICD-10-CM

## 2021-06-22 DIAGNOSIS — Z13.1 SCREENING FOR DIABETES MELLITUS: ICD-10-CM

## 2021-06-22 DIAGNOSIS — Z13.0 SCREENING FOR DEFICIENCY ANEMIA: ICD-10-CM

## 2021-06-22 DIAGNOSIS — E55.9 VITAMIN D DEFICIENCY: ICD-10-CM

## 2021-06-22 DIAGNOSIS — G47.33 OSA (OBSTRUCTIVE SLEEP APNEA): ICD-10-CM

## 2021-06-22 DIAGNOSIS — R53.83 FATIGUE, UNSPECIFIED TYPE: ICD-10-CM

## 2021-06-22 DIAGNOSIS — G47.19 EXCESSIVE DAYTIME SLEEPINESS: ICD-10-CM

## 2021-06-22 PROCEDURE — 3077F SYST BP >= 140 MM HG: CPT | Performed by: NURSE PRACTITIONER

## 2021-06-22 PROCEDURE — 99214 OFFICE O/P EST MOD 30 MIN: CPT | Performed by: NURSE PRACTITIONER

## 2021-06-22 PROCEDURE — 4004F PT TOBACCO SCREEN RCVD TLK: CPT | Performed by: NURSE PRACTITIONER

## 2021-06-22 PROCEDURE — 3008F BODY MASS INDEX DOCD: CPT | Performed by: NURSE PRACTITIONER

## 2021-06-22 PROCEDURE — 3079F DIAST BP 80-89 MM HG: CPT | Performed by: NURSE PRACTITIONER

## 2021-06-22 RX ORDER — LANSOPRAZOLE 30 MG/1
30 CAPSULE, DELAYED RELEASE ORAL DAILY
Qty: 90 CAPSULE | Refills: 3 | Status: SHIPPED | OUTPATIENT
Start: 2021-06-22 | End: 2022-03-13 | Stop reason: SDUPTHER

## 2021-06-22 RX ORDER — FENOFIBRATE 54 MG/1
54 TABLET ORAL DAILY
Qty: 90 TABLET | Refills: 3 | Status: SHIPPED | OUTPATIENT
Start: 2021-06-22 | End: 2021-10-22 | Stop reason: SDUPTHER

## 2021-06-22 RX ORDER — OLMESARTAN MEDOXOMIL 5 MG/1
5 TABLET ORAL DAILY
Qty: 90 TABLET | Refills: 1 | Status: SHIPPED | OUTPATIENT
Start: 2021-06-22 | End: 2021-10-22 | Stop reason: SDUPTHER

## 2021-06-22 RX ORDER — ATORVASTATIN CALCIUM 40 MG/1
40 TABLET, FILM COATED ORAL DAILY
Qty: 90 TABLET | Refills: 3 | Status: SHIPPED | OUTPATIENT
Start: 2021-06-22

## 2021-06-22 RX ORDER — CHOLECALCIFEROL (VITAMIN D3) 125 MCG
2000 CAPSULE ORAL DAILY
Qty: 90 TABLET | Refills: 3 | Status: SHIPPED | OUTPATIENT
Start: 2021-06-22 | End: 2021-10-22 | Stop reason: SDUPTHER

## 2021-06-22 NOTE — PATIENT INSTRUCTIONS
Sleep Apnea   WHAT YOU NEED TO KNOW:   What is sleep apnea? Sleep apnea is a condition that causes you to stop breathing for 10 seconds or longer during sleep  Obstructive sleep apnea is the most common kind  The muscles and tissues around your throat relax and block air from passing through  Obesity, use of alcohol or cigarettes, or a family history are common causes  Central sleep apnea means your brain does not send signals to the muscles that control breathing  You do not take a breath even though your airway is open  Common causes include medical conditions such as heart failure, being older than 65, or use of opioids  What are the signs and symptoms of sleep apnea? · Snoring loudly, snorting, gasping or choking while you sleep, and waking up suddenly because of these    · A hard time thinking, remembering things, or focusing on your tasks the following day    · Headache or nausea    · Waking up often during the night to urinate    · Feeling sleepy, slow, and tired during the day    How is sleep apnea treated? Your healthcare provider may have you do a sleep study if you have signs or symptoms  Sleep studies may monitor the stages of sleep, oxygen levels, body position, eye movement, and snoring  Treatment depends on the kind of apnea you have  · A machine  may be used to help you get more air during sleep  A mask may be placed over your nose and mouth, or just your nose  The mask is hooked to the machine  You will get air through the mask  ? A continuous positive airway pressure (CPAP) machine  is used to keep your airway open during sleep  The machine blows a gentle stream of air into the mask when you breathe  This helps keep your airway open so you can breathe more regularly  Extra oxygen may be given through the machine  ? A bilevel positive airway pressure (BiPAP) machine  gives air but lowers the pressure when you breathe out  ?  An adaptive servo-ventilator  is a machine that only gives air when it senses you are not breathing  · A mouth device  that looks like a mouth guard stops your tongue and other tissues from blocking airflow  · Surgery  may be needed to remove extra tissues that block your mouth, throat, or nose  How can I manage or prevent sleep apnea? · Do not smoke  Nicotine and other chemicals in cigarettes and cigars can cause lung damage  Ask your healthcare provider for information if you currently smoke and need help to quit  E-cigarettes or smokeless tobacco still contain nicotine  Talk to your healthcare provider before you use these products  · Do not drink alcohol or take sedative medicine before you go to sleep  Alcohol and sedatives can relax the muscles and tissues around your throat  This can block the airflow to your lungs  · Maintain a healthy weight  Your healthcare provider can tell you what weight is healthy for you  He or she can help you create a weight loss plan, if needed  The plan will include healthy foods and regular exercise to help you reach your healthy weight  Exercise can also help you sleep and may reduce stress  · Sleep on your side or use pillows designed to prevent sleep apnea  This prevents your tongue or other tissues from blocking your throat  You can also raise the head of your bed  Call your local emergency number (911 in the [address] [place] [address],[address]) if:   · You have chest pain or trouble breathing  When should I call my doctor? · You feel tired or depressed  · You have trouble staying awake during the day  · You have trouble thinking clearly  · You have questions or concerns about your condition or care  CARE AGREEMENT:   You have the right to help plan your care  Learn about your health condition and how it may be treated  Discuss treatment options with your healthcare providers to decide what care you want to receive  You always have the right to refuse treatment  The above information is an  only   It is not intended as medical advice for individual conditions or treatments  Talk to your doctor, nurse or pharmacist before following any medical regimen to see if it is safe and effective for you  © Copyright [address] Information is for End User's use only and may not be sold, redistributed or otherwise used for commercial purposes   All illustrations and images included in CareNotes® are the copyrighted property of [employer] D [employer]  or [address] [place] [address]

## 2021-06-22 NOTE — PROGRESS NOTES
Assessment/Plan:    Problem List Items Addressed This Visit     Essential hypertriglyceridemia    Relevant Medications    atorvastatin (LIPITOR) 40 mg tablet    fenofibrate (TRICOR) 54 MG tablet    Other Relevant Orders    Lipid Panel with Direct LDL reflex    GERD (gastroesophageal reflux disease)    Relevant Medications    lansoprazole (PREVACID) 30 mg capsule    Vitamin D deficiency    Relevant Medications    Cholecalciferol (Vitamin D3) 50 MCG (2000 UT) TABS    Essential hypertension - Primary    Relevant Medications    olmesartan (BENICAR) 5 mg tablet    Other Relevant Orders    Diagnostic Sleep Study    Class 1 obesity due to excess calories without serious comorbidity with body mass index (BMI) of 30 0 to 30 9 in adult      Other Visit Diagnoses     DEE (obstructive sleep apnea)        Relevant Orders    Diagnostic Sleep Study    Excessive daytime sleepiness        Relevant Orders    Diagnostic Sleep Study    Screening for deficiency anemia        Relevant Orders    CBC and differential    Screening for diabetes mellitus        Relevant Orders    Comprehensive metabolic panel    Screening for thyroid disorder        Relevant Orders    TSH, 3rd generation with Free T4 reflex    Fatigue, unspecified type        Relevant Orders    Diagnostic Sleep Study           Diagnoses and all orders for this visit:    Essential hypertension  -     olmesartan (BENICAR) 5 mg tablet; Take 1 tablet (5 mg total) by mouth daily  -     Cancel: Diagnostic Sleep Study; Future  -     Diagnostic Sleep Study; Future    Vitamin D deficiency  -     Cholecalciferol (Vitamin D3) 50 MCG (2000 UT) TABS; Take 1 tablet (2,000 Units total) by mouth daily    Essential hypertriglyceridemia  -     atorvastatin (LIPITOR) 40 mg tablet; Take 1 tablet (40 mg total) by mouth daily  -     fenofibrate (TRICOR) 54 MG tablet; Take 1 tablet (54 mg total) by mouth daily  -     Lipid Panel with Direct LDL reflex;  Future    Gastroesophageal reflux disease without esophagitis  -     lansoprazole (PREVACID) 30 mg capsule; Take 1 capsule (30 mg total) by mouth daily    Class 1 obesity due to excess calories without serious comorbidity with body mass index (BMI) of 30 0 to 30 9 in adult    DEE (obstructive sleep apnea)  -     Cancel: Diagnostic Sleep Study; Future  -     Diagnostic Sleep Study; Future    Excessive daytime sleepiness  -     Cancel: Diagnostic Sleep Study; Future  -     Diagnostic Sleep Study; Future    Screening for deficiency anemia  -     CBC and differential; Future    Screening for diabetes mellitus  -     Comprehensive metabolic panel; Future    Screening for thyroid disorder  -     TSH, 3rd generation with Free T4 reflex; Future    Fatigue, unspecified type  -     Diagnostic Sleep Study; Future        No problem-specific Assessment & Plan notes found for this encounter  Subjective:      Patient ID: Blanca Medina is a 54 y o  male  Blanca Medina presents with c/o witnessed nocturnal apnea, AM HA, excessive daytime sleepiness, and AM sore throat  Hypertension  This is a chronic problem  The problem is uncontrolled  Associated symptoms include headaches  Pertinent negatives include no anxiety, blurred vision, chest pain, neck pain, orthopnea, palpitations, peripheral edema, PND or shortness of breath  There are no associated agents to hypertension  Risk factors for coronary artery disease include dyslipidemia, male gender and obesity  Past treatments include angiotensin blockers  The current treatment provides mild improvement  There are no compliance problems  There is no history of angina, kidney disease, CAD/MI, CVA, heart failure, left ventricular hypertrophy, PVD or retinopathy  There is no history of chronic renal disease, a hypertension causing med, sleep apnea or a thyroid problem  Hyperlipidemia  This is a chronic problem  Exacerbating diseases include obesity   He has no history of chronic renal disease, diabetes, hypothyroidism, liver disease or nephrotic syndrome  There are no known factors aggravating his hyperlipidemia  Pertinent negatives include no chest pain, focal sensory loss, focal weakness, leg pain, myalgias or shortness of breath  Current antihyperlipidemic treatment includes statins and fibric acid derivatives  There are no compliance problems  Risk factors for coronary artery disease include hypertension, male sex and obesity  Depression  This is a chronic problem  Associated symptoms include fatigue, headaches and a sore throat  Pertinent negatives include no chest pain, myalgias or neck pain  Treatments tried: duloxetine, abilify  The treatment provided moderate relief  Fatigue  This is a recurrent problem  The problem has been unchanged  Associated symptoms include fatigue, headaches and a sore throat  Pertinent negatives include no chest pain, myalgias or neck pain  Exacerbated by: nocturnal apnea  The following portions of the patient's history were reviewed and updated as appropriate:   He has a past medical history of AC separation, right, sequela, Anxiety, Chest pain, Chronic arm pain, Chronic back pain, GERD (gastroesophageal reflux disease), Hyperlipidemia, and Vitamin D deficiency  ,  does not have any pertinent problems on file  ,   has a past surgical history that includes Tendon repair and Neuroplasty / transposition median nerve at carpal tunnel ,  family history includes Diabetes in his father and paternal uncle; No Known Problems in his mother  ,   reports that he has been smoking cigarettes  He has a 37 50 pack-year smoking history  He has never used smokeless tobacco  He reports current alcohol use of about 12 0 standard drinks of alcohol per week  He reports previous drug use ,  is allergic to other     Current Outpatient Medications   Medication Sig Dispense Refill    atorvastatin (LIPITOR) 40 mg tablet Take 1 tablet (40 mg total) by mouth daily 90 tablet 3    Cholecalciferol (Vitamin D3) 50 MCG (2000 UT) TABS Take 1 tablet (2,000 Units total) by mouth daily 90 tablet 3    DULoxetine (CYMBALTA) 60 mg delayed release capsule Take 1 capsule (60 mg total) by mouth daily 90 capsule 1    fenofibrate (TRICOR) 54 MG tablet Take 1 tablet (54 mg total) by mouth daily 90 tablet 3    lansoprazole (PREVACID) 30 mg capsule Take 1 capsule (30 mg total) by mouth daily 90 capsule 3    olmesartan (BENICAR) 5 mg tablet Take 1 tablet (5 mg total) by mouth daily 90 tablet 1    ARIPiprazole (ABILIFY) 5 mg tablet Take 1 tablet (5 mg total) by mouth daily (Patient not taking: Reported on 2/3/2021) 30 tablet 0    diclofenac sodium (VOLTAREN) 50 mg EC tablet Take 1 tablet (50 mg total) by mouth 3 (three) times a day (Patient not taking: Reported on 1/3/2021) 90 tablet 1    DULoxetine (CYMBALTA) 30 mg delayed release capsule Take 1 capsule (30 mg total) by mouth daily 30 capsule 1    ketorolac (TORADOL) 10 mg tablet Take 1 tablet (10 mg total) by mouth every 6 (six) hours as needed for moderate pain for up to 5 days (Patient not taking: Reported on 2/1/2021) 20 tablet 0    pregabalin (LYRICA) 100 mg capsule Take 1 capsule (100 mg total) by mouth 3 (three) times a day 90 capsule 1     No current facility-administered medications for this visit  BMI Counseling: Body mass index is 30 79 kg/m²  The BMI is above normal  Nutrition recommendations include decreasing portion sizes, consuming healthier snacks, limiting drinks that contain sugar, moderation in carbohydrate intake and reducing intake of cholesterol  Exercise recommendations include exercising 3-5 times per week  No pharmacotherapy was ordered  Tobacco Cessation Counseling: Tobacco cessation counseling was provided  The patient is sincerely urged to quit consumption of tobacco  He is not ready to quit tobacco          Review of Systems   Constitutional: Positive for fatigue  HENT: Positive for sore throat  Eyes: Negative    Negative for blurred vision  Respiratory: Negative  Negative for shortness of breath  Cardiovascular: Negative  Negative for chest pain, palpitations, orthopnea and PND  Gastrointestinal: Negative  Endocrine: Negative  Genitourinary: Negative  Musculoskeletal: Negative  Negative for myalgias and neck pain  Skin: Negative  Allergic/Immunologic: Negative  Neurological: Positive for headaches  Negative for focal weakness  Hematological: Negative  Psychiatric/Behavioral: Positive for depression  All other systems reviewed and are negative  Objective:  Vitals:    06/22/21 0659   BP: 142/82   BP Location: Left arm   Patient Position: Sitting   Cuff Size: Standard   Pulse: 89   Temp: 99 °F (37 2 °C)   TempSrc: Tympanic   SpO2: 98%   Weight: 103 kg (227 lb)   Height: 6' (1 829 m)     Body mass index is 30 79 kg/m²  Physical Exam  Vitals and nursing note reviewed  Constitutional:       Appearance: Normal appearance  He is well-developed  He is obese  HENT:      Head: Normocephalic and atraumatic  Right Ear: Tympanic membrane, ear canal and external ear normal       Left Ear: Tympanic membrane, ear canal and external ear normal       Nose: Nose normal       Mouth/Throat:      Mouth: Mucous membranes are moist       Pharynx: Uvula midline  Eyes:      General: Lids are normal       Conjunctiva/sclera: Conjunctivae normal       Pupils: Pupils are equal, round, and reactive to light  Neck:      Thyroid: No thyroid mass  Vascular: No JVD  Trachea: Trachea and phonation normal    Cardiovascular:      Rate and Rhythm: Normal rate and regular rhythm  Pulses: Normal pulses  Heart sounds: Normal heart sounds, S1 normal and S2 normal  No murmur heard  No friction rub  No gallop  Pulmonary:      Effort: Pulmonary effort is normal       Breath sounds: Normal breath sounds  Abdominal:      General: Bowel sounds are normal       Palpations: Abdomen is soft  Tenderness: There is no abdominal tenderness  Genitourinary:     Comments: Deferred  Musculoskeletal:         General: Normal range of motion  Cervical back: Full passive range of motion without pain, normal range of motion and neck supple  Right lower leg: No edema  Left lower leg: No edema  Lymphadenopathy:      Head:      Right side of head: No submental, submandibular, tonsillar, preauricular, posterior auricular or occipital adenopathy  Left side of head: No submental, submandibular, tonsillar, preauricular, posterior auricular or occipital adenopathy  Cervical: No cervical adenopathy  Skin:     General: Skin is warm and dry  Capillary Refill: Capillary refill takes less than 2 seconds  Neurological:      General: No focal deficit present  Mental Status: He is alert and oriented to person, place, and time  Cranial Nerves: Cranial nerves are intact  Sensory: Sensation is intact  Motor: Motor function is intact  Coordination: Coordination is intact  Gait: Gait is intact  Psychiatric:         Attention and Perception: Attention and perception normal          Mood and Affect: Mood and affect normal          Speech: Speech normal          Behavior: Behavior normal  Behavior is cooperative  Thought Content:  Thought content normal          Cognition and Memory: Cognition normal          Judgment: Judgment normal

## 2021-07-29 ENCOUNTER — TELEPHONE (OUTPATIENT)
Dept: SLEEP CENTER | Facility: CLINIC | Age: 46
End: 2021-07-29

## 2021-07-29 NOTE — TELEPHONE ENCOUNTER
----- Message from Vivek Fuller MD sent at 7/28/2021  3:26 PM EDT -----  Approved  ----- Message -----  From: Benny Moran MA  Sent: 7/28/2021   7:44 AM EDT  To: Sleep Medicine [facility] Provider    This sleep study needs approval      If approved please sign and return to clerical pool  If denied please include reasons why  Also provide alternative testing if warranted  Please sign and return to clerical pool

## 2021-08-03 ENCOUNTER — OFFICE VISIT (OUTPATIENT)
Dept: URGENT CARE | Facility: CLINIC | Age: 46
End: 2021-08-03
Payer: COMMERCIAL

## 2021-08-03 VITALS
OXYGEN SATURATION: 97 % | TEMPERATURE: 99.2 F | DIASTOLIC BLOOD PRESSURE: 88 MMHG | HEART RATE: 90 BPM | BODY MASS INDEX: 30.79 KG/M2 | SYSTOLIC BLOOD PRESSURE: 134 MMHG | WEIGHT: 227 LBS | RESPIRATION RATE: 20 BRPM

## 2021-08-03 DIAGNOSIS — S00.86XA INSECT BITE OF OTHER PART OF HEAD, INITIAL ENCOUNTER: ICD-10-CM
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
## 2021-08-03 DIAGNOSIS — K04.7 DENTAL INFECTION: Primary | ICD-10-CM

## 2021-08-03 DIAGNOSIS — K02.9 DENTAL DECAY: ICD-10-CM

## 2021-08-03 PROCEDURE — 99214 OFFICE O/P EST MOD 30 MIN: CPT | Performed by: NURSE PRACTITIONER

## 2021-08-03 RX ORDER — CHLORHEXIDINE GLUCONATE 0.12 MG/ML
15 RINSE ORAL 2 TIMES DAILY
Qty: 473 ML | Refills: 0 | Status: SHIPPED | OUTPATIENT
Start: 2021-08-03

## 2021-08-03 RX ORDER — AMOXICILLIN 500 MG/1
500 CAPSULE ORAL EVERY 8 HOURS SCHEDULED
Qty: 30 CAPSULE | Refills: 0 | Status: SHIPPED | OUTPATIENT
Start: 2021-08-03 | End: 2021-08-13

## 2021-08-03 NOTE — PATIENT INSTRUCTIONS
You have been prescribed amoxicillin and an oral rinse  - use as directed  You are to call your dentist for an appointment ASAP  You are to take tylenol every 4-6 hours and motrin every 6-8 hours as needed for pain  Try orajel over the counter  Apply ice to your head for the lump which may be an insect bite  Follow up with your PCP  Go to the ED if symptoms worsen    Insect Bite or Sting   WHAT YOU NEED TO KNOW:   Most insect bites and stings are not dangerous and go away without treatment  Your symptoms may be mild, or you may develop anaphylaxis  Anaphylaxis is a sudden, life-threatening reaction that needs immediate treatment  Common examples of insects that bite or sting are bees, ticks, mosquitoes, spiders, and ants  Insect bites or stings can lead to diseases such as malaria, West Nile virus, Lyme disease, or [place] Spotted Fever  DISCHARGE INSTRUCTIONS:   Call your local emergency number (911 in the [address] [facility]) for signs or symptoms of anaphylaxis,  such as trouble breathing, swelling in your mouth or throat, or wheezing  You may also have itching, a rash, hives, or feel like you are going to faint  Return to the emergency department if:   · You are stung on your tongue or in your throat  · A white area forms around the bite  · You are sweating badly or have body pain  · You think you were bitten or stung by a poisonous insect  Call your doctor if:   · You have a fever  · The area becomes red, warm, tender, and swollen beyond the area of the bite or sting  · You have questions or concerns about your condition or care  Medicines: You may need any of the following:  · Antihistamines  decrease itching and rash  · Epinephrine  is used to treat severe allergic reactions such as anaphylaxis  · Take your medicine as directed  Contact your healthcare provider if you think your medicine is not helping or if you have side effects  Tell him of her if you are allergic to any medicine   Keep a list of the medicines, vitamins, and herbs you take  Include the amounts, and when and why you take them  Bring the list or the pill bottles to follow-up visits  Carry your medicine list with you in case of an emergency  Steps to take for signs or symptoms of anaphylaxis:   · Immediately  give 1 shot of epinephrine only into the outer thigh muscle  · Leave the shot in place  as directed  Your healthcare provider may recommend you leave it in place for up to 10 seconds before you remove it  This helps make sure all of the epinephrine is delivered  · Call 911 and go to the emergency department,  even if the shot improved symptoms  Do not drive yourself  Bring the used epinephrine shot with you  Safety precautions to take if you are at risk for anaphylaxis:   · Keep 2 shots of epinephrine with you at all times  You may need a second shot, because epinephrine only works for about 20 minutes and symptoms may return  Your healthcare provider can show you and family members how to give the shot  Check the expiration date every month and replace it before it expires  · Create an action plan  Your healthcare provider can help you create a written plan that explains the allergy and an emergency plan to treat a reaction  The plan explains when to give a second epinephrine shot if symptoms return or do not improve after the first  Give copies of the action plan and emergency instructions to family members, work and school staff, and  providers  Show them how to give a shot of epinephrine  · Carry medical alert identification  Wear medical alert jewelry or carry a card that says you have an insect allergy  Ask your healthcare provider where to get these items  If an insect bites or stings you:   · Remove the stinger  Scrape the stinger out with your fingernail, edge of a credit card, or a knife blade  Do not squeeze the wound  Gently wash the area with soap and water      · Remove the tick   Ticks must be removed as soon as possible so you do not get diseases passed through tick bites  Ask your healthcare provider for more information on tick bites and how to remove ticks  Care for a bite or sting wound:   · Elevate (raise) the area above the level of your heart, if possible  Prop the area on pillows to keep it raised comfortably  Elevate the area for 10 to 20 minutes each hour or as directed by your healthcare provider  · Use compresses  Soak a clean washcloth in cold water, wring it out, and put it on the bite or sting  Use the compress for 10 to 20 minutes each hour or as directed by your healthcare provider  After 24 to 48 hours, change to warm compresses  · Apply a paste  Add water to baking soda to make a thick paste  Put the paste on the area for 5 minutes  Rinse gently to remove the paste  Prevent another insect bite or sting:   · Do not wear bright-colored or flower-print clothing when you plan to spend time outdoors  Do not use hairspray, perfumes, or aftershave  · Do not leave food out  · Empty any standing water and wash container with soap and water every 2 days  · Put screens on all open windows and doors  · Put insect repellent that contains DEET on skin that is showing when you go outside  Put insect repellent at the top of your boots, bottom of pant legs, and sleeve cuffs  Wear long sleeves, pants, and shoes  · Use citronella candles outdoors to help keep mosquitoes away  Put a tick and flea collar on pets  Follow up with your doctor as directed:  Write down your questions so you remember to ask them during your visits  © Copyright [employer] 2021 Information is for End User's use only and may not be sold, redistributed or otherwise used for commercial purposes  All illustrations and images included in CareNotes® are the copyrighted property of [employer] M [employer]  or Eren Camarena  The above information is an  only   It is not intended as medical advice for individual conditions or treatments  Talk to your doctor, nurse or pharmacist before following any medical regimen to see if it is safe and effective for you  Dental Caries   WHAT YOU NEED TO KNOW:   Dental caries are also called cavities  Cavities are caused by bacteria  When the bacteria in tooth plaque (sticky film) mix with certain types of carbohydrate, this creates acid  The acid breaks down areas of enamel, which covers the outside of a tooth  This creates a small hole in the tooth called a cavity  DISCHARGE INSTRUCTIONS:   Return to the emergency department if:   · Your face, jaw, cheek, eye, or neck begin to swell  Contact your dentist if:   · You have a fever  · Your tooth pain gets worse  · You have questions or concerns about your condition or care  Follow up with your dentist as directed:  Write down your questions so you remember to ask them during your visits  Prevent dental caries:   · Brush your teeth at least 2 times a day with fluoride toothpaste  · Use dental floss to clean between your teeth at least once a day  · Rinse your mouth with water or mouthwash after meals and snacks  · Chew sugarless gum after meals and snacks  · See your dentist regularly for dental cleanings and oral exams  © 2017 [address] is for End User's use only and may not be sold, redistributed or otherwise used for commercial purposes  All illustrations and images included in CareNotes® are the copyrighted property of [employer]  or Robbie Bains  The above information is an  only  It is not intended as medical advice for individual conditions or treatments  Talk to your doctor, nurse or pharmacist before following any medical regimen to see if it is safe and effective for you  Dental Abscess   WHAT YOU NEED TO KNOW:   A dental abscess is a collection of pus in or around a tooth   A dental abscess is caused by bacteria  The bacteria can enter the tooth when the enamel (outer part of the tooth) is damaged by tooth decay  Bacteria can also enter the tooth through a chip in the tooth or a cut in the gum  Food particles that are stuck between the teeth for a long time may also lead to an abscess  DISCHARGE INSTRUCTIONS:   Return to the emergency department if:   · You have severe pain in your tooth or jaw  · You have trouble breathing because of pain or swelling  Call your doctor if:   · Your symptoms get worse, even after treatment  · Your mouth is bleeding  · You cannot eat or drink because of pain or swelling  · Your abscess returns  · You have an injury that causes a crack in your tooth  · You have questions or concerns about your condition or care  Medicines: You may  need any of the following:  · Antibiotics  help treat a bacterial infection  · NSAIDs , such as ibuprofen, help decrease swelling, pain, and fever  This medicine is available with or without a doctor's order  NSAIDs can cause stomach bleeding or kidney problems in certain people  If you take blood thinner medicine, always ask your healthcare provider if NSAIDs are safe for you  Always read the medicine label and follow directions  · Acetaminophen  decreases pain and fever  It is available without a doctor's order  Ask how much to take and how often to take it  Follow directions  Read the labels of all other medicines you are using to see if they also contain acetaminophen, or ask your doctor or pharmacist  Acetaminophen can cause liver damage if not taken correctly  Do not use more than 4 grams (4,000 milligrams) total of acetaminophen in one day  · Prescription pain medicine  may be given  Ask your healthcare provider how to take this medicine safely  Some prescription pain medicines contain acetaminophen  Do not take other medicines that contain acetaminophen without talking to your healthcare provider  Too much acetaminophen may cause liver damage  Prescription pain medicine may cause constipation  Ask your healthcare provider how to prevent or treat constipation  · Take your medicine as directed  Contact your healthcare provider if you think your medicine is not helping or if you have side effects  Tell him of her if you are allergic to any medicine  Keep a list of the medicines, vitamins, and herbs you take  Include the amounts, and when and why you take them  Bring the list or the pill bottles to follow-up visits  Carry your medicine list with you in case of an emergency  Self-care:   · Rinse your mouth every 2 hours with salt water  This will help keep the area clean  · Gently brush your teeth twice a day with a soft tooth brush  This will help keep the area clean  · Eat soft foods as directed  Soft foods may cause less pain  Examples include applesauce, yogurt, and cooked pasta  Ask your healthcare provider how long to follow this instruction  · Apply a warm compress to your tooth or gum  Use a cotton ball or gauze soaked in warm water  Remove the compress in 10 minutes or when it becomes cool  Repeat 3 times a day  Prevent another abscess:   · Brush your teeth at least 2 times a day  with fluoride toothpaste  · Use dental floss at least once a day  to clean between your teeth  · Rinse your mouth with water or mouthwash  after meals and snacks  Chew sugarless gum  · Avoid sugary and starchy food that can stick between your teeth  Limit drinks high in sugar, such as soda or fruit juice  · See your dentist every 6 months  for dental cleanings and oral exams  Follow up with your doctor or dentist in 24 hours, or as directed: Your healthcare provider will need to check your teeth and gums  Write down your questions so you remember to ask them during your visits     © Copyright [employer] 2021 Information is for End User's use only and may not be sold, redistributed or otherwise used for commercial purposes  All illustrations and images included in CareNotes® are the copyrighted property of [employer]  or Eren Camarena  The above information is an  only  It is not intended as medical advice for individual conditions or treatments  Talk to your doctor, nurse or pharmacist before following any medical regimen to see if it is safe and effective for you

## 2021-08-03 NOTE — PROGRESS NOTES
[facility]        NAME: Elisa Talamantes is a 54 y o  male  : 1975    MRN: [id number]  DATE: August 3, 2021  TIME: 12:21 PM    Assessment and Plan   Dental infection [K04 7]  1  Dental infection  amoxicillin (AMOXIL) 500 mg capsule    chlorhexidine (PERIDEX) 0 12 % solution   2  Dental decay  amoxicillin (AMOXIL) 500 mg capsule    chlorhexidine (PERIDEX) 0 12 % solution   3  Insect bite of other part of head, initial encounter           Patient Instructions       Follow up with PCP in 3-5 days  Proceed to  ER if symptoms worsen  You have been prescribed amoxicillin and an oral rinse  - use as directed  You are to call your dentist for an appointment ASAP  You are to take tylenol every 4-6 hours and motrin every 6-8 hours as needed for pain  Try orajel over the counter  Apply ice to your head for the lump which may be an insect bite  Follow up with your PCP  Go to the ED if symptoms worsen          Chief Complaint     Chief Complaint   Patient presents with    Oral Pain     right lower    Mass     head         History of Present Illness       This is a 54year old male who states has a gum infection and it radiates up into his cheek, eye and forehead  He states he has dental caries  He also states that he noted a small lump on the right side of his forehead - it is not tender or painful  No itching  Oral Pain         Review of Systems   Review of Systems   Constitutional: Negative  HENT: Positive for dental problem  Eyes: Negative  Respiratory: Negative  Cardiovascular: Negative  Gastrointestinal: Negative  Endocrine: Negative  Genitourinary: Negative  Musculoskeletal: Negative  Skin: Positive for wound  Allergic/Immunologic: Negative  Neurological: Negative  Hematological: Negative  Psychiatric/Behavioral: Negative            Current Medications       Current Outpatient Medications:     atorvastatin (LIPITOR) 40 mg tablet, Take 1 tablet (40 mg total) by mouth daily, Disp: 90 tablet, Rfl: 3    Cholecalciferol (Vitamin D3) 50 MCG (2000 UT) TABS, Take 1 tablet (2,000 Units total) by mouth daily, Disp: 90 tablet, Rfl: 3    DULoxetine (CYMBALTA) 60 mg delayed release capsule, Take 1 capsule (60 mg total) by mouth daily, Disp: 90 capsule, Rfl: 1    fenofibrate (TRICOR) 54 MG tablet, Take 1 tablet (54 mg total) by mouth daily, Disp: 90 tablet, Rfl: 3    lansoprazole (PREVACID) 30 mg capsule, Take 1 capsule (30 mg total) by mouth daily, Disp: 90 capsule, Rfl: 3    olmesartan (BENICAR) 5 mg tablet, Take 1 tablet (5 mg total) by mouth daily, Disp: 90 tablet, Rfl: 1    amoxicillin (AMOXIL) 500 mg capsule, Take 1 capsule (500 mg total) by mouth every 8 (eight) hours for 10 days, Disp: 30 capsule, Rfl: 0    ARIPiprazole (ABILIFY) 5 mg tablet, Take 1 tablet (5 mg total) by mouth daily (Patient not taking: Reported on 2/3/2021), Disp: 30 tablet, Rfl: 0    chlorhexidine (PERIDEX) 0 12 % solution, Apply 15 mL to the mouth or throat 2 (two) times a day, Disp: 473 mL, Rfl: 0    diclofenac sodium (VOLTAREN) 50 mg EC tablet, Take 1 tablet (50 mg total) by mouth 3 (three) times a day (Patient not taking: Reported on 1/3/2021), Disp: 90 tablet, Rfl: 1    DULoxetine (CYMBALTA) 30 mg delayed release capsule, Take 1 capsule (30 mg total) by mouth daily, Disp: 30 capsule, Rfl: 1    ketorolac (TORADOL) 10 mg tablet, Take 1 tablet (10 mg total) by mouth every 6 (six) hours as needed for moderate pain for up to 5 days (Patient not taking: Reported on 2/1/2021), Disp: 20 tablet, Rfl: 0    pregabalin (LYRICA) 100 mg capsule, Take 1 capsule (100 mg total) by mouth 3 (three) times a day, Disp: 90 capsule, Rfl: 1    Current Allergies     Allergies as of 08/03/2021 - Reviewed 08/03/2021   Allergen Reaction Noted    Other  06/23/2014            The following portions of the patient's history were reviewed and updated as appropriate: allergies, current medications, past family history, past medical history, past social history, past surgical history and problem list      Past Medical History:   Diagnosis Date    AC separation, right, sequela     Anxiety     Chest pain     Chronic arm pain     Chronic back pain     GERD (gastroesophageal reflux disease)     Hyperlipidemia     Vitamin D deficiency        Past Surgical History:   Procedure Laterality Date    NEUROPLASTY / TRANSPOSITION MEDIAN NERVE AT CARPAL TUNNEL      TENDON REPAIR      Extensor       Family History   Problem Relation Age of Onset    No Known Problems Mother     Diabetes Father         Mellitus    Diabetes Paternal Uncle          Medications have been verified  Objective   /88   Pulse 90   Temp 99 2 °F (37 3 °C)   Resp 20   Wt 103 kg (227 lb)   SpO2 97%   BMI 30 79 kg/m²   No LMP for male patient  Physical Exam     Physical Exam  Vitals and nursing note reviewed  Constitutional:       General: He is not in acute distress  Appearance: Normal appearance  He is obese  He is not ill-appearing, toxic-appearing or diaphoretic  HENT:      Head: Normocephalic and atraumatic  Right Ear: Tympanic membrane, ear canal and external ear normal       Left Ear: Tympanic membrane, ear canal and external ear normal       Nose: Nose normal       Mouth/Throat:      Mouth: Mucous membranes are moist       Pharynx: No posterior oropharyngeal erythema  Eyes:      Extraocular Movements: Extraocular movements intact  Cardiovascular:      Rate and Rhythm: Normal rate  Pulses: Normal pulses  Pulmonary:      Effort: Pulmonary effort is normal    Musculoskeletal:         General: Normal range of motion  Cervical back: Normal range of motion  Skin:     General: Skin is warm and dry  Capillary Refill: Capillary refill takes less than 2 seconds  Neurological:      General: No focal deficit present  Mental Status: He is alert and oriented to person, place, and time  Psychiatric:         Mood and Affect: Mood normal          Behavior: Behavior normal          Thought Content:  Thought content normal          Judgment: Judgment normal

## 2021-08-24 DIAGNOSIS — R06.83 SNORING: Primary | ICD-10-CM

## 2022-01-11 ENCOUNTER — VBI (OUTPATIENT)
Dept: ADMINISTRATIVE | Facility: OTHER | Age: 47
End: 2022-01-11

## 2022-02-03 DIAGNOSIS — I10 ESSENTIAL HYPERTENSION: ICD-10-CM

## 2022-02-03 DIAGNOSIS — E55.9 VITAMIN D DEFICIENCY: ICD-10-CM

## 2022-02-03 DIAGNOSIS — F33.41 RECURRENT MAJOR DEPRESSIVE DISORDER, IN PARTIAL REMISSION (HCC): ICD-10-CM

## 2022-02-03 DIAGNOSIS — E78.1 ESSENTIAL HYPERTRIGLYCERIDEMIA: ICD-10-CM

## 2022-02-03 RX ORDER — FENOFIBRATE 54 MG/1
54 TABLET ORAL DAILY
Qty: 30 TABLET | Refills: 0 | Status: SHIPPED | OUTPATIENT
Start: 2022-02-03 | End: 2022-03-13 | Stop reason: SDUPTHER

## 2022-02-03 RX ORDER — OLMESARTAN MEDOXOMIL 5 MG/1
5 TABLET ORAL DAILY
Qty: 30 TABLET | Refills: 0 | Status: SHIPPED | OUTPATIENT
Start: 2022-02-03 | End: 2022-03-13 | Stop reason: SDUPTHER

## 2022-02-03 RX ORDER — CHOLECALCIFEROL (VITAMIN D3) 125 MCG
2000 CAPSULE ORAL DAILY
Qty: 30 TABLET | Refills: 0 | Status: SHIPPED | OUTPATIENT
Start: 2022-02-03 | End: 2022-03-13 | Stop reason: SDUPTHER

## 2022-02-03 RX ORDER — DULOXETIN HYDROCHLORIDE 60 MG/1
60 CAPSULE, DELAYED RELEASE ORAL DAILY
Qty: 30 CAPSULE | Refills: 0 | Status: SHIPPED | OUTPATIENT
Start: 2022-02-03 | End: 2022-03-13 | Stop reason: SDUPTHER

## 2022-02-07 ENCOUNTER — PATIENT MESSAGE (OUTPATIENT)
Dept: FAMILY MEDICINE CLINIC | Facility: CLINIC | Age: 47
End: 2022-02-07

## 2022-02-07 ENCOUNTER — TELEPHONE (OUTPATIENT)
Dept: FAMILY MEDICINE CLINIC | Facility: CLINIC | Age: 47
End: 2022-02-07

## 2022-02-07 NOTE — TELEPHONE ENCOUNTER
Pt had medication request, he is due for serology and these have been ordered in chart  It appears that office reached out to advise Pt via [employer] - He has an appt scheduled for tomorrow but serology is not pending   If Pt cannot get these done today, he should R/S

## 2022-02-08 ENCOUNTER — OFFICE VISIT (OUTPATIENT)
Dept: FAMILY MEDICINE CLINIC | Facility: CLINIC | Age: 47
End: 2022-02-08
Payer: COMMERCIAL

## 2022-02-08 VITALS
HEART RATE: 84 BPM | SYSTOLIC BLOOD PRESSURE: 120 MMHG | OXYGEN SATURATION: 96 % | WEIGHT: 217 LBS | BODY MASS INDEX: 29.39 KG/M2 | DIASTOLIC BLOOD PRESSURE: 78 MMHG | TEMPERATURE: 98.6 F | HEIGHT: 72 IN

## 2022-02-08 DIAGNOSIS — M54.42 ACUTE BILATERAL LOW BACK PAIN WITH LEFT-SIDED SCIATICA: ICD-10-CM

## 2022-02-08 DIAGNOSIS — G47.19 EXCESSIVE DAYTIME SLEEPINESS: ICD-10-CM

## 2022-02-08 DIAGNOSIS — I10 ESSENTIAL HYPERTENSION: ICD-10-CM

## 2022-02-08 DIAGNOSIS — R06.83 SNORING: ICD-10-CM

## 2022-02-08 DIAGNOSIS — M54.2 NECK PAIN ON LEFT SIDE: ICD-10-CM

## 2022-02-08 DIAGNOSIS — R53.83 FATIGUE, UNSPECIFIED TYPE: ICD-10-CM

## 2022-02-08 DIAGNOSIS — Z00.00 ANNUAL PHYSICAL EXAM: Primary | ICD-10-CM

## 2022-02-08 PROCEDURE — 3008F BODY MASS INDEX DOCD: CPT | Performed by: NURSE PRACTITIONER

## 2022-02-08 PROCEDURE — 99396 PREV VISIT EST AGE 40-64: CPT | Performed by: NURSE PRACTITIONER

## 2022-02-08 PROCEDURE — 3078F DIAST BP <80 MM HG: CPT | Performed by: NURSE PRACTITIONER

## 2022-02-08 PROCEDURE — 3074F SYST BP LT 130 MM HG: CPT | Performed by: NURSE PRACTITIONER

## 2022-02-08 PROCEDURE — 4004F PT TOBACCO SCREEN RCVD TLK: CPT | Performed by: NURSE PRACTITIONER

## 2022-02-08 RX ORDER — CYCLOBENZAPRINE HCL 10 MG
10 TABLET ORAL 3 TIMES DAILY PRN
Qty: 30 TABLET | Refills: 0 | Status: SHIPPED | OUTPATIENT
Start: 2022-02-08 | End: 2022-03-13 | Stop reason: SDUPTHER

## 2022-02-08 RX ORDER — METHYLPREDNISOLONE 4 MG/1
TABLET ORAL
Qty: 21 EACH | Refills: 0 | Status: SHIPPED | OUTPATIENT
Start: 2022-02-08 | End: 2022-05-11 | Stop reason: ALTCHOICE

## 2022-02-08 NOTE — PROGRESS NOTES
Assessment/Plan:    Problem List Items Addressed This Visit     Essential hypertension    Relevant Orders    Home Study      Other Visit Diagnoses     Annual physical exam    -  Primary    Excessive daytime sleepiness        Relevant Orders    Home Study    Snoring        Relevant Orders    Home Study    Fatigue, unspecified type        Relevant Orders    Home Study    Acute bilateral low back pain with left-sided sciatica        Relevant Medications    cyclobenzaprine (FLEXERIL) 10 mg tablet    methylPREDNISolone 4 MG tablet therapy pack    Neck pain on left side        Relevant Medications    cyclobenzaprine (FLEXERIL) 10 mg tablet    methylPREDNISolone 4 MG tablet therapy pack           Diagnoses and all orders for this visit:    Annual physical exam    Excessive daytime sleepiness  -     Home Study; Future    Snoring  -     Home Study; Future    Fatigue, unspecified type  -     Home Study; Future    Essential hypertension  -     Home Study; Future    Acute bilateral low back pain with left-sided sciatica  -     cyclobenzaprine (FLEXERIL) 10 mg tablet; Take 1 tablet (10 mg total) by mouth 3 (three) times a day as needed for muscle spasms  -     methylPREDNISolone 4 MG tablet therapy pack; Use as directed on package    Neck pain on left side  -     cyclobenzaprine (FLEXERIL) 10 mg tablet; Take 1 tablet (10 mg total) by mouth 3 (three) times a day as needed for muscle spasms  -     methylPREDNISolone 4 MG tablet therapy pack; Use as directed on package        No problem-specific Assessment & Plan notes found for this encounter  Subjective:      Patient ID: Riley Galvez is a 51 y o  male  Patient with depression, nicotine dependence, HTN, hypertriglyceridemia, lumbar radiculopathy, GERD, cervical radidulopathy presents for annual exam with several issues  Anticipated the patient was to have routine labs done prior to today's visit  Serology was not completed prior to today's visit   He states he will get done within the week  Patient states about 4 days ago he fell on ice and now has back pain  He fell on left buttocks  Denies weakness of BLLE or bowel/bladder involvement  Patient came to complains of snoring at night  This is in old complaint the patient was ordered for a sleep study  Patient has not done sleep studies requesting for us to order another order  Pt INS will only cover at home sleep testing  Back Pain  This is a new problem  The current episode started in the past 7 days  The pain is present in the lumbar spine (cervical)  The quality of the pain is described as aching  The pain radiates to the left thigh  The pain is at a severity of 8/10  The pain is moderate  The symptoms are aggravated by bending and standing  Stiffness is present all day  Associated symptoms include headaches  Pertinent negatives include no chest pain  Risk factors: fell on ice  He has tried heat for the symptoms  The treatment provided no relief  Hypertension  This is a chronic problem  The problem is controlled  Associated symptoms include headaches  Pertinent negatives include no chest pain or neck pain  Risk factors for coronary artery disease include dyslipidemia and male gender  Past treatments include angiotensin blockers  The current treatment provides significant improvement  There are no compliance problems  Hyperlipidemia  This is a chronic problem  Condition status: unable to assess 2/2 no serology ac OV  Factors aggravating his hyperlipidemia include fatty foods  Pertinent negatives include no chest pain or myalgias  Current antihyperlipidemic treatment includes statins and fibric acid derivatives  There are no compliance problems  Risk factors for coronary artery disease include male sex and hypertension  Depression  This is a chronic problem  Associated symptoms include fatigue, headaches and a sore throat  Pertinent negatives include no chest pain, myalgias or neck pain   Treatments tried: duloxetine, abilify  The treatment provided moderate relief  Fatigue  This is a recurrent problem  The problem has been unchanged  Associated symptoms include fatigue, headaches and a sore throat  Pertinent negatives include no chest pain, myalgias or neck pain  Exacerbated by: nocturnal apnea  He has tried nothing for the symptoms  The following portions of the patient's history were reviewed and updated as appropriate:   He has a past medical history of AC separation, right, sequela, Anxiety, Chest pain, Chronic arm pain, Chronic back pain, GERD (gastroesophageal reflux disease), Hyperlipidemia, and Vitamin D deficiency  ,  does not have any pertinent problems on file  ,   has a past surgical history that includes Tendon repair and Neuroplasty / transposition median nerve at carpal tunnel ,  family history includes Diabetes in his father and paternal uncle; No Known Problems in his mother  ,   reports that he has been smoking cigarettes  He has a 37 50 pack-year smoking history  He has never used smokeless tobacco  He reports current alcohol use of about 12 0 standard drinks of alcohol per week  He reports previous drug use ,  is allergic to other     Current Outpatient Medications   Medication Sig Dispense Refill    atorvastatin (LIPITOR) 40 mg tablet Take 1 tablet (40 mg total) by mouth daily 90 tablet 3    Cholecalciferol (Vitamin D3) 50 MCG (2000 UT) TABS Take 1 tablet (2,000 Units total) by mouth daily 30 tablet 0    DULoxetine (CYMBALTA) 60 mg delayed release capsule Take 1 capsule (60 mg total) by mouth daily 30 capsule 0    fenofibrate (TRICOR) 54 MG tablet Take 1 tablet (54 mg total) by mouth daily 30 tablet 0    lansoprazole (PREVACID) 30 mg capsule Take 1 capsule (30 mg total) by mouth daily 90 capsule 3    olmesartan (BENICAR) 5 mg tablet Take 1 tablet (5 mg total) by mouth daily 30 tablet 0    ARIPiprazole (ABILIFY) 5 mg tablet Take 1 tablet (5 mg total) by mouth daily (Patient not taking: Reported on 2/3/2021) 30 tablet 0    chlorhexidine (PERIDEX) 0 12 % solution Apply 15 mL to the mouth or throat 2 (two) times a day (Patient not taking: Reported on 2/8/2022 ) 473 mL 0    cyclobenzaprine (FLEXERIL) 10 mg tablet Take 1 tablet (10 mg total) by mouth 3 (three) times a day as needed for muscle spasms 30 tablet 0    diclofenac sodium (VOLTAREN) 50 mg EC tablet Take 1 tablet (50 mg total) by mouth 3 (three) times a day (Patient not taking: Reported on 1/3/2021) 90 tablet 1    DULoxetine (CYMBALTA) 30 mg delayed release capsule Take 1 capsule (30 mg total) by mouth daily 30 capsule 1    ketorolac (TORADOL) 10 mg tablet Take 1 tablet (10 mg total) by mouth every 6 (six) hours as needed for moderate pain for up to 5 days (Patient not taking: Reported on 2/1/2021) 20 tablet 0    methylPREDNISolone 4 MG tablet therapy pack Use as directed on package 21 each 0    pregabalin (LYRICA) 100 mg capsule Take 1 capsule (100 mg total) by mouth 3 (three) times a day 90 capsule 1     No current facility-administered medications for this visit  BMI Counseling: Body mass index is 29 43 kg/m²  The BMI is above normal  Nutrition recommendations include decreasing portion sizes, consuming healthier snacks, limiting drinks that contain sugar, moderation in carbohydrate intake and reducing intake of cholesterol  Exercise recommendations include exercising 3-5 times per week  No pharmacotherapy was ordered  Rationale for BMI follow-up plan is due to patient being overweight or obese  Review of Systems   Constitutional: Positive for fatigue  HENT: Positive for sore throat  Cardiovascular: Negative for chest pain  Musculoskeletal: Positive for back pain  Negative for myalgias and neck pain  Neurological: Positive for headaches  Psychiatric/Behavioral: Positive for depression           Objective:  Vitals:    02/08/22 1213   BP: 120/78   BP Location: Left arm   Patient Position: Sitting   Cuff Size: Large   Pulse: 84   Temp: 98 6 °F (37 °C)   TempSrc: Tympanic   SpO2: 96%   Weight: 98 4 kg (217 lb)   Height: 6' (1 829 m)     Body mass index is 29 43 kg/m²  Physical Exam  Vitals and nursing note reviewed  Constitutional:       Appearance: Normal appearance  He is well-developed  Interventions: Face mask in place  HENT:      Head: Normocephalic and atraumatic  Right Ear: Tympanic membrane, ear canal and external ear normal       Left Ear: Tympanic membrane, ear canal and external ear normal       Nose: Nose normal       Mouth/Throat:      Mouth: Mucous membranes are moist       Pharynx: Uvula midline  Eyes:      General: Lids are normal       Conjunctiva/sclera: Conjunctivae normal       Pupils: Pupils are equal, round, and reactive to light  Neck:      Thyroid: No thyroid mass  Vascular: No JVD  Trachea: Trachea and phonation normal    Cardiovascular:      Rate and Rhythm: Normal rate and regular rhythm  Pulses: Normal pulses  Heart sounds: Normal heart sounds, S1 normal and S2 normal  No murmur heard  No friction rub  No gallop  Pulmonary:      Effort: Pulmonary effort is normal       Breath sounds: Normal breath sounds  Abdominal:      General: Bowel sounds are normal       Palpations: Abdomen is soft  Tenderness: There is no abdominal tenderness  Genitourinary:     Comments: Deferred  Musculoskeletal:         General: Normal range of motion  Cervical back: Full passive range of motion without pain, normal range of motion and neck supple  Lumbar back: Tenderness present  No bony tenderness  Back:       Right lower leg: No edema  Left lower leg: No edema  Lymphadenopathy:      Head:      Right side of head: No submental, submandibular, tonsillar, preauricular, posterior auricular or occipital adenopathy  Left side of head: No submental, submandibular, tonsillar, preauricular, posterior auricular or occipital adenopathy  Cervical: No cervical adenopathy  Skin:     General: Skin is warm and dry  Capillary Refill: Capillary refill takes less than 2 seconds  Neurological:      General: No focal deficit present  Mental Status: He is alert and oriented to person, place, and time  Cranial Nerves: Cranial nerves are intact  Sensory: Sensation is intact  Motor: Motor function is intact  Coordination: Coordination is intact  Gait: Gait is intact  Psychiatric:         Attention and Perception: Attention and perception normal          Mood and Affect: Mood and affect normal          Speech: Speech normal          Behavior: Behavior normal  Behavior is cooperative  Thought Content:  Thought content normal          Cognition and Memory: Cognition normal          Judgment: Judgment normal

## 2022-02-28 ENCOUNTER — APPOINTMENT (OUTPATIENT)
Dept: LAB | Facility: CLINIC | Age: 47
End: 2022-02-28
Payer: COMMERCIAL

## 2022-02-28 DIAGNOSIS — Z13.29 SCREENING FOR THYROID DISORDER: ICD-10-CM

## 2022-02-28 DIAGNOSIS — E78.1 ESSENTIAL HYPERTRIGLYCERIDEMIA: ICD-10-CM

## 2022-02-28 DIAGNOSIS — Z13.1 SCREENING FOR DIABETES MELLITUS: ICD-10-CM

## 2022-02-28 DIAGNOSIS — Z13.0 SCREENING FOR DEFICIENCY ANEMIA: ICD-10-CM

## 2022-02-28 DIAGNOSIS — Z11.59 NEED FOR HEPATITIS C SCREENING TEST: ICD-10-CM

## 2022-02-28 LAB
ALBUMIN SERPL BCP-MCNC: 4.2 G/DL (ref 3.5–5)
ALP SERPL-CCNC: 72 U/L (ref 46–116)
ALT SERPL W P-5'-P-CCNC: 52 U/L (ref 12–78)
ANION GAP SERPL CALCULATED.3IONS-SCNC: 5 MMOL/L (ref 4–13)
AST SERPL W P-5'-P-CCNC: 20 U/L (ref 5–45)
BASOPHILS # BLD AUTO: 0.18 THOUSANDS/ΜL (ref 0–0.1)
BASOPHILS NFR BLD AUTO: 2 % (ref 0–1)
BILIRUB SERPL-MCNC: 0.35 MG/DL (ref 0.2–1)
BUN SERPL-MCNC: 17 MG/DL (ref 5–25)
CALCIUM SERPL-MCNC: 10 MG/DL (ref 8.3–10.1)
CHLORIDE SERPL-SCNC: 106 MMOL/L (ref 100–108)
CHOLEST SERPL-MCNC: 152 MG/DL
CO2 SERPL-SCNC: 28 MMOL/L (ref 21–32)
CREAT SERPL-MCNC: 0.84 MG/DL (ref 0.6–1.3)
EOSINOPHIL # BLD AUTO: 0.64 THOUSAND/ΜL (ref 0–0.61)
EOSINOPHIL NFR BLD AUTO: 6 % (ref 0–6)
ERYTHROCYTE [DISTWIDTH] IN BLOOD BY AUTOMATED COUNT: 14 % (ref 11.6–15.1)
GFR SERPL CREATININE-BSD FRML MDRD: 104 ML/MIN/1.73SQ M
GLUCOSE P FAST SERPL-MCNC: 96 MG/DL (ref 65–99)
HCT VFR BLD AUTO: 46.3 % (ref 36.5–49.3)
HCV AB SER QL: NORMAL
HDLC SERPL-MCNC: 40 MG/DL
HGB BLD-MCNC: 15.2 G/DL (ref 12–17)
IMM GRANULOCYTES # BLD AUTO: 0.03 THOUSAND/UL (ref 0–0.2)
IMM GRANULOCYTES NFR BLD AUTO: 0 % (ref 0–2)
LDLC SERPL DIRECT ASSAY-MCNC: 68 MG/DL (ref 0–100)
LYMPHOCYTES # BLD AUTO: 4.22 THOUSANDS/ΜL (ref 0.6–4.47)
LYMPHOCYTES NFR BLD AUTO: 37 % (ref 14–44)
MCH RBC QN AUTO: 30 PG (ref 26.8–34.3)
MCHC RBC AUTO-ENTMCNC: 32.8 G/DL (ref 31.4–37.4)
MCV RBC AUTO: 91 FL (ref 82–98)
MONOCYTES # BLD AUTO: 1.42 THOUSAND/ΜL (ref 0.17–1.22)
MONOCYTES NFR BLD AUTO: 12 % (ref 4–12)
NEUTROPHILS # BLD AUTO: 5.05 THOUSANDS/ΜL (ref 1.85–7.62)
NEUTS SEG NFR BLD AUTO: 43 % (ref 43–75)
NRBC BLD AUTO-RTO: 0 /100 WBCS
PLATELET # BLD AUTO: 314 THOUSANDS/UL (ref 149–390)
PMV BLD AUTO: 10 FL (ref 8.9–12.7)
POTASSIUM SERPL-SCNC: 4.1 MMOL/L (ref 3.5–5.3)
PROT SERPL-MCNC: 7.8 G/DL (ref 6.4–8.2)
RBC # BLD AUTO: 5.07 MILLION/UL (ref 3.88–5.62)
SODIUM SERPL-SCNC: 139 MMOL/L (ref 136–145)
TRIGL SERPL-MCNC: 445 MG/DL
TSH SERPL DL<=0.05 MIU/L-ACNC: 3.34 UIU/ML (ref 0.36–3.74)
WBC # BLD AUTO: 11.54 THOUSAND/UL (ref 4.31–10.16)

## 2022-02-28 PROCEDURE — 80061 LIPID PANEL: CPT

## 2022-02-28 PROCEDURE — 83721 ASSAY OF BLOOD LIPOPROTEIN: CPT

## 2022-02-28 PROCEDURE — 36415 COLL VENOUS BLD VENIPUNCTURE: CPT

## 2022-02-28 PROCEDURE — 86803 HEPATITIS C AB TEST: CPT

## 2022-02-28 PROCEDURE — 84443 ASSAY THYROID STIM HORMONE: CPT

## 2022-02-28 PROCEDURE — 85025 COMPLETE CBC W/AUTO DIFF WBC: CPT

## 2022-02-28 PROCEDURE — 80053 COMPREHEN METABOLIC PANEL: CPT

## 2022-05-09 DIAGNOSIS — I10 ESSENTIAL HYPERTENSION: ICD-10-CM

## 2022-05-09 DIAGNOSIS — M54.42 ACUTE BILATERAL LOW BACK PAIN WITH LEFT-SIDED SCIATICA: ICD-10-CM

## 2022-05-09 DIAGNOSIS — M54.2 NECK PAIN ON LEFT SIDE: ICD-10-CM

## 2022-05-10 RX ORDER — OLMESARTAN MEDOXOMIL 5 MG/1
5 TABLET ORAL DAILY
Qty: 90 TABLET | Refills: 0 | Status: SHIPPED | OUTPATIENT
Start: 2022-05-10 | End: 2022-05-11 | Stop reason: SDUPTHER

## 2022-05-11 RX ORDER — CYCLOBENZAPRINE HCL 10 MG
10 TABLET ORAL 3 TIMES DAILY PRN
Qty: 30 TABLET | Refills: 0 | Status: SHIPPED | OUTPATIENT
Start: 2022-05-11

## 2022-06-01 DIAGNOSIS — F33.41 RECURRENT MAJOR DEPRESSIVE DISORDER, IN PARTIAL REMISSION (HCC): ICD-10-CM

## 2022-06-01 DIAGNOSIS — K21.9 GASTROESOPHAGEAL REFLUX DISEASE WITHOUT ESOPHAGITIS: ICD-10-CM

## 2022-06-02 RX ORDER — LANSOPRAZOLE 30 MG/1
30 CAPSULE, DELAYED RELEASE ORAL DAILY
Qty: 90 CAPSULE | Refills: 0 | Status: SHIPPED | OUTPATIENT
Start: 2022-06-02 | End: 2022-08-05 | Stop reason: SDUPTHER

## 2022-06-02 RX ORDER — DULOXETIN HYDROCHLORIDE 60 MG/1
60 CAPSULE, DELAYED RELEASE ORAL DAILY
Qty: 90 CAPSULE | Refills: 0 | Status: SHIPPED | OUTPATIENT
Start: 2022-06-02

## 2022-06-10 ENCOUNTER — OFFICE VISIT (OUTPATIENT)
Dept: GASTROENTEROLOGY | Facility: CLINIC | Age: 47
End: 2022-06-10
Payer: COMMERCIAL

## 2022-06-10 VITALS
HEART RATE: 75 BPM | BODY MASS INDEX: 29.8 KG/M2 | SYSTOLIC BLOOD PRESSURE: 122 MMHG | DIASTOLIC BLOOD PRESSURE: 90 MMHG | WEIGHT: 220 LBS | OXYGEN SATURATION: 96 % | HEIGHT: 72 IN

## 2022-06-10 DIAGNOSIS — Z86.010 HISTORY OF COLON POLYPS: Primary | ICD-10-CM

## 2022-06-10 DIAGNOSIS — Z12.11 SCREENING FOR COLON CANCER: ICD-10-CM

## 2022-06-10 PROCEDURE — 3008F BODY MASS INDEX DOCD: CPT | Performed by: INTERNAL MEDICINE

## 2022-06-10 PROCEDURE — 4004F PT TOBACCO SCREEN RCVD TLK: CPT | Performed by: INTERNAL MEDICINE

## 2022-06-10 PROCEDURE — 99204 OFFICE O/P NEW MOD 45 MIN: CPT | Performed by: INTERNAL MEDICINE

## 2022-06-10 NOTE — PATIENT INSTRUCTIONS
Scheduled date of colonoscopy (as of today):8/23/22  Physician performing colonoscopy:Hans  Location of colonoscopy:[facility]  Bowel prep reviewed with patient:miralax/dulcolax  Instructions reviewed with patient by:Rain MCLEAN  Clearances:  none

## 2022-06-10 NOTE — LETTER
June 13, 2022     Kelechi Olson, [address]    Patient: Nia Park   YOB: 1975   Date of Visit: 6/10/2022       Dear Dr Odell Murphy: Thank you for referring Valdo Cormier to me for evaluation  Below are my notes for this consultation  If you have questions, please do not hesitate to call me  I look forward to following your patient along with you  Sincerely,        Aashish Oscar DO        CC: No Recipients  Aashish Oscar DO  6/10/2022  9:32 AM  Signed  [facility] - Outpatient Consultation  Nia Park 51 y o  male MRN: [id number]  Encounter: [id number]          ASSESSMENT AND PLAN:      1  Screening for colon cancer  - Ambulatory referral to Gastroenterology  - Colonoscopy; Future    2  History of colon polyps    3  Gastroesophageal reflux disease    4  Rectal bleeding, intermittent    5  Bloating    ______________________________________________________________________    HPI:  This 54-year-old male comes the office today for the scheduling of a surveillance colonoscopy based on a history of colon polyps  His last colonoscopy was over 10 years ago  There is no family history for colon cancer or for colon polyp  He states that he does go to the bathroom once daily but there sometimes abdominal pain and occasional bloating  He does have intermittent episodes of rectal bleeding  His last rectal bleeding episode was 1 week ago  He denies nausea vomiting  He does have heartburn symptoms but that is well controlled with lansoprazole which she takes on a daily basis  There is no heartburn  He denies any dysphagia or odynophagia  REVIEW OF SYSTEMS:    CONSTITUTIONAL: Denies any fever, chills, rigors, and weight loss  HEENT: No earache or tinnitus  Denies hearing loss or visual disturbances  CARDIOVASCULAR: No chest pain or palpitations     RESPIRATORY: Denies any cough, hemoptysis, shortness of breath or dyspnea on exertion  GASTROINTESTINAL: As noted in the History of Present Illness  GENITOURINARY: No problems with urination  Denies any hematuria or dysuria  NEUROLOGIC: No dizziness or vertigo, denies headaches  MUSCULOSKELETAL: Denies any muscle or joint pain  SKIN: Denies skin rashes or itching  ENDOCRINE: Denies excessive thirst  Denies intolerance to heat or cold  PSYCHOSOCIAL: Denies depression or anxiety  Denies any recent memory loss         Historical Information   Past Medical History:   Diagnosis Date    AC separation, right, sequela     Anxiety     Chest pain     Chronic arm pain     Chronic back pain     GERD (gastroesophageal reflux disease)     Hyperlipidemia     Vitamin D deficiency      Past Surgical History:   Procedure Laterality Date    NEUROPLASTY / TRANSPOSITION MEDIAN NERVE AT CARPAL TUNNEL      TENDON REPAIR      Extensor     Social History   Social History     Substance and Sexual Activity   Alcohol Use Yes    Alcohol/week: 12 0 standard drinks    Types: 12 Cans of beer per week     Social History     Substance and Sexual Activity   Drug Use Yes    Types: Marijuana     Social History     Tobacco Use   Smoking Status Current Every Day Smoker    Packs/day: 1 50    Years: 25 00    Pack years: 37 50    Types: Cigarettes   Smokeless Tobacco Never Used     Family History   Problem Relation Age of Onset    No Known Problems Mother     Diabetes Father         Mellitus    Diabetes Paternal Uncle        Meds/Allergies       Current Outpatient Medications:     atorvastatin (LIPITOR) 40 mg tablet    chlorhexidine (PERIDEX) 0 12 % solution    Cholecalciferol (Vitamin D3) 50 MCG (2000 UT) TABS    cyclobenzaprine (FLEXERIL) 10 mg tablet    DULoxetine (CYMBALTA) 60 mg delayed release capsule    fenofibrate (TRICOR) 54 MG tablet    lansoprazole (PREVACID) 30 mg capsule    olmesartan (BENICAR) 5 mg tablet    ARIPiprazole (ABILIFY) 5 mg tablet    diclofenac sodium (VOLTAREN) 50 mg EC tablet    ketorolac (TORADOL) 10 mg tablet    pregabalin (LYRICA) 100 mg capsule    Allergies   Allergen Reactions    Other      Annotation - [id number]: Wood Mites           Objective     Blood pressure 122/90, pulse 75, height 6' (1 829 m), weight 99 8 kg (220 lb), SpO2 96 %  Body mass index is 29 84 kg/m²  PHYSICAL EXAM:      General Appearance:   Alert, cooperative, no distress   HEENT:   Normocephalic, atraumatic, anicteric      Neck:  Supple, symmetrical, trachea midline   Lungs:   Clear to auscultation bilaterally; no rales, rhonchi or wheezing; respirations unlabored    Heart[de-identified]   Regular rate and rhythm; no murmur, rub, or gallop  Abdomen:   Soft, non-tender, non-distended; normal bowel sounds; no masses, no organomegaly    Genitalia:   Deferred    Rectal:   Deferred    Extremities:  No cyanosis, clubbing or edema    Pulses:  2+ and symmetric    Skin:  No jaundice, rashes, or lesions    Lymph nodes:  No palpable cervical lymphadenopathy        Lab Results:   No visits with results within 1 Day(s) from this visit     Latest known visit with results is:   Appointment on 02/28/2022   Component Date Value    Cholesterol 02/28/2022 152     Triglycerides 02/28/2022 445 (A)    HDL, Direct 02/28/2022 40     LDL Calculated 02/28/2022      WBC 02/28/2022 11 54 (A)    RBC 02/28/2022 5 07     Hemoglobin 02/28/2022 15 2     Hematocrit 02/28/2022 46 3     MCV 02/28/2022 91     MCH 02/28/2022 30 0     MCHC 02/28/2022 32 8     RDW 02/28/2022 14 0     MPV 02/28/2022 10 0     Platelets 05/19/9832 314     nRBC 02/28/2022 0     Neutrophils Relative 02/28/2022 43     Immat GRANS % 02/28/2022 0     Lymphocytes Relative 02/28/2022 37     Monocytes Relative 02/28/2022 12     Eosinophils Relative 02/28/2022 6     Basophils Relative 02/28/2022 2 (A)    Neutrophils Absolute 02/28/2022 5 05     Immature Grans Absolute 02/28/2022 0 03     Lymphocytes Absolute 02/28/2022 4 22  Monocytes Absolute 02/28/2022 1 42 (A)    Eosinophils Absolute 02/28/2022 0 64 (A)    Basophils Absolute 02/28/2022 0 18 (A)    Sodium 02/28/2022 139     Potassium 02/28/2022 4 1     Chloride 02/28/2022 106     CO2 02/28/2022 28     ANION GAP 02/28/2022 5     BUN 02/28/2022 17     Creatinine 02/28/2022 0 84     Glucose, Fasting 02/28/2022 96     Calcium 02/28/2022 10 0     AST 02/28/2022 20     ALT 02/28/2022 52     Alkaline Phosphatase 02/28/2022 72     Total Protein 02/28/2022 7 8     Albumin 02/28/2022 4 2     Total Bilirubin 02/28/2022 0 35     eGFR 02/28/2022 104     TSH 3RD GENERATON 02/28/2022 3 340     Hepatitis C Ab 02/28/2022 Non-reactive     LDL Direct 02/28/2022 68          Radiology Results:   No results found

## 2022-06-10 NOTE — PROGRESS NOTES
[facility] - Outpatient Consultation  Mine Wade 51 y o  male MRN: [id number]  Encounter: [id number]          ASSESSMENT AND PLAN:      1  Screening for colon cancer  - Ambulatory referral to Gastroenterology  - Colonoscopy; Future    2  History of colon polyps    3  Gastroesophageal reflux disease    4  Rectal bleeding, intermittent    5  Bloating    ______________________________________________________________________    HPI:  This 50-year-old male comes the office today for the scheduling of a surveillance colonoscopy based on a history of colon polyps  His last colonoscopy was over 10 years ago  There is no family history for colon cancer or for colon polyp  He states that he does go to the bathroom once daily but there sometimes abdominal pain and occasional bloating  He does have intermittent episodes of rectal bleeding  His last rectal bleeding episode was 1 week ago  He denies nausea vomiting  He does have heartburn symptoms but that is well controlled with lansoprazole which she takes on a daily basis  There is no heartburn  He denies any dysphagia or odynophagia  REVIEW OF SYSTEMS:    CONSTITUTIONAL: Denies any fever, chills, rigors, and weight loss  HEENT: No earache or tinnitus  Denies hearing loss or visual disturbances  CARDIOVASCULAR: No chest pain or palpitations  RESPIRATORY: Denies any cough, hemoptysis, shortness of breath or dyspnea on exertion  GASTROINTESTINAL: As noted in the History of Present Illness  GENITOURINARY: No problems with urination  Denies any hematuria or dysuria  NEUROLOGIC: No dizziness or vertigo, denies headaches  MUSCULOSKELETAL: Denies any muscle or joint pain  SKIN: Denies skin rashes or itching  ENDOCRINE: Denies excessive thirst  Denies intolerance to heat or cold  PSYCHOSOCIAL: Denies depression or anxiety  Denies any recent memory loss         Historical Information   Past Medical History:   Diagnosis Date    AC separation, right, sequela     Anxiety     Chest pain     Chronic arm pain     Chronic back pain     GERD (gastroesophageal reflux disease)     Hyperlipidemia     Vitamin D deficiency      Past Surgical History:   Procedure Laterality Date    NEUROPLASTY / TRANSPOSITION MEDIAN NERVE AT CARPAL TUNNEL      TENDON REPAIR      Extensor     Social History   Social History     Substance and Sexual Activity   Alcohol Use Yes    Alcohol/week: 12 0 standard drinks    Types: 12 Cans of beer per week     Social History     Substance and Sexual Activity   Drug Use Yes    Types: Marijuana     Social History     Tobacco Use   Smoking Status Current Every Day Smoker    Packs/day: 1 50    Years: 25 00    Pack years: 37 50    Types: Cigarettes   Smokeless Tobacco Never Used     Family History   Problem Relation Age of Onset    No Known Problems Mother     Diabetes Father         Mellitus    Diabetes Paternal Uncle        Meds/Allergies       Current Outpatient Medications:     atorvastatin (LIPITOR) 40 mg tablet    chlorhexidine (PERIDEX) 0 12 % solution    Cholecalciferol (Vitamin D3) 50 MCG (2000 UT) TABS    cyclobenzaprine (FLEXERIL) 10 mg tablet    DULoxetine (CYMBALTA) 60 mg delayed release capsule    fenofibrate (TRICOR) 54 MG tablet    lansoprazole (PREVACID) 30 mg capsule    olmesartan (BENICAR) 5 mg tablet    ARIPiprazole (ABILIFY) 5 mg tablet    diclofenac sodium (VOLTAREN) 50 mg EC tablet    ketorolac (TORADOL) 10 mg tablet    pregabalin (LYRICA) 100 mg capsule    Allergies   Allergen Reactions    Other      Annotation - [id number]: Wood Mites           Objective     Blood pressure 122/90, pulse 75, height 6' (1 829 m), weight 99 8 kg (220 lb), SpO2 96 %  Body mass index is 29 84 kg/m²          PHYSICAL EXAM:      General Appearance:   Alert, cooperative, no distress   HEENT:   Normocephalic, atraumatic, anicteric      Neck:  Supple, symmetrical, trachea midline   Lungs:   Clear to auscultation bilaterally; no rales, rhonchi or wheezing; respirations unlabored    Heart[de-identified]   Regular rate and rhythm; no murmur, rub, or gallop  Abdomen:   Soft, non-tender, non-distended; normal bowel sounds; no masses, no organomegaly    Genitalia:   Deferred    Rectal:   Deferred    Extremities:  No cyanosis, clubbing or edema    Pulses:  2+ and symmetric    Skin:  No jaundice, rashes, or lesions    Lymph nodes:  No palpable cervical lymphadenopathy        Lab Results:   No visits with results within 1 Day(s) from this visit     Latest known visit with results is:   Appointment on 02/28/2022   Component Date Value    Cholesterol 02/28/2022 152     Triglycerides 02/28/2022 445 (A)    HDL, Direct 02/28/2022 40     LDL Calculated 02/28/2022      WBC 02/28/2022 11 54 (A)    RBC 02/28/2022 5 07     Hemoglobin 02/28/2022 15 2     Hematocrit 02/28/2022 46 3     MCV 02/28/2022 91     MCH 02/28/2022 30 0     MCHC 02/28/2022 32 8     RDW 02/28/2022 14 0     MPV 02/28/2022 10 0     Platelets 55/19/8742 314     nRBC 02/28/2022 0     Neutrophils Relative 02/28/2022 43     Immat GRANS % 02/28/2022 0     Lymphocytes Relative 02/28/2022 37     Monocytes Relative 02/28/2022 12     Eosinophils Relative 02/28/2022 6     Basophils Relative 02/28/2022 2 (A)    Neutrophils Absolute 02/28/2022 5 05     Immature Grans Absolute 02/28/2022 0 03     Lymphocytes Absolute 02/28/2022 4 22     Monocytes Absolute 02/28/2022 1 42 (A)    Eosinophils Absolute 02/28/2022 0 64 (A)    Basophils Absolute 02/28/2022 0 18 (A)    Sodium 02/28/2022 139     Potassium 02/28/2022 4 1     Chloride 02/28/2022 106     CO2 02/28/2022 28     ANION GAP 02/28/2022 5     BUN 02/28/2022 17     Creatinine 02/28/2022 0 84     Glucose, Fasting 02/28/2022 96     Calcium 02/28/2022 10 0     AST 02/28/2022 20     ALT 02/28/2022 52     Alkaline Phosphatase 02/28/2022 72     Total Protein 02/28/2022 7 8     Albumin 02/28/2022 4 2  Total Bilirubin 02/28/2022 0 35     eGFR 02/28/2022 104     TSH 3RD GENERATON 02/28/2022 3 340     Hepatitis C Ab 02/28/2022 Non-reactive     LDL Direct 02/28/2022 68          Radiology Results:   No results found

## 2022-07-18 ENCOUNTER — APPOINTMENT (OUTPATIENT)
Dept: RADIOLOGY | Facility: CLINIC | Age: 47
End: 2022-07-18
Payer: COMMERCIAL

## 2022-07-18 ENCOUNTER — OFFICE VISIT (OUTPATIENT)
Dept: URGENT CARE | Facility: CLINIC | Age: 47
End: 2022-07-18
Payer: COMMERCIAL

## 2022-07-18 VITALS
HEART RATE: 78 BPM | TEMPERATURE: 97.8 F | OXYGEN SATURATION: 97 % | BODY MASS INDEX: 30.92 KG/M2 | DIASTOLIC BLOOD PRESSURE: 80 MMHG | SYSTOLIC BLOOD PRESSURE: 131 MMHG | WEIGHT: 228 LBS | RESPIRATION RATE: 18 BRPM

## 2022-07-18 DIAGNOSIS — S96.912A MUSCLE STRAIN OF LEFT FOOT, INITIAL ENCOUNTER: Primary | ICD-10-CM

## 2022-07-18 DIAGNOSIS — S96.912A MUSCLE STRAIN OF LEFT FOOT, INITIAL ENCOUNTER: ICD-10-CM

## 2022-07-18 PROCEDURE — 99213 OFFICE O/P EST LOW 20 MIN: CPT | Performed by: PHYSICIAN ASSISTANT

## 2022-07-18 PROCEDURE — 73630 X-RAY EXAM OF FOOT: CPT

## 2022-07-18 RX ORDER — ASPIRIN 81 MG/1
81 TABLET, CHEWABLE ORAL DAILY
COMMUNITY

## 2022-07-18 NOTE — LETTER
July 18, 2022     Patient: Fiona Alejo   YOB: 1975   Date of Visit: 7/18/2022       To Whom It May Concern: It is my medical opinion that Mayco Linares should remain out of work until 07/21/2022  If you have any questions or concerns, please don't hesitate to call           Sincerely,        Jose Schwartz PA-C    CC: Referral Self  KEVIN Kaminski

## 2022-07-18 NOTE — PROGRESS NOTES
[facility]        NAME: Karey Geller is a 51 y o  male  : 1975    MRN: [id number]  DATE: 2022  TIME: 2:38 PM    Assessment and Plan   Muscle strain of left foot, initial encounter [S9 912A]  1  Muscle strain of left foot, initial encounter  XR foot 3+ vw left         Patient Instructions       Follow up with PCP in 3-5 days  Proceed to  ER if symptoms worsen  Chief Complaint   No chief complaint on file  History of Present Illness       Patient is a 50 y/o/m presenting [facility] with left foot pain, swelling and redness  Pt reports noticing this about 2 weeks ago while putting on his shoe  Pt reports feeling as if something stung him  Did not see any insect  Patient reports redness/swelling each day since that time  No numbness or tingling or fevers  Pain is worse with ambulation  No significant previous injuries to extremity  Review of Systems   Review of Systems   Constitutional: Negative for chills and fever  HENT: Negative for ear pain and sore throat  Eyes: Negative for pain and visual disturbance  Respiratory: Negative for cough and shortness of breath  Cardiovascular: Negative for chest pain and palpitations  Gastrointestinal: Negative for abdominal pain and vomiting  Genitourinary: Negative for dysuria and hematuria  Musculoskeletal: Positive for arthralgias (Left foot pain, redness and swelling)  Negative for back pain  Skin: Negative for color change and rash  Neurological: Negative for seizures and syncope  All other systems reviewed and are negative          Current Medications       Current Outpatient Medications:     aspirin 81 mg chewable tablet, Chew 81 mg daily, Disp: , Rfl:     Cholecalciferol (Vitamin D3) 50 MCG (2000 UT) TABS, Take 1 tablet (2,000 Units total) by mouth daily, Disp: 90 tablet, Rfl: 3    DULoxetine (CYMBALTA) 60 mg delayed release capsule, Take 1 capsule (60 mg total) by mouth daily, Disp: 90 capsule, Rfl: 0    fenofibrate (TRICOR) 54 MG tablet, Take 1 tablet (54 mg total) by mouth daily, Disp: 90 tablet, Rfl: 1    lansoprazole (PREVACID) 30 mg capsule, Take 1 capsule (30 mg total) by mouth daily, Disp: 90 capsule, Rfl: 0    olmesartan (BENICAR) 5 mg tablet, Take 1 tablet (5 mg total) by mouth in the morning , Disp: 90 tablet, Rfl: 1    ARIPiprazole (ABILIFY) 5 mg tablet, Take 1 tablet (5 mg total) by mouth daily (Patient not taking: Reported on 2/3/2021), Disp: 30 tablet, Rfl: 0    atorvastatin (LIPITOR) 40 mg tablet, Take 1 tablet (40 mg total) by mouth daily (Patient not taking: Reported on 7/18/2022), Disp: 90 tablet, Rfl: 3    chlorhexidine (PERIDEX) 0 12 % solution, Apply 15 mL to the mouth or throat 2 (two) times a day (Patient not taking: Reported on 7/18/2022), Disp: 473 mL, Rfl: 0    cyclobenzaprine (FLEXERIL) 10 mg tablet, Take 1 tablet (10 mg total) by mouth as needed in the morning and 1 tablet (10 mg total) as needed at noon and 1 tablet (10 mg total) as needed in the evening for muscle spasms   (Patient not taking: Reported on 7/18/2022), Disp: 30 tablet, Rfl: 0    diclofenac sodium (VOLTAREN) 50 mg EC tablet, Take 1 tablet (50 mg total) by mouth 3 (three) times a day (Patient not taking: Reported on 1/3/2021), Disp: 90 tablet, Rfl: 1    ketorolac (TORADOL) 10 mg tablet, Take 1 tablet (10 mg total) by mouth every 6 (six) hours as needed for moderate pain for up to 5 days (Patient not taking: Reported on 2/1/2021), Disp: 20 tablet, Rfl: 0    pregabalin (LYRICA) 100 mg capsule, Take 1 capsule (100 mg total) by mouth 3 (three) times a day, Disp: 90 capsule, Rfl: 1    Current Allergies     Allergies as of 07/18/2022 - Reviewed 07/18/2022   Allergen Reaction Noted    Other  06/23/2014            The following portions of the patient's history were reviewed and updated as appropriate: allergies, current medications, past family history, past medical history, past social history, past surgical history and problem list      Past Medical History:   Diagnosis Date    AC separation, right, sequela     Anxiety     Chest pain     Chronic arm pain     Chronic back pain     GERD (gastroesophageal reflux disease)     Hyperlipidemia     Vitamin D deficiency        Past Surgical History:   Procedure Laterality Date    NEUROPLASTY / TRANSPOSITION MEDIAN NERVE AT CARPAL TUNNEL      TENDON REPAIR      Extensor       Family History   Problem Relation Age of Onset    No Known Problems Mother     Diabetes Father         Mellitus    Diabetes Paternal Uncle          Medications have been verified  Objective   /80   Pulse 78   Temp 97 8 °F (36 6 °C)   Resp 18   Wt 103 kg (228 lb)   SpO2 (!) 77%   BMI 30 92 kg/m²   No LMP for male patient  Physical Exam     Physical Exam  Constitutional:       Appearance: Normal appearance  He is normal weight  HENT:      Head: Normocephalic and atraumatic  Nose: Nose normal       Mouth/Throat:      Mouth: Mucous membranes are moist    Eyes:      Extraocular Movements: Extraocular movements intact  Conjunctiva/sclera: Conjunctivae normal       Pupils: Pupils are equal, round, and reactive to light  Cardiovascular:      Rate and Rhythm: Normal rate  Pulmonary:      Effort: Pulmonary effort is normal    Musculoskeletal:         General: Normal range of motion  Cervical back: Normal range of motion and neck supple  Feet:    Skin:     General: Skin is warm and dry  Neurological:      General: No focal deficit present  Mental Status: He is alert and oriented to person, place, and time     Psychiatric:         Mood and Affect: Mood normal          Behavior: Behavior normal

## 2022-08-05 DIAGNOSIS — E55.9 VITAMIN D DEFICIENCY: ICD-10-CM

## 2022-08-05 DIAGNOSIS — K21.9 GASTROESOPHAGEAL REFLUX DISEASE WITHOUT ESOPHAGITIS: ICD-10-CM

## 2022-08-05 DIAGNOSIS — I10 ESSENTIAL HYPERTENSION: ICD-10-CM

## 2022-08-08 RX ORDER — OLMESARTAN MEDOXOMIL 5 MG/1
5 TABLET ORAL DAILY
Qty: 90 TABLET | Refills: 0 | Status: SHIPPED | OUTPATIENT
Start: 2022-08-08

## 2022-08-08 RX ORDER — CHOLECALCIFEROL (VITAMIN D3) 125 MCG
2000 CAPSULE ORAL DAILY
Qty: 90 TABLET | Refills: 0 | Status: SHIPPED | OUTPATIENT
Start: 2022-08-08

## 2022-08-08 RX ORDER — LANSOPRAZOLE 30 MG/1
30 CAPSULE, DELAYED RELEASE ORAL DAILY
Qty: 90 CAPSULE | Refills: 0 | Status: SHIPPED | OUTPATIENT
Start: 2022-08-08

## 2022-08-23 ENCOUNTER — ANESTHESIA (OUTPATIENT)
Dept: GASTROENTEROLOGY | Facility: HOSPITAL | Age: 47
End: 2022-08-23

## 2022-08-23 ENCOUNTER — HOSPITAL ENCOUNTER (OUTPATIENT)
Dept: GASTROENTEROLOGY | Facility: HOSPITAL | Age: 47
Setting detail: OUTPATIENT SURGERY
Discharge: HOME | End: 2022-08-23
Attending: INTERNAL MEDICINE
Payer: COMMERCIAL

## 2022-08-23 ENCOUNTER — ANESTHESIA EVENT (OUTPATIENT)
Dept: GASTROENTEROLOGY | Facility: HOSPITAL | Age: 47
End: 2022-08-23

## 2022-08-23 VITALS
HEART RATE: 62 BPM | DIASTOLIC BLOOD PRESSURE: 61 MMHG | HEIGHT: 72 IN | SYSTOLIC BLOOD PRESSURE: 128 MMHG | BODY MASS INDEX: 30.04 KG/M2 | RESPIRATION RATE: 15 BRPM | OXYGEN SATURATION: 99 % | TEMPERATURE: 97.7 F | WEIGHT: 221.78 LBS

## 2022-08-23 DIAGNOSIS — Z12.11 SCREENING FOR COLON CANCER: ICD-10-CM

## 2022-08-23 PROCEDURE — G0121 COLON CA SCRN NOT HI RSK IND: HCPCS | Performed by: INTERNAL MEDICINE

## 2022-08-23 RX ORDER — SODIUM CHLORIDE, SODIUM LACTATE, POTASSIUM CHLORIDE, CALCIUM CHLORIDE 600; 310; 30; 20 MG/100ML; MG/100ML; MG/100ML; MG/100ML
100 INJECTION, SOLUTION INTRAVENOUS CONTINUOUS
Status: DISCONTINUED | OUTPATIENT
Start: 2022-08-23 | End: 2022-08-27 | Stop reason: HOSPADM

## 2022-08-23 RX ORDER — SODIUM CHLORIDE, SODIUM LACTATE, POTASSIUM CHLORIDE, CALCIUM CHLORIDE 600; 310; 30; 20 MG/100ML; MG/100ML; MG/100ML; MG/100ML
100 INJECTION, SOLUTION INTRAVENOUS CONTINUOUS
Status: CANCELLED | OUTPATIENT
Start: 2022-08-23

## 2022-08-23 RX ORDER — SODIUM CHLORIDE, SODIUM LACTATE, POTASSIUM CHLORIDE, CALCIUM CHLORIDE 600; 310; 30; 20 MG/100ML; MG/100ML; MG/100ML; MG/100ML
INJECTION, SOLUTION INTRAVENOUS CONTINUOUS PRN
Status: DISCONTINUED | OUTPATIENT
Start: 2022-08-23 | End: 2022-08-23

## 2022-08-23 RX ORDER — PROPOFOL 10 MG/ML
INJECTION, EMULSION INTRAVENOUS AS NEEDED
Status: DISCONTINUED | OUTPATIENT
Start: 2022-08-23 | End: 2022-08-23

## 2022-08-23 RX ADMIN — SODIUM CHLORIDE, SODIUM LACTATE, POTASSIUM CHLORIDE, AND CALCIUM CHLORIDE 100 ML/HR: .6; .31; .03; .02 INJECTION, SOLUTION INTRAVENOUS at 06:54

## 2022-08-23 RX ADMIN — PROPOFOL 100 MG: 10 INJECTION, EMULSION INTRAVENOUS at 07:23

## 2022-08-23 RX ADMIN — PROPOFOL 100 MG: 10 INJECTION, EMULSION INTRAVENOUS at 07:25

## 2022-08-23 RX ADMIN — PROPOFOL 100 MG: 10 INJECTION, EMULSION INTRAVENOUS at 07:21

## 2022-08-23 RX ADMIN — SODIUM CHLORIDE, SODIUM LACTATE, POTASSIUM CHLORIDE, AND CALCIUM CHLORIDE: .6; .31; .03; .02 INJECTION, SOLUTION INTRAVENOUS at 07:16

## 2022-08-23 RX ADMIN — PROPOFOL 150 MG: 10 INJECTION, EMULSION INTRAVENOUS at 07:20

## 2022-08-23 NOTE — ANESTHESIA PREPROCEDURE EVALUATION
Medical History    History Comments   Hyperlipidemia    Chronic back pain    Chronic arm pain    GERD (gastroesophageal reflux disease)    Vitamin D deficiency    Chest pain    Anxiety    AC separation, right, sequela    Hypertension    Colon polyp      Procedure:  COLONOSCOPY    Relevant Problems   ANESTHESIA (within normal limits)      CARDIO   (+) Essential hypertension   (+) Essential hypertriglyceridemia      GI/HEPATIC   (+) GERD (gastroesophageal reflux disease)      MUSCULOSKELETAL   (+) Chronic midline low back pain without sciatica      NEURO/PSYCH   (+) Acute tension-type headache   (+) Anxiety and depression   (+) Chronic midline low back pain without sciatica   (+) Chronic pain syndrome   (+) Persistent depressive disorder      PULMONARY   (+) Smoking        Physical Exam    Airway    Mallampati score: III  TM Distance: <3 FB  Neck ROM: full     Dental       Cardiovascular  Rate: normal,     Pulmonary  Pulmonary exam normal     Other Findings  Per pt denies anything remaining that is loose or removeable      Anesthesia Plan  ASA Score- 2     Anesthesia Type- IV sedation with anesthesia with ASA Monitors  Additional Monitors:   Airway Plan:     Comment: Per patient, appropriately NPO, denies active CP/SOB/wheezing/symptoms related to heartburn/nausea/vomiting  Plan Factors-Exercise tolerance (METS): >4 METS  Chart reviewed  Patient summary reviewed  Patient is a current smoker  Induction- intravenous  Postoperative Plan-     Informed Consent- Anesthetic plan and risks discussed with patient  I personally reviewed this patient with the CRNA  Discussed and agreed on the Anesthesia Plan with the CRNA  Guy Wells

## 2022-08-23 NOTE — H&P
History and Physical - [facility]  Sudhakar Corado 51 y o  male MRN: [id number]      HPI: Sudhakar Corado is a 51y o  year old male who presents for evaluation of rectal bleed and history of colon polyps      REVIEW OF SYSTEMS: Per the HPI, and otherwise unremarkable  Historical Information   Past Medical History:   Diagnosis Date    AC separation, right, sequela     Anxiety     Chest pain     Chronic arm pain     Chronic back pain     Colon polyp     GERD (gastroesophageal reflux disease)     Hyperlipidemia     Hypertension     Vitamin D deficiency      Past Surgical History:   Procedure Laterality Date    CARPAL TUNNEL RELEASE      COLONOSCOPY      NEUROPLASTY / TRANSPOSITION MEDIAN NERVE AT CARPAL TUNNEL      TENDON REPAIR      Extensor     Social History   Social History     Substance and Sexual Activity   Alcohol Use Yes    Alcohol/week: 36 0 standard drinks    Types: 36 Cans of beer per week    Comment: 6 beers a day     Social History     Substance and Sexual Activity   Drug Use Yes    Types: Marijuana    Comment: Medical marijuana     Social History     Tobacco Use   Smoking Status Current Every Day Smoker    Packs/day: 1 50    Years: 25 00    Pack years: 37 50    Types: Cigarettes   Smokeless Tobacco Never Used     Family History   Problem Relation Age of Onset    No Known Problems Mother     Diabetes Father         Mellitus    Diabetes Paternal Uncle        Meds/Allergies     (Not in a hospital admission)      Allergies   Allergen Reactions    Other      Annotation - [id number]: Wood Mites       Objective     Blood pressure 138/85, pulse 70, temperature (!) 97 °F (36 1 °C), temperature source Temporal, resp  rate 16, height 6' (1 829 m), weight 101 kg (221 lb 12 5 oz), SpO2 97 %        PHYSICAL EXAM    Gen: NAD  CV: RRR  CHEST: Clear  ABD: soft, NT/ND  EXT: no edema      ASSESSMENT/PLAN:  This is a 51y o  year old male here for colonoscopy, and he is stable and optimized for his procedure

## 2022-08-23 NOTE — ANESTHESIA POSTPROCEDURE EVALUATION
Post-Op Assessment Note    CV Status:  Stable  Pain Score: 0    Pain management: adequate     Hydration Status:  Stable   PONV Controlled:  Controlled   Airway Patency:  Patent      Post Op Vitals Reviewed: Yes      Staff: Anesthesiologist, CRNA         No complications documented      BP      Temp     Pulse     Resp      SpO2

## 2022-08-28 DIAGNOSIS — E78.1 ESSENTIAL HYPERTRIGLYCERIDEMIA: ICD-10-CM

## 2022-08-28 RX ORDER — FENOFIBRATE 54 MG/1
TABLET ORAL
Qty: 90 TABLET | Refills: 1 | Status: SHIPPED | OUTPATIENT
Start: 2022-08-28

## 2022-09-09 DIAGNOSIS — F33.41 RECURRENT MAJOR DEPRESSIVE DISORDER, IN PARTIAL REMISSION (HCC): ICD-10-CM

## 2022-09-09 RX ORDER — DULOXETIN HYDROCHLORIDE 60 MG/1
60 CAPSULE, DELAYED RELEASE ORAL DAILY
Qty: 90 CAPSULE | Refills: 1 | Status: SHIPPED | OUTPATIENT
Start: 2022-09-09 | End: 2023-03-30

## 2022-11-22 DIAGNOSIS — E78.1 ESSENTIAL HYPERTRIGLYCERIDEMIA: Primary | ICD-10-CM

## 2022-11-22 DIAGNOSIS — Z13.1 SCREENING FOR DIABETES MELLITUS: ICD-10-CM

## 2022-11-22 DIAGNOSIS — Z13.29 SCREENING FOR THYROID DISORDER: ICD-10-CM

## 2022-11-22 DIAGNOSIS — Z13.0 SCREENING FOR DEFICIENCY ANEMIA: ICD-10-CM

## 2023-02-12 DIAGNOSIS — K21.9 GASTROESOPHAGEAL REFLUX DISEASE WITHOUT ESOPHAGITIS: ICD-10-CM

## 2023-02-12 RX ORDER — LANSOPRAZOLE 30 MG/1
CAPSULE, DELAYED RELEASE ORAL
Qty: 90 CAPSULE | Refills: 1 | Status: SHIPPED | OUTPATIENT
Start: 2023-02-12

## 2023-02-15 ENCOUNTER — TELEPHONE (OUTPATIENT)
Dept: FAMILY MEDICINE CLINIC | Facility: CLINIC | Age: 48
End: 2023-02-15

## 2023-03-21 DIAGNOSIS — I10 ESSENTIAL HYPERTENSION: ICD-10-CM

## 2023-03-21 DIAGNOSIS — E78.1 ESSENTIAL HYPERTRIGLYCERIDEMIA: ICD-10-CM

## 2023-03-21 RX ORDER — OLMESARTAN MEDOXOMIL 5 MG/1
5 TABLET ORAL DAILY
Qty: 90 TABLET | Refills: 1 | Status: SHIPPED | OUTPATIENT
Start: 2023-03-21

## 2023-03-21 RX ORDER — FENOFIBRATE 54 MG/1
54 TABLET ORAL DAILY
Qty: 90 TABLET | Refills: 1 | Status: SHIPPED | OUTPATIENT
Start: 2023-03-21

## 2023-03-30 DIAGNOSIS — F33.41 RECURRENT MAJOR DEPRESSIVE DISORDER, IN PARTIAL REMISSION (HCC): ICD-10-CM

## 2023-03-30 RX ORDER — DULOXETIN HYDROCHLORIDE 60 MG/1
CAPSULE, DELAYED RELEASE ORAL
Qty: 90 CAPSULE | Refills: 1 | Status: SHIPPED | OUTPATIENT
Start: 2023-03-30

## 2023-08-12 DIAGNOSIS — K21.9 GASTROESOPHAGEAL REFLUX DISEASE WITHOUT ESOPHAGITIS: ICD-10-CM

## 2023-08-12 RX ORDER — LANSOPRAZOLE 30 MG/1
CAPSULE, DELAYED RELEASE ORAL
Qty: 90 CAPSULE | Refills: 0 | Status: SHIPPED | OUTPATIENT
Start: 2023-08-12

## 2023-10-02 DIAGNOSIS — Z13.220 SCREENING FOR LIPID DISORDERS: ICD-10-CM

## 2023-10-02 DIAGNOSIS — Z13.29 SCREENING FOR THYROID DISORDER: ICD-10-CM

## 2023-10-02 DIAGNOSIS — Z13.1 SCREENING FOR DIABETES MELLITUS: ICD-10-CM

## 2023-10-02 DIAGNOSIS — Z13.0 SCREENING FOR DEFICIENCY ANEMIA: Primary | ICD-10-CM

## 2023-10-12 DIAGNOSIS — I10 ESSENTIAL HYPERTENSION: ICD-10-CM

## 2023-10-13 RX ORDER — OLMESARTAN MEDOXOMIL 5 MG/1
5 TABLET ORAL DAILY
Qty: 90 TABLET | Refills: 1 | Status: SHIPPED | OUTPATIENT
Start: 2023-10-13

## 2023-10-16 ENCOUNTER — TELEPHONE (OUTPATIENT)
Dept: FAMILY MEDICINE CLINIC | Facility: CLINIC | Age: 48
End: 2023-10-16

## 2023-10-16 DIAGNOSIS — F33.41 RECURRENT MAJOR DEPRESSIVE DISORDER, IN PARTIAL REMISSION (HCC): ICD-10-CM

## 2023-10-16 RX ORDER — DULOXETIN HYDROCHLORIDE 60 MG/1
60 CAPSULE, DELAYED RELEASE ORAL DAILY
Qty: 30 CAPSULE | Refills: 0 | Status: SHIPPED | OUTPATIENT
Start: 2023-10-16 | End: 2023-11-16 | Stop reason: SDUPTHER

## 2023-10-16 NOTE — TELEPHONE ENCOUNTER
I called pt because he has an tyrel tomorrow, and he did not get his bw done. Pt states he does not have insurance, he wants to keep tomorrows tyrel and pay out of packet because he needs his DULoxetine (CYMBALTA) 60 mg. Can he be seen tomorrow?

## 2023-11-10 ENCOUNTER — OFFICE VISIT (OUTPATIENT)
Dept: URGENT CARE | Facility: CLINIC | Age: 48
End: 2023-11-10

## 2023-11-10 VITALS — HEART RATE: 81 BPM | OXYGEN SATURATION: 96 % | RESPIRATION RATE: 18 BRPM | TEMPERATURE: 99.1 F

## 2023-11-10 DIAGNOSIS — K05.219 PERIODONTAL ABSCESS: Primary | ICD-10-CM

## 2023-11-10 PROCEDURE — 99213 OFFICE O/P EST LOW 20 MIN: CPT | Performed by: PHYSICIAN ASSISTANT

## 2023-11-10 RX ORDER — AMOXICILLIN 500 MG/1
500 CAPSULE ORAL EVERY 8 HOURS SCHEDULED
Qty: 21 CAPSULE | Refills: 0 | Status: SHIPPED | OUTPATIENT
Start: 2023-11-10 | End: 2023-11-17

## 2023-11-10 NOTE — PATIENT INSTRUCTIONS
Periodontal abscess  Amoxicillin as directed  Follow up with PCP in 3-5 days. Proceed to  ER if symptoms worsen.

## 2023-11-10 NOTE — PROGRESS NOTES
[facility]        NAME: Kaylie Resendiz is a 51 y.o. male  : 1975    MRN: [id number]  DATE: November 10, 2023  TIME: 1:20 PM    Assessment and Plan   Periodontal abscess [K05.219]  1. Periodontal abscess  amoxicillin (AMOXIL) 500 mg capsule            Patient Instructions     Periodontal abscess  Amoxicillin as directed  Follow up with PCP in 3-5 days. Proceed to  ER if symptoms worsen. Chief Complaint     Chief Complaint   Patient presents with   • Dental Pain     Patient here with swelling and pain on right side of mouth. Patient states its been ongoing for 6 days now. Patient states it is worsening; difficult to eat and drink, and it also is causing headaches. Patient states he is taking amoxicillin since Monday evening that he had at home but it doesn't seem to be helping. History of Present Illness       48-year-old male who presents complaining of pain to gums, caries, swelling of face x3 days. Denies fevers, chills, nausea, vomiting. Dental Pain       Review of Systems   Review of Systems   Constitutional: Negative. HENT:  Positive for dental problem and facial swelling. Eyes: Negative. Respiratory: Negative. Negative for apnea, cough, choking, chest tightness, shortness of breath, wheezing and stridor. Cardiovascular: Negative. Negative for chest pain.          Current Medications       Current Outpatient Medications:   •  amoxicillin (AMOXIL) 500 mg capsule, Take 1 capsule (500 mg total) by mouth every 8 (eight) hours for 7 days, Disp: 21 capsule, Rfl: 0  •  aspirin 81 mg chewable tablet, Chew 81 mg daily, Disp: , Rfl:   •  Cholecalciferol (Vitamin D3) 50 MCG (2000 UT) TABS, Take 1 tablet (2,000 Units total) by mouth daily, Disp: 90 tablet, Rfl: 0  •  DULoxetine (CYMBALTA) 60 mg delayed release capsule, Take 1 capsule (60 mg total) by mouth daily, Disp: 30 capsule, Rfl: 0  •  fenofibrate (TRICOR) 54 MG tablet, take 1 take by mouth daily, Disp: 30 tablet, Rfl: 0  •  lansoprazole (PREVACID) 30 mg capsule, take 1 capsule by mouth once daily, Disp: 90 capsule, Rfl: 0  •  olmesartan (BENICAR) 5 mg tablet, take 1 tablet by mouth once daily, Disp: 90 tablet, Rfl: 1    Current Allergies     Allergies as of 11/10/2023 - Reviewed 11/10/2023   Allergen Reaction Noted   • Other  06/23/2014            The following portions of the patient's history were reviewed and updated as appropriate: allergies, current medications, past family history, past medical history, past social history, past surgical history and problem list.     Past Medical History:   Diagnosis Date   • AC separation, right, sequela    • Anxiety    • Chest pain    • Chronic arm pain    • Chronic back pain    • Colon polyp    • GERD (gastroesophageal reflux disease)    • Hyperlipidemia    • Hypertension    • Vitamin D deficiency        Past Surgical History:   Procedure Laterality Date   • CARPAL TUNNEL RELEASE     • COLONOSCOPY     • NEUROPLASTY / TRANSPOSITION MEDIAN NERVE AT CARPAL TUNNEL     • TENDON REPAIR      Extensor       Family History   Problem Relation Age of Onset   • No Known Problems Mother    • Diabetes Father         Mellitus   • Diabetes Paternal Uncle          Medications have been verified. Objective   Pulse 81   Temp 99.1 °F (37.3 °C)   Resp 18   SpO2 96%        Physical Exam     Physical Exam  Constitutional:       General: He is not in acute distress. Appearance: Normal appearance. He is well-developed. He is not diaphoretic. HENT:      Head: Normocephalic and atraumatic. Mouth/Throat:      Lips: Pink. Mouth: Mucous membranes are moist.      Dentition: Dental tenderness, dental caries and dental abscesses present. Pharynx: Oropharynx is clear. Cardiovascular:      Rate and Rhythm: Normal rate and regular rhythm. Heart sounds: Normal heart sounds. Pulmonary:      Effort: Pulmonary effort is normal. No respiratory distress.       Breath sounds: Normal breath sounds. No stridor. No wheezing, rhonchi or rales. Chest:      Chest wall: No tenderness. Musculoskeletal:      Cervical back: Normal range of motion and neck supple. Lymphadenopathy:      Cervical: No cervical adenopathy. Neurological:      Mental Status: He is alert.

## 2023-11-16 DIAGNOSIS — F33.41 RECURRENT MAJOR DEPRESSIVE DISORDER, IN PARTIAL REMISSION (HCC): ICD-10-CM

## 2023-11-16 RX ORDER — DULOXETIN HYDROCHLORIDE 60 MG/1
60 CAPSULE, DELAYED RELEASE ORAL DAILY
Qty: 30 CAPSULE | Refills: 0 | Status: SHIPPED | OUTPATIENT
Start: 2023-11-16

## 2023-11-16 NOTE — TELEPHONE ENCOUNTER
His insurance will start on December 10th and he can't go that long with out the medication is there anything he can do before that. He needs to have this medication he doesn't think he can go that long without it.  He scheduled his appointment for Dec. 12th and will get the BW done on the 11th

## 2023-11-19 DIAGNOSIS — E78.1 ESSENTIAL HYPERTRIGLYCERIDEMIA: ICD-10-CM

## 2023-11-20 RX ORDER — FENOFIBRATE 54 MG/1
TABLET ORAL
Qty: 30 TABLET | Refills: 0 | Status: SHIPPED | OUTPATIENT
Start: 2023-11-20

## 2023-11-28 DIAGNOSIS — I10 ESSENTIAL HYPERTENSION: ICD-10-CM

## 2023-11-28 DIAGNOSIS — K21.9 GASTROESOPHAGEAL REFLUX DISEASE WITHOUT ESOPHAGITIS: ICD-10-CM

## 2023-12-08 RX ORDER — LANSOPRAZOLE 30 MG/1
30 CAPSULE, DELAYED RELEASE ORAL DAILY
Qty: 90 CAPSULE | Refills: 0 | Status: CANCELLED | OUTPATIENT
Start: 2023-12-08

## 2023-12-11 ENCOUNTER — APPOINTMENT (OUTPATIENT)
Dept: LAB | Facility: CLINIC | Age: 48
End: 2023-12-11
Payer: COMMERCIAL

## 2023-12-11 DIAGNOSIS — Z13.1 SCREENING FOR DIABETES MELLITUS: ICD-10-CM

## 2023-12-11 DIAGNOSIS — Z13.0 SCREENING FOR DEFICIENCY ANEMIA: ICD-10-CM

## 2023-12-11 DIAGNOSIS — Z13.29 SCREENING FOR THYROID DISORDER: ICD-10-CM

## 2023-12-11 DIAGNOSIS — Z13.220 SCREENING FOR LIPID DISORDERS: ICD-10-CM

## 2023-12-11 LAB
ALBUMIN SERPL BCP-MCNC: 4.3 G/DL (ref 3.5–5)
ALP SERPL-CCNC: 55 U/L (ref 34–104)
ALT SERPL W P-5'-P-CCNC: 57 U/L (ref 7–52)
ANION GAP SERPL CALCULATED.3IONS-SCNC: 10 MMOL/L
AST SERPL W P-5'-P-CCNC: 27 U/L (ref 13–39)
BASOPHILS # BLD MANUAL: 0.23 THOUSAND/UL (ref 0–0.1)
BASOPHILS NFR MAR MANUAL: 2 % (ref 0–1)
BILIRUB SERPL-MCNC: 0.25 MG/DL (ref 0.2–1)
BUN SERPL-MCNC: 12 MG/DL (ref 5–25)
CALCIUM SERPL-MCNC: 9.1 MG/DL (ref 8.4–10.2)
CHLORIDE SERPL-SCNC: 107 MMOL/L (ref 96–108)
CHOLEST SERPL-MCNC: 157 MG/DL
CO2 SERPL-SCNC: 25 MMOL/L (ref 21–32)
CREAT SERPL-MCNC: 0.48 MG/DL (ref 0.6–1.3)
EOSINOPHIL # BLD MANUAL: 0.35 THOUSAND/UL (ref 0–0.4)
EOSINOPHIL NFR BLD MANUAL: 3 % (ref 0–6)
ERYTHROCYTE [DISTWIDTH] IN BLOOD BY AUTOMATED COUNT: 13.9 % (ref 11.6–15.1)
GFR SERPL CREATININE-BSD FRML MDRD: 129 ML/MIN/1.73SQ M
GLUCOSE P FAST SERPL-MCNC: 97 MG/DL (ref 65–99)
HCT VFR BLD AUTO: 44.4 % (ref 36.5–49.3)
HDLC SERPL-MCNC: 40 MG/DL
HGB BLD-MCNC: 14.7 G/DL (ref 12–17)
LDLC SERPL CALC-MCNC: 55 MG/DL (ref 0–100)
LYMPHOCYTES # BLD AUTO: 29 % (ref 14–44)
LYMPHOCYTES # BLD AUTO: 4.5 THOUSAND/UL (ref 0.6–4.47)
MCH RBC QN AUTO: 30.5 PG (ref 26.8–34.3)
MCHC RBC AUTO-ENTMCNC: 33.1 G/DL (ref 31.4–37.4)
MCV RBC AUTO: 92 FL (ref 82–98)
MONOCYTES # BLD AUTO: 0.69 THOUSAND/UL (ref 0–1.22)
MONOCYTES NFR BLD: 6 % (ref 4–12)
NEUTROPHILS # BLD MANUAL: 5.77 THOUSAND/UL (ref 1.85–7.62)
NEUTS SEG NFR BLD AUTO: 50 % (ref 43–75)
PLATELET # BLD AUTO: 316 THOUSANDS/UL (ref 149–390)
PLATELET BLD QL SMEAR: ADEQUATE
PMV BLD AUTO: 10.3 FL (ref 8.9–12.7)
POTASSIUM SERPL-SCNC: 3.9 MMOL/L (ref 3.5–5.3)
PROT SERPL-MCNC: 6.8 G/DL (ref 6.4–8.4)
RBC # BLD AUTO: 4.82 MILLION/UL (ref 3.88–5.62)
RBC MORPH BLD: NORMAL
SODIUM SERPL-SCNC: 142 MMOL/L (ref 135–147)
TRIGL SERPL-MCNC: 309 MG/DL
TSH SERPL DL<=0.05 MIU/L-ACNC: 4.33 UIU/ML (ref 0.45–4.5)
VARIANT LYMPHS # BLD AUTO: 10 %
WBC # BLD AUTO: 11.53 THOUSAND/UL (ref 4.31–10.16)

## 2023-12-11 PROCEDURE — 85027 COMPLETE CBC AUTOMATED: CPT

## 2023-12-11 PROCEDURE — 85007 BL SMEAR W/DIFF WBC COUNT: CPT

## 2023-12-11 PROCEDURE — 80053 COMPREHEN METABOLIC PANEL: CPT

## 2023-12-11 PROCEDURE — 36415 COLL VENOUS BLD VENIPUNCTURE: CPT

## 2023-12-11 PROCEDURE — 84443 ASSAY THYROID STIM HORMONE: CPT

## 2023-12-11 PROCEDURE — 80061 LIPID PANEL: CPT

## 2023-12-11 NOTE — PROGRESS NOTES
[address] [place] [address]    NAME: Ave Norwood  AGE: 51 y.o. SEX: male  : 1975     DATE: 2023     Assessment and Plan:     Problem List Items Addressed This Visit       Cervical radiculopathy    Relevant Orders    Ambulatory referral to Spine & Pain Management    Essential hypertriglyceridemia    Relevant Medications    fenofibrate (TRICOR) 145 mg tablet    Other Relevant Orders    Lipid Panel with Direct LDL reflex    GERD (gastroesophageal reflux disease)    Relevant Medications    lansoprazole (PREVACID) 30 mg capsule    Lumbar radiculopathy    Relevant Orders    Ambulatory referral to Spine & Pain Management    Essential hypertension    Class 1 obesity due to excess calories without serious comorbidity with body mass index (BMI) of 30.0 to 30.9 in adult    Persistent depressive disorder    Relevant Medications    DULoxetine (CYMBALTA) 60 mg delayed release capsule     Other Visit Diagnoses       Annual physical exam    -  Primary    Recurrent major depressive disorder, in partial remission (HCC)        Relevant Medications    DULoxetine (CYMBALTA) 60 mg delayed release capsule    Benign prostatic hyperplasia with weak urinary stream        Relevant Medications    tamsulosin (FLOMAX) 0.4 mg    Other Relevant Orders    PSA, Total Screen    Ambulatory Referral to Urology    Screening for malignant neoplasm of prostate        Relevant Orders    PSA, Total Screen    Erectile dysfunction, unspecified erectile dysfunction type        Relevant Orders    Ambulatory Referral to Urology    Right otitis media, unspecified otitis media type        Relevant Medications    amoxicillin (AMOXIL) 500 mg capsule    Chronic dysfunction of right eustachian tube        Relevant Orders    Ambulatory Referral to Otolaryngology              Immunizations and preventive care screenings were discussed with patient today.  Appropriate education was printed on patient's after visit summary. Counseling:  Alcohol/drug use: discussed moderation in alcohol intake, the recommendations for healthy alcohol use, and avoidance of illicit drug use. Dental Health: discussed importance of regular tooth brushing, flossing, and dental visits. Injury prevention: discussed safety/seat belts, safety helmets, smoke detectors, carbon dioxide detectors, and smoking near bedding or upholstery. Sexual health: discussed sexually transmitted diseases, partner selection, use of condoms, avoidance of unintended pregnancy, and contraceptive alternatives. Exercise: the importance of regular exercise/physical activity was discussed. Recommend exercise 3-5 times per week for at least 30 minutes. Return in about 6 months (around 6/12/2024). Chief Complaint:     Chief Complaint   Patient presents with    Follow-up      History of Present Illness:     Adult Annual Physical   Patient here for a comprehensive physical exam. The patient reports problems - frequent urination, ED, and cervical radiculopathy RUE . IPSS Questionnaire (AUA-7): Over the past month…    1)  How often have you had a sensation of not emptying your bladder completely after you finish urinating? 3 - About half the time   2)  How often have you had to urinate again less than two hours after you finished urinating? 4 - More than half the time   3)  How often have you found you stopped and started again several times when you urinated? 3 - About half the time   4) How difficult have you found it to postpone urination? 3 - About half the time   5) How often have you had a weak urinary stream?  4 - More than half the time   6) How often have you had to push or strain to begin urination? 4 - More than half the time   7) How many times did you most typically get up to urinate from the time you went to bed until the time you got up in the morning?   3 - 3 times   Total score:          20-35 severely symptomatic       Patient presents with: Follow-up   Pt has 3 concerns to discuss today. Pt PMHx depressive disorder, obesity, HTN, GERD, essential hypertriglyceridemia, tobacco dependence, anxiety, an cervical and lumbar radiculopathy whom hasn't been seen since 2/2022 present for medication refill, AWE, and serology review. Pt reports radicular s/s of RUE when leaning on RUE and when he has arm secured in pocket. Pt denies knowledge of cervical XR, however it is noted in chart he was seeing PMR for Lumbar and cervical radiculopathy. Cervical XR 10/2020 revealed Mild disc space narrowing C4-C5 congruent with Pt s/s. Recommend scheduling another apt with PMR. Pt also reports early Prostate issues in family. He has a PSA in 2020 which was unremarkable however Pt reports he is having s/s of BPH. Scored 27 on AUA, will start Flomax and consult Urology. Pt also reports right ear pain. He has had this for several weeks and feels that the ear will not poop. Exam reveals an erythematic TM. After ordering Amoxil, Pt state that he had a dental issue of same area and was taking father old amoxil prior to received Rx from Dentist. Offer ENT consolation which Pt is agreeable. Depression  This is a chronic problem. The symptoms are aggravated by stress. Treatments tried: duloxetine. The treatment provided moderate relief. Anxiety  Presents for follow-up visit. Symptoms occur most days. The severity of symptoms is moderate. The quality of sleep is fair. Nighttime awakenings: occasional.     Compliance with medications is %. Hypertension  This is a chronic problem. The problem is controlled. Associated symptoms include anxiety. There are no associated agents to hypertension. Risk factors for coronary artery disease include male gender. Past treatments include angiotensin blockers. There are no compliance problems. There is no history of angina, kidney disease or CAD/MI.  There is no history of chronic renal disease. Heartburn  This is a chronic problem. The symptoms are aggravated by certain foods. There are no known risk factors. He has tried a PPI for the symptoms. The treatment provided moderate relief. Diet and Physical Activity  Diet/Nutrition: well balanced diet, limited junk food, consuming 3-5 servings of fruits/vegetables daily, and limited fruits/vegetables. Exercise: no formal exercise. Depression Screening  PHQ-2/9 Depression Screening    Little interest or pleasure in doing things: 0 - not at all  Feeling down, depressed, or hopeless: 0 - not at all  Trouble falling or staying asleep, or sleeping too much: 2 - more than half the days  Feeling tired or having little energy: 2 - more than half the days  Poor appetite or overeatin - not at all  Feeling bad about yourself - or that you are a failure or have let yourself or your family down: 0 - not at all  Trouble concentrating on things, such as reading the newspaper or watching television: 2 - more than half the days  Moving or speaking so slowly that other people could have noticed. Or the opposite - being so fidgety or restless that you have been moving around a lot more than usual: 0 - not at all  Thoughts that you would be better off dead, or of hurting yourself in some way: 0 - not at all  PHQ-9 Score: 6   PHQ-9 Interpretation: Mild depression          General Health  Sleep: gets 4-6 hours of sleep on average and with frequent interruption in sleep 2/2 urinary s/s above. [place] Hearing: decreased - bilateral.has worked in loud environments all his life. Vision: vision problems: blurry vision with current glasses, most recent eye exam >1 year ago, and wears glasses. Dental: no dental visits for >1 year, brushes teeth once daily, and flosses teeth occasionally.         Health  Symptoms include: erectile dysfunction, incomplete bladder emptying, nocturia, and weak urinary stream    Advanced Care Planning  Do you have an advanced directive? no  Do you have a durable medical power of ? no     Review of Systems:     Review of Systems   Psychiatric/Behavioral:  Positive for depression. All other systems reviewed and are negative. Past Medical History:     Past Medical History:   Diagnosis Date    AC separation, right, sequela     Anxiety     Chest pain     Chronic arm pain     Chronic back pain     Colon polyp     GERD (gastroesophageal reflux disease)     Hyperlipidemia     Hypertension     Vitamin D deficiency       Past Surgical History:     Past Surgical History:   Procedure Laterality Date    CARPAL TUNNEL RELEASE      COLONOSCOPY      NEUROPLASTY / TRANSPOSITION MEDIAN NERVE AT CARPAL TUNNEL      TENDON REPAIR      Extensor      Family History:     Family History   Problem Relation Age of Onset    No Known Problems Mother     Diabetes Father         Mellitus    Diabetes Paternal Uncle       Social History:     Social History     Socioeconomic History    Marital status: /Civil Union     Spouse name: None    Number of children: None    Years of education: None    Highest education level: None   Occupational History     Employer: "[employer]   Tobacco Use    Smoking status: Every Day     Packs/day: 1.50     Years: 25.00     Total pack years: 37.50     Types: Cigarettes    Smokeless tobacco: Never   Vaping Use    Vaping Use: Never used   Substance and Sexual Activity    Alcohol use: Not Currently     Comment: on weekends    Drug use: Yes     Types: Marijuana     Comment: Medical marijuana    Sexual activity: None   Other Topics Concern    None   Social History Narrative        Employed- Full Time- [employer] .      Lives at home with spouse      Social Determinants of Health     Financial Resource Strain: Not on file   Food Insecurity: Not on file   Transportation Needs: Not on file   Physical Activity: Not on file   Stress: Not on file   Social Connections: Not on file   Intimate Partner Violence: Not on file   Housing Stability: Not on file      Current Medications:     Current Outpatient Medications   Medication Sig Dispense Refill    amoxicillin (AMOXIL) 500 mg capsule Take 1 capsule (500 mg total) by mouth every 8 (eight) hours for 10 days 30 capsule 0    aspirin 81 mg chewable tablet Chew 81 mg daily      Cholecalciferol (Vitamin D3) 50 MCG (2000 UT) TABS Take 1 tablet (2,000 Units total) by mouth daily 90 tablet 0    DULoxetine (CYMBALTA) 60 mg delayed release capsule Take 1 capsule (60 mg total) by mouth daily 90 capsule 1    fenofibrate (TRICOR) 145 mg tablet Take 1 tablet (145 mg total) by mouth daily 90 tablet 1    lansoprazole (PREVACID) 30 mg capsule Take 1 capsule (30 mg total) by mouth daily 90 capsule 1    tamsulosin (FLOMAX) 0.4 mg Take 1 capsule (0.4 mg total) by mouth daily with dinner 90 capsule 3    olmesartan (BENICAR) 5 mg tablet Take 1 tablet (5 mg total) by mouth daily 90 tablet 1     No current facility-administered medications for this visit. Allergies: Allergies   Allergen Reactions    Other      Annotation - [id number]: [place] M[place]      Physical Exam:     /76   Pulse 86   Temp (!) 97 °F (36.1 °C) (Tympanic)   Ht 6' (1.829 m)   Wt 104 kg (229 lb 9.6 oz)   SpO2 97%   BMI 31.14 kg/m²     Physical Exam  Vitals and nursing note reviewed. Constitutional:       Appearance: Normal appearance. He is well-developed. HENT:      Head: Normocephalic and atraumatic. Right Ear: External ear normal.      Left Ear: External ear normal.      Nose: Nose normal.   Eyes:      Conjunctiva/sclera: Conjunctivae normal.      Pupils: Pupils are equal, round, and reactive to light. Cardiovascular:      Rate and Rhythm: Normal rate and regular rhythm. Heart sounds: Normal heart sounds. Pulmonary:      Effort: Pulmonary effort is normal.      Breath sounds: Normal breath sounds. Abdominal:      General: Bowel sounds are normal.      Palpations: Abdomen is soft. Genitourinary:     Comments: Deferred  Musculoskeletal:         General: Normal range of motion. Cervical back: Normal range of motion and neck supple. Skin:     General: Skin is warm and dry. Capillary Refill: Capillary refill takes less than 2 seconds. Neurological:      Mental Status: He is alert and oriented to person, place, and time. Psychiatric:         Behavior: Behavior normal.         Thought Content: Thought content normal.         Judgment: Judgment normal.        Appointment on 12/11/2023   Component Date Value Ref Range Status    Cholesterol 12/11/2023 157  See Comment mg/dL Final    Cholesterol:         Pediatric <18 Years        Desirable          <170 mg/dL      Borderline High    170-199 mg/dL      High               >=200 mg/dL        Adult >=18 Years            Desirable         <200 mg/dL      Borderline High   200-239 mg/dL      High              >239 mg/dL      Triglycerides 12/11/2023 309 (H)  See Comment mg/dL Final    Triglyceride:     0-9Y            <75mg/dL     10Y-17Y         <90 mg/dL       >=18Y     Normal          <150 mg/dL     Borderline High 150-199 mg/dL     High            200-499 mg/dL        Very High       >499 mg/dL    Specimen collection should occur prior to Metamizole administration due to the potential for falsely depressed results. HDL, Direct 12/11/2023 40  >=40 mg/dL Final    LDL Calculated 12/11/2023 55  0 - 100 mg/dL Final    LDL Cholesterol:     Optimal           <100 mg/dl     Near Optimal      100-129 mg/dl     Above Optimal       Borderline High 130-159 mg/dl       High            160-189 mg/dl       Very High       >189 mg/dl         This screening LDL is a calculated result. It does not have the accuracy of the Direct Measured LDL in the monitoring of patients with hyperlipidemia and/or statin therapy. Direct Measure LDL ([id number]) must be ordered separately in these patients.     WBC 12/11/2023 11.53 (H)  4.31 - 10.16 Thousand/uL Final RBC 12/11/2023 4.82  3.88 - 5.62 Million/uL Final    Hemoglobin 12/11/2023 14.7  12.0 - 17.0 g/dL Final    Hematocrit 12/11/2023 44.4  36.5 - 49.3 % Final    MCV 12/11/2023 92  82 - 98 fL Final    MCH 12/11/2023 30.5  26.8 - 34.3 pg Final    MCHC 12/11/2023 33.1  31.4 - 37.4 g/dL Final    RDW 12/11/2023 13.9  11.6 - 15.1 % Final    MPV 12/11/2023 10.3  8.9 - 12.7 fL Final    Platelets 11/81/8603 316  149 - 390 Thousands/uL Final    TSH 3RD GENERATON 12/11/2023 4.334  0.450 - 4.500 uIU/mL Final    Adult TSH (3rd generation) reference range follows the recommended guidelines of the American Thyroid Association, January, 2020. Sodium 12/11/2023 142  135 - 147 mmol/L Final    Potassium 12/11/2023 3.9  3.5 - 5.3 mmol/L Final    Chloride 12/11/2023 107  96 - 108 mmol/L Final    CO2 12/11/2023 25  21 - 32 mmol/L Final    ANION GAP 12/11/2023 10  mmol/L Final    BUN 12/11/2023 12  5 - 25 mg/dL Final    Creatinine 12/11/2023 0.48 (L)  0.60 - 1.30 mg/dL Final    Standardized to IDMS reference method    Glucose, Fasting 12/11/2023 97  65 - 99 mg/dL Final    Calcium 12/11/2023 9.1  8.4 - 10.2 mg/dL Final    AST 12/11/2023 27  13 - 39 U/L Final    ALT 12/11/2023 57 (H)  7 - 52 U/L Final    Specimen collection should occur prior to Sulfasalazine administration due to the potential for falsely depressed results. Alkaline Phosphatase 12/11/2023 55  34 - 104 U/L Final    Total Protein 12/11/2023 6.8  6.4 - 8.4 g/dL Final    Albumin 12/11/2023 4.3  3.5 - 5.0 g/dL Final    Total Bilirubin 12/11/2023 0.25  0.20 - 1.00 mg/dL Final    Use of this assay is not recommended for patients undergoing treatment with eltrombopag due to the potential for falsely elevated results. N-acetyl-p-benzoquinone imine (metabolite of Acetaminophen) will generate erroneously low results in samples for patients that have taken an overdose of Acetaminophen.     eGFR 12/11/2023 129  ml/min/1.73sq m Final    Segmented % 12/11/2023 50  43 - 75 % Final Lymphocytes % 12/11/2023 29  14 - 44 % Final    Monocytes % 12/11/2023 6  4 - 12 % Final    Eosinophils, % 12/11/2023 3  0 - 6 % Final    Basophils % 12/11/2023 2 (H)  0 - 1 % Final    Atypical Lymphocytes % 12/11/2023 10 (H)  <=0 % Final    Absolute Neutrophils 12/11/2023 5.77  1.85 - 7.62 Thousand/uL Final    Lymphocytes Absolute 12/11/2023 4.50 (H)  0.60 - 4.47 Thousand/uL Final    Monocytes Absolute 12/11/2023 0.69  0.00 - 1.22 Thousand/uL Final    Eosinophils Absolute 12/11/2023 0.35  0.00 - 0.40 Thousand/uL Final    Basophils Absolute 12/11/2023 0.23 (H)  0.00 - 0.10 Thousand/uL Final    RBC Morphology 12/11/2023 Normal   Final    Platelet Estimate 00/65/0508 Adequate  Adequate Final     I have reviewed all the lab results. There are some abnormalities that are not critical to the patient's health, I have discussed these in person at this office appointment.     [place], [id number] [facility]

## 2023-12-11 NOTE — PROGRESS NOTES
Assessment/Plan:    Problem List Items Addressed This Visit    None       There are no diagnoses linked to this encounter. No problem-specific Assessment & Plan notes found for this encounter. Subjective:      Patient ID: Torey Melendrez is a 51 y.o. male. Pt PMHx depressive disorder, obesity, HTN, GERD, essential hypertriglyceridemia, tobacco dependence, anxiety, an cervical and lumbar radiculopathy whom hasn't been seen since 2/2022 present for medication refill, AWE, and serology review. Depression  This is a chronic problem. The symptoms are aggravated by stress. Treatments tried: duloxetine. The treatment provided moderate relief. Anxiety  Presents for follow-up visit. The severity of symptoms is moderate. The quality of sleep is fair. Nighttime awakenings: occasional.     Compliance with medications is %. Hypertension  This is a chronic problem. Associated symptoms include anxiety. There are no associated agents to hypertension. Risk factors for coronary artery disease include dyslipidemia and male gender. Past treatments include angiotensin blockers. There is no history of angina, kidney disease or CAD/MI. There is no history of chronic renal disease. Heartburn  This is a chronic problem. The symptoms are aggravated by certain foods. There are no known risk factors. He has tried a PPI and a diet change for the symptoms. The treatment provided moderate relief. The following portions of the patient's history were reviewed and updated as appropriate:   He has a past medical history of AC separation, right, sequela, Anxiety, Chest pain, Chronic arm pain, Chronic back pain, Colon polyp, GERD (gastroesophageal reflux disease), Hyperlipidemia, Hypertension, and Vitamin D deficiency. ,  does not have any pertinent problems on file. ,   has a past surgical history that includes Tendon repair; Neuroplasty / transposition median nerve at carpal tunnel; Colonoscopy; and Carpal tunnel release. ,  family history includes Diabetes in his father and paternal uncle; No Known Problems in his mother. ,   reports that he has been smoking cigarettes. He has a 37.50 pack-year smoking history. He has never used smokeless tobacco. He reports current alcohol use of about 36.0 standard drinks of alcohol per week. He reports current drug use. Drug: Marijuana. ,  is allergic to other. .  Current Outpatient Medications   Medication Sig Dispense Refill   • DULoxetine (CYMBALTA) 60 mg delayed release capsule Take 1 capsule (60 mg total) by mouth daily 30 capsule 0   • aspirin 81 mg chewable tablet Chew 81 mg daily     • Cholecalciferol (Vitamin D3) 50 MCG (2000 UT) TABS Take 1 tablet (2,000 Units total) by mouth daily 90 tablet 0   • fenofibrate (TRICOR) 54 MG tablet take 1 take by mouth daily 30 tablet 0   • lansoprazole (PREVACID) 30 mg capsule take 1 capsule by mouth once daily 90 capsule 0   • olmesartan (BENICAR) 5 mg tablet take 1 tablet by mouth once daily 90 tablet 1     No current facility-administered medications for this visit. Review of Systems   Psychiatric/Behavioral:  Positive for depression. All other systems reviewed and are negative. Objective: There were no vitals filed for this visit. There is no height or weight on file to calculate BMI. Physical Exam  Vitals and nursing note reviewed. Constitutional:       Appearance: Normal appearance. He is well-developed. HENT:      Head: Normocephalic and atraumatic. Right Ear: Tympanic membrane, ear canal and external ear normal.      Left Ear: Tympanic membrane, ear canal and external ear normal.      Nose: Nose normal.      Mouth/Throat:      Mouth: Mucous membranes are moist.      Pharynx: Uvula midline. Eyes:      General: Lids are normal.      Conjunctiva/sclera: Conjunctivae normal.      Pupils: Pupils are equal, round, and reactive to light. Neck:      Thyroid: No thyroid mass. Vascular: No JVD. Trachea: Trachea and phonation normal.   Cardiovascular:      Rate and Rhythm: Normal rate and regular rhythm. Pulses: Normal pulses. Heart sounds: Normal heart sounds, S1 normal and S2 normal. No murmur heard. No friction rub. No gallop. Pulmonary:      Effort: Pulmonary effort is normal.      Breath sounds: Normal breath sounds. Abdominal:      General: Bowel sounds are normal.      Palpations: Abdomen is soft. Tenderness: There is no abdominal tenderness. Genitourinary:     Comments: Deferred  Musculoskeletal:         General: Normal range of motion. Cervical back: Full passive range of motion without pain, normal range of motion and neck supple. Right lower leg: No edema. Left lower leg: No edema. Lymphadenopathy:      Head:      Right side of head: No submental, submandibular, tonsillar, preauricular, posterior auricular or occipital adenopathy. Left side of head: No submental, submandibular, tonsillar, preauricular, posterior auricular or occipital adenopathy. Cervical: No cervical adenopathy. Skin:     General: Skin is warm and dry. Capillary Refill: Capillary refill takes less than 2 seconds. Neurological:      General: No focal deficit present. Mental Status: He is alert and oriented to person, place, and time. Sensory: Sensation is intact. Motor: Motor function is intact. Coordination: Coordination is intact. Gait: Gait is intact. Psychiatric:         Attention and Perception: Attention and perception normal.         Mood and Affect: Mood and affect normal.         Speech: Speech normal.         Behavior: Behavior normal. Behavior is cooperative. Thought Content: Thought content normal.         Cognition and Memory: Cognition normal.         Judgment: Judgment normal.           Portions of the record may have been created with voice recognition software.  Occasional wrong word or "sound a like" substitutions may have occurred due to the inherent limitations of voice recognition software. Read the chart carefully and recognize, using context, where substitutions have occurred. Contact me with any questions.

## 2023-12-12 ENCOUNTER — OFFICE VISIT (OUTPATIENT)
Dept: FAMILY MEDICINE CLINIC | Facility: CLINIC | Age: 48
End: 2023-12-12
Payer: COMMERCIAL

## 2023-12-12 VITALS
SYSTOLIC BLOOD PRESSURE: 120 MMHG | DIASTOLIC BLOOD PRESSURE: 76 MMHG | TEMPERATURE: 97 F | BODY MASS INDEX: 31.1 KG/M2 | OXYGEN SATURATION: 97 % | HEIGHT: 72 IN | HEART RATE: 86 BPM | WEIGHT: 229.6 LBS

## 2023-12-12 DIAGNOSIS — N52.9 ERECTILE DYSFUNCTION, UNSPECIFIED ERECTILE DYSFUNCTION TYPE: ICD-10-CM

## 2023-12-12 DIAGNOSIS — I10 ESSENTIAL HYPERTENSION: ICD-10-CM

## 2023-12-12 DIAGNOSIS — M54.12 CERVICAL RADICULOPATHY: ICD-10-CM

## 2023-12-12 DIAGNOSIS — F33.41 RECURRENT MAJOR DEPRESSIVE DISORDER, IN PARTIAL REMISSION (HCC): ICD-10-CM

## 2023-12-12 DIAGNOSIS — E66.09 CLASS 1 OBESITY DUE TO EXCESS CALORIES WITHOUT SERIOUS COMORBIDITY WITH BODY MASS INDEX (BMI) OF 30.0 TO 30.9 IN ADULT: ICD-10-CM

## 2023-12-12 DIAGNOSIS — H69.91 CHRONIC DYSFUNCTION OF RIGHT EUSTACHIAN TUBE: ICD-10-CM

## 2023-12-12 DIAGNOSIS — N40.1 BENIGN PROSTATIC HYPERPLASIA WITH WEAK URINARY STREAM: ICD-10-CM

## 2023-12-12 DIAGNOSIS — F34.1 PERSISTENT DEPRESSIVE DISORDER: ICD-10-CM

## 2023-12-12 DIAGNOSIS — K21.9 GASTROESOPHAGEAL REFLUX DISEASE WITHOUT ESOPHAGITIS: ICD-10-CM

## 2023-12-12 DIAGNOSIS — Z12.5 SCREENING FOR MALIGNANT NEOPLASM OF PROSTATE: ICD-10-CM

## 2023-12-12 DIAGNOSIS — Z00.00 ANNUAL PHYSICAL EXAM: Primary | ICD-10-CM

## 2023-12-12 DIAGNOSIS — R39.12 BENIGN PROSTATIC HYPERPLASIA WITH WEAK URINARY STREAM: ICD-10-CM

## 2023-12-12 DIAGNOSIS — M54.16 LUMBAR RADICULOPATHY: ICD-10-CM

## 2023-12-12 DIAGNOSIS — H66.91 RIGHT OTITIS MEDIA, UNSPECIFIED OTITIS MEDIA TYPE: ICD-10-CM

## 2023-12-12 DIAGNOSIS — E78.1 ESSENTIAL HYPERTRIGLYCERIDEMIA: ICD-10-CM

## 2023-12-12 PROCEDURE — 99214 OFFICE O/P EST MOD 30 MIN: CPT | Performed by: NURSE PRACTITIONER

## 2023-12-12 PROCEDURE — 99396 PREV VISIT EST AGE 40-64: CPT | Performed by: NURSE PRACTITIONER

## 2023-12-12 RX ORDER — OLMESARTAN MEDOXOMIL 5 MG/1
5 TABLET ORAL DAILY
Qty: 90 TABLET | Refills: 1 | Status: SHIPPED | OUTPATIENT
Start: 2023-12-12

## 2023-12-12 RX ORDER — AMOXICILLIN 500 MG/1
500 CAPSULE ORAL EVERY 8 HOURS SCHEDULED
Qty: 30 CAPSULE | Refills: 0 | Status: SHIPPED | OUTPATIENT
Start: 2023-12-12 | End: 2023-12-22

## 2023-12-12 RX ORDER — DULOXETIN HYDROCHLORIDE 60 MG/1
60 CAPSULE, DELAYED RELEASE ORAL DAILY
Qty: 90 CAPSULE | Refills: 1 | Status: SHIPPED | OUTPATIENT
Start: 2023-12-12

## 2023-12-12 RX ORDER — TAMSULOSIN HYDROCHLORIDE 0.4 MG/1
0.4 CAPSULE ORAL
Qty: 90 CAPSULE | Refills: 3 | Status: SHIPPED | OUTPATIENT
Start: 2023-12-12

## 2023-12-12 RX ORDER — FENOFIBRATE 145 MG/1
145 TABLET, COATED ORAL DAILY
Qty: 90 TABLET | Refills: 1 | Status: SHIPPED | OUTPATIENT
Start: 2023-12-12

## 2023-12-12 RX ORDER — LANSOPRAZOLE 30 MG/1
30 CAPSULE, DELAYED RELEASE ORAL DAILY
Qty: 90 CAPSULE | Refills: 1 | Status: SHIPPED | OUTPATIENT
Start: 2023-12-12

## 2024-01-01 NOTE — TELEPHONE ENCOUNTER
Please advise   Dr Moo Au ordered 2 test for patient  an [id number] other was a [id number]   I needed a peer to peer on the Echo   I had Jose approved  After peer to peer was done [facility] was withdrawn, Echo didn't get approved and now there is an order for 78916/St Echo   Is this the test you want?? Other task said case for echo couldn't be reopened and I'm assuming it was closed b/c time limit was up  Let me know please if this st echo is ok so I can take care of this    Thanks [facility]

## 2024-06-08 DIAGNOSIS — I10 ESSENTIAL HYPERTENSION: ICD-10-CM

## 2024-06-08 DIAGNOSIS — E78.1 ESSENTIAL HYPERTRIGLYCERIDEMIA: ICD-10-CM

## 2024-06-08 DIAGNOSIS — K21.9 GASTROESOPHAGEAL REFLUX DISEASE WITHOUT ESOPHAGITIS: ICD-10-CM

## 2024-06-08 DIAGNOSIS — F33.41 RECURRENT MAJOR DEPRESSIVE DISORDER, IN PARTIAL REMISSION (HCC): ICD-10-CM

## 2024-06-08 RX ORDER — OLMESARTAN MEDOXOMIL 5 MG/1
5 TABLET ORAL DAILY
Qty: 90 TABLET | Refills: 1 | Status: SHIPPED | OUTPATIENT
Start: 2024-06-08

## 2024-06-08 RX ORDER — LANSOPRAZOLE 30 MG/1
30 CAPSULE, DELAYED RELEASE ORAL DAILY
Qty: 90 CAPSULE | Refills: 1 | Status: SHIPPED | OUTPATIENT
Start: 2024-06-08

## 2024-06-08 RX ORDER — DULOXETIN HYDROCHLORIDE 60 MG/1
60 CAPSULE, DELAYED RELEASE ORAL DAILY
Qty: 90 CAPSULE | Refills: 1 | Status: SHIPPED | OUTPATIENT
Start: 2024-06-08

## 2024-06-10 RX ORDER — FENOFIBRATE 145 MG/1
145 TABLET, COATED ORAL DAILY
Qty: 90 TABLET | Refills: 1 | Status: SHIPPED | OUTPATIENT
Start: 2024-06-10

## 2024-06-19 ENCOUNTER — TELEPHONE (OUTPATIENT)
Dept: FAMILY MEDICINE CLINIC | Facility: CLINIC | Age: 49
End: 2024-06-19

## 2024-06-19 NOTE — TELEPHONE ENCOUNTER
6/19 provider cx appt, not enough time allowed for New Pt  lm for patient to call back and reschedule appt. Can see either provider or PA

## 2024-06-25 ENCOUNTER — TELEPHONE (OUTPATIENT)
Dept: FAMILY MEDICINE CLINIC | Facility: CLINIC | Age: 49
End: 2024-06-25

## 2024-10-28 ENCOUNTER — HOSPITAL ENCOUNTER (EMERGENCY)
Facility: HOSPITAL | Age: 49
Discharge: HOME | End: 2024-10-28
Attending: FAMILY MEDICINE
Payer: COMMERCIAL

## 2024-10-28 VITALS
DIASTOLIC BLOOD PRESSURE: 78 MMHG | OXYGEN SATURATION: 95 % | TEMPERATURE: 98.3 F | RESPIRATION RATE: 19 BRPM | SYSTOLIC BLOOD PRESSURE: 150 MMHG | HEART RATE: 93 BPM

## 2024-10-28 DIAGNOSIS — M79.89 LEG SWELLING: ICD-10-CM

## 2024-10-28 DIAGNOSIS — H93.8X9 EAR FULLNESS: ICD-10-CM

## 2024-10-28 DIAGNOSIS — R09.81 NASAL CONGESTION: Primary | ICD-10-CM

## 2024-10-28 PROCEDURE — 99284 EMERGENCY DEPT VISIT MOD MDM: CPT | Performed by: FAMILY MEDICINE

## 2024-10-28 PROCEDURE — 99284 EMERGENCY DEPT VISIT MOD MDM: CPT

## 2024-10-28 RX ORDER — CIPROFLOXACIN AND DEXAMETHASONE 3; 1 MG/ML; MG/ML
4 SUSPENSION/ DROPS AURICULAR (OTIC) 2 TIMES DAILY
Qty: 7.5 ML | Refills: 0 | Status: SHIPPED | OUTPATIENT
Start: 2024-10-28

## 2024-10-28 RX ORDER — CIPROFLOXACIN AND DEXAMETHASONE 3; 1 MG/ML; MG/ML
4 SUSPENSION/ DROPS AURICULAR (OTIC) 2 TIMES DAILY
Status: DISCONTINUED | OUTPATIENT
Start: 2024-10-28 | End: 2024-10-28 | Stop reason: HOSPADM

## 2024-10-28 RX ADMIN — CIPROFLOXACIN AND DEXAMETHASONE 4 DROP: 3; 1 SUSPENSION/ DROPS AURICULAR (OTIC) at 02:43

## 2024-10-28 NOTE — ED PROVIDER NOTES
Time reflects when diagnosis was documented in both MDM as applicable and the Disposition within this note       Time User Action Codes Description Comment    10/28/2024  2:37 AM Uli Fagan Add [R09.81] Nasal congestion     10/28/2024  2:37 AM Rylan Uli Add [H93.8X9] Ear fullness     10/28/2024  2:38 AM Ryder Fagant Add [M79.89] Leg swelling           ED Disposition       ED Disposition   Discharge    Condition   Stable    Date/Time   Mon Oct 28, 2024  2:30 AM    Comment   Zurdo Cheema discharge to home/self care.                   Assessment & Plan       Medical Decision Making  Risk  Prescription drug management.    This a 49-year-old male presented to ED with multiple complaint.  Patient states that he has been having the ear problem over 4 months has been seen by ENT had extensive workup and there was told to obtain an MRI to rule out acoustic neuroma.  He states he has MRI scheduled in December.  Also states that that he saw his dentist that noticed a lesion on his upper lip and was told to get biopsy.  He states he has an appointment with oral surgeon.  Also complaining of left lower leg may need respiratory support something actually mild swelling which she noticed months ago.  States breathing has not seen anybody for this.  Struggling.  Denies any difficulty hearing.  Patient was told by ENT that he has a deviated septum, may need to nasal plasty for his condition  History taken from patient had a detailed discussion patient explaining the process recommended to continue following up with the ENT will start him on Ciprodex unlikely that is going to help recommending following up with the PCP and the general surgery for his a possible lipoma on his left lower leg.         Medications   ciprofloxacin-dexamethasone (CIPRODEX) 0.3-0.1 % otic suspension 4 drop (4 drops Both Ears Given 10/28/24 0243)       ED Risk Strat Scores                                               History of Present Illness        Chief Complaint   Patient presents with    Ear Problem     Has been seen by ENT; needs MRI; scheduled at end of december    Oral Pain     Upper roof of mouth pain; was seen by dentist and they said they need to take a biopsy    Leg Pain     Left leg pain where there is a lump; states it has been like that for about 3 weeks; pain down leg       Past Medical History:   Diagnosis Date    AC separation, right, sequela     Anxiety     Chest pain     Chronic arm pain     Chronic back pain     Colon polyp     GERD (gastroesophageal reflux disease)     Hyperlipidemia     Hypertension     Vitamin D deficiency       Past Surgical History:   Procedure Laterality Date    CARPAL TUNNEL RELEASE      COLONOSCOPY      NEUROPLASTY / TRANSPOSITION MEDIAN NERVE AT CARPAL TUNNEL      TENDON REPAIR      Extensor    WISDOM TOOTH EXTRACTION        Family History   Problem Relation Age of Onset    No Known Problems Mother     Diabetes Father         Mellitus    Diabetes Paternal Uncle       Social History     Tobacco Use    Smoking status: Every Day     Current packs/day: 1.50     Average packs/day: 1.5 packs/day for 25.0 years (37.5 ttl pk-yrs)     Types: Cigarettes    Smokeless tobacco: Never   Vaping Use    Vaping status: Never Used   Substance Use Topics    Alcohol use: Yes     Comment: 3x a week up to a 6 pack    Drug use: Yes     Types: Marijuana     Comment: Medical marijuana      E-Cigarette/Vaping    E-Cigarette Use Never User       E-Cigarette/Vaping Substances    Nicotine No     THC No     CBD No     Flavoring No     Other No     Unknown No       I have reviewed and agree with the history as documented.     HPI  This a 49-year-old male presented to ED with multiple complaint.  Patient states that he has been having the ear problem over 4 months has been seen by ENT had extensive workup and there was told to obtain an MRI to rule out acoustic neuroma.  He states he has MRI scheduled in December.  Also states that that he  saw his dentist that noticed a lesion on his upper lip and was told to get biopsy.  He states he has an appointment with oral surgeon.  Also complaining of left lower leg may need respiratory support something actually mild swelling which she noticed months ago.  States breathing has not seen anybody for this.  Struggling.  Denies any difficulty hearing.    Review of Systems   Constitutional:  Negative for chills and fever.   HENT:  Negative for rhinorrhea and sore throat.         Ear blocked    Eyes:  Negative for visual disturbance.   Respiratory:  Negative for cough and shortness of breath.    Cardiovascular:  Negative for chest pain and leg swelling.   Gastrointestinal:  Negative for abdominal pain, diarrhea, nausea and vomiting.   Genitourinary:  Negative for dysuria.   Musculoskeletal:  Negative for back pain and myalgias.   Skin:  Negative for rash.   Neurological:  Negative for dizziness and headaches.   Psychiatric/Behavioral:  Negative for confusion.    All other systems reviewed and are negative.          Objective       ED Triage Vitals   Temperature Pulse Blood Pressure Respirations SpO2 Patient Position - Orthostatic VS   10/28/24 0220 10/28/24 0218 10/28/24 0218 10/28/24 0218 10/28/24 0218 --   98.3 °F (36.8 °C) 103 (!) 160/102 19 97 %       Temp src Heart Rate Source BP Location FiO2 (%) Pain Score    -- 10/28/24 0218 -- -- 10/28/24 0218     Monitor   6      Vitals      Date and Time Temp Pulse SpO2 Resp BP Pain Score FACES Pain Rating User   10/28/24 0230 -- 93 95 % 19 150/78 -- -- AF   10/28/24 0220 98.3 °F (36.8 °C) -- -- -- -- -- -- AF   10/28/24 0218 -- 103 97 % 19 160/102 6 -- AF            Physical Exam  Vitals and nursing note reviewed.   Constitutional:       General: He is not in acute distress.     Appearance: He is well-developed. He is not diaphoretic.   HENT:      Head: Normocephalic and atraumatic.      Right Ear: External ear normal.      Left Ear: External ear normal.      Nose: Nose  normal.      Mouth/Throat:      Mouth: Mucous membranes are moist.      Pharynx: Oropharynx is clear. No posterior oropharyngeal erythema.   Eyes:      Conjunctiva/sclera: Conjunctivae normal.      Pupils: Pupils are equal, round, and reactive to light.   Cardiovascular:      Rate and Rhythm: Normal rate and regular rhythm.      Pulses: Normal pulses.      Heart sounds: Normal heart sounds.   Pulmonary:      Effort: Pulmonary effort is normal. No respiratory distress.      Breath sounds: Normal breath sounds. No wheezing.   Abdominal:      General: Bowel sounds are normal. There is no distension.      Palpations: Abdomen is soft.      Tenderness: There is no abdominal tenderness.   Musculoskeletal:         General: Normal range of motion.      Cervical back: Normal range of motion and neck supple.      Comments: Left lower leg: mild swelling noted questionable lipoma , no swelling noted no redness noted    Lymphadenopathy:      Cervical: No cervical adenopathy.   Skin:     General: Skin is warm and dry.      Capillary Refill: Capillary refill takes less than 2 seconds.   Neurological:      Mental Status: He is alert and oriented to person, place, and time.   Psychiatric:         Mood and Affect: Mood normal.         Behavior: Behavior normal.         Results Reviewed       None            No orders to display       Procedures    ED Medication and Procedure Management   Prior to Admission Medications   Prescriptions Last Dose Informant Patient Reported? Taking?   Cholecalciferol (Vitamin D3) 50 MCG ( UT) TABS  Self No No   Sig: Take 1 tablet (2,000 Units total) by mouth daily   DULoxetine (CYMBALTA) 60 mg delayed release capsule   No No   Sig: take 1 capsule by mouth daily   aspirin 81 mg chewable tablet  Self Yes No   Sig: Chew 81 mg daily   fenofibrate (TRICOR) 145 mg tablet   No No   Sig: take 1 tablet by mouth daily   fluticasone (FLONASE) 50 mcg/act nasal spray   No No   Si sprays into each nostril daily    lansoprazole (PREVACID) 30 mg capsule   No No   Sig: take 1 capsule by mouth daily   olmesartan (BENICAR) 5 mg tablet   No No   Sig: take 1 tablet by mouth daily   tamsulosin (FLOMAX) 0.4 mg   No No   Sig: Take 1 capsule (0.4 mg total) by mouth daily with dinner      Facility-Administered Medications: None     Discharge Medication List as of 10/28/2024  2:39 AM        START taking these medications    Details   ciprofloxacin-dexamethasone (CIPRODEX) otic suspension Administer 4 drops into both ears 2 (two) times a day, Starting Mon 10/28/2024, Normal           CONTINUE these medications which have NOT CHANGED    Details   aspirin 81 mg chewable tablet Chew 81 mg daily, Historical Med      Cholecalciferol (Vitamin D3) 50 MCG (2000 UT) TABS Take 1 tablet (2,000 Units total) by mouth daily, Starting Mon 8/8/2022, Normal      DULoxetine (CYMBALTA) 60 mg delayed release capsule take 1 capsule by mouth daily, Starting Sat 6/8/2024, Normal      fenofibrate (TRICOR) 145 mg tablet take 1 tablet by mouth daily, Starting Mon 6/10/2024, Normal      fluticasone (FLONASE) 50 mcg/act nasal spray 2 sprays into each nostril daily, Starting Mon 6/24/2024, Until Wed 7/24/2024, Normal      lansoprazole (PREVACID) 30 mg capsule take 1 capsule by mouth daily, Starting Sat 6/8/2024, Normal      olmesartan (BENICAR) 5 mg tablet take 1 tablet by mouth daily, Starting Sat 6/8/2024, Normal      tamsulosin (FLOMAX) 0.4 mg Take 1 capsule (0.4 mg total) by mouth daily with dinner, Starting Tue 12/12/2023, Normal             ED SEPSIS DOCUMENTATION   Time reflects when diagnosis was documented in both MDM as applicable and the Disposition within this note       Time User Action Codes Description Comment    10/28/2024  2:37 AM Uli Fagan [R09.81] Nasal congestion     10/28/2024  2:37 AM Uli Fagan [H93.8X9] Ear fullness     10/28/2024  2:38 AM Uli Fagan [M79.89] Leg swelling                  Uli Fagan MD  10/28/24 5589

## 2024-10-29 ENCOUNTER — VBI (OUTPATIENT)
Dept: FAMILY MEDICINE CLINIC | Facility: CLINIC | Age: 49
End: 2024-10-29

## 2024-10-29 NOTE — TELEPHONE ENCOUNTER
10/29/24 12:06 PM    Patient contacted post ED visit, [facility] department spoke with patient/caregiver and outreach was successful.    Thank you.  Drake Johnson MA  [facility]

## 2024-10-29 NOTE — TELEPHONE ENCOUNTER
10/29/24 10:38 AM    Patient contacted post ED visit, first outreach attempt made. Message was left for patient to return a call to the [facility] Department at [facility]: Phone [phone].    Thank you.  Drake Johnson MA  [facility]

## 2024-12-20 DIAGNOSIS — F33.41 RECURRENT MAJOR DEPRESSIVE DISORDER, IN PARTIAL REMISSION (HCC): ICD-10-CM

## 2024-12-20 DIAGNOSIS — R39.12 BENIGN PROSTATIC HYPERPLASIA WITH WEAK URINARY STREAM: ICD-10-CM

## 2024-12-20 DIAGNOSIS — N40.1 BENIGN PROSTATIC HYPERPLASIA WITH WEAK URINARY STREAM: ICD-10-CM

## 2024-12-20 DIAGNOSIS — K21.9 GASTROESOPHAGEAL REFLUX DISEASE WITHOUT ESOPHAGITIS: ICD-10-CM

## 2024-12-20 NOTE — TELEPHONE ENCOUNTER
Reason for call:   [x] Refill   [] Prior Auth  [] Other:     Office:   [x] PCP/Provider -  JOCE Song   [] Specialty/Provider -     Medication: DULoxetine 60 mg / 2 cap daily / 90 caps                      (PREVACID) 30 mg / 1 cap daily / 90 caps                       (FLOMAX) 0.4 mg / 1 cap daily / 90 caps      [id number] [address]      Does the patient have enough for 3 days?   [x] Yes   [] No - Send as HP to POD

## 2024-12-22 RX ORDER — LANSOPRAZOLE 30 MG/1
30 CAPSULE, DELAYED RELEASE ORAL DAILY
Qty: 90 CAPSULE | Refills: 1 | Status: SHIPPED | OUTPATIENT
Start: 2024-12-22

## 2024-12-22 RX ORDER — DULOXETIN HYDROCHLORIDE 60 MG/1
60 CAPSULE, DELAYED RELEASE ORAL DAILY
Qty: 90 CAPSULE | Refills: 1 | Status: SHIPPED | OUTPATIENT
Start: 2024-12-22

## 2024-12-22 RX ORDER — TAMSULOSIN HYDROCHLORIDE 0.4 MG/1
0.4 CAPSULE ORAL
Qty: 90 CAPSULE | Refills: 1 | Status: SHIPPED | OUTPATIENT
Start: 2024-12-22

## 2025-01-08 ENCOUNTER — TELEPHONE (OUTPATIENT)
Age: 50
End: 2025-01-08

## 2025-01-08 ENCOUNTER — OFFICE VISIT (OUTPATIENT)
Dept: FAMILY MEDICINE CLINIC | Facility: CLINIC | Age: 50
End: 2025-01-08
Payer: COMMERCIAL

## 2025-01-08 VITALS
DIASTOLIC BLOOD PRESSURE: 98 MMHG | RESPIRATION RATE: 20 BRPM | TEMPERATURE: 98 F | OXYGEN SATURATION: 99 % | HEIGHT: 72 IN | SYSTOLIC BLOOD PRESSURE: 158 MMHG | HEART RATE: 84 BPM | BODY MASS INDEX: 31.69 KG/M2 | WEIGHT: 234 LBS

## 2025-01-08 DIAGNOSIS — Z12.5 SCREENING FOR MALIGNANT NEOPLASM OF PROSTATE: ICD-10-CM

## 2025-01-08 DIAGNOSIS — I10 ESSENTIAL HYPERTENSION: ICD-10-CM

## 2025-01-08 DIAGNOSIS — Z13.0 SCREENING FOR DEFICIENCY ANEMIA: ICD-10-CM

## 2025-01-08 DIAGNOSIS — E78.1 ESSENTIAL HYPERTRIGLYCERIDEMIA: ICD-10-CM

## 2025-01-08 DIAGNOSIS — Z13.1 SCREENING FOR DIABETES MELLITUS: ICD-10-CM

## 2025-01-08 DIAGNOSIS — N40.1 BENIGN PROSTATIC HYPERPLASIA WITH WEAK URINARY STREAM: ICD-10-CM

## 2025-01-08 DIAGNOSIS — F32.2 SEVERE MAJOR DEPRESSIVE DISORDER (HCC): Primary | ICD-10-CM

## 2025-01-08 DIAGNOSIS — R39.12 BENIGN PROSTATIC HYPERPLASIA WITH WEAK URINARY STREAM: ICD-10-CM

## 2025-01-08 DIAGNOSIS — Z13.29 SCREENING FOR THYROID DISORDER: ICD-10-CM

## 2025-01-08 DIAGNOSIS — H93.8X9 EAR FULLNESS: ICD-10-CM

## 2025-01-08 DIAGNOSIS — E55.9 VITAMIN D DEFICIENCY: ICD-10-CM

## 2025-01-08 DIAGNOSIS — K21.9 GASTROESOPHAGEAL REFLUX DISEASE WITHOUT ESOPHAGITIS: ICD-10-CM

## 2025-01-08 DIAGNOSIS — Z00.00 ANNUAL PHYSICAL EXAM: ICD-10-CM

## 2025-01-08 PROCEDURE — 99396 PREV VISIT EST AGE 40-64: CPT

## 2025-01-08 PROCEDURE — 99214 OFFICE O/P EST MOD 30 MIN: CPT

## 2025-01-08 RX ORDER — OLMESARTAN MEDOXOMIL 5 MG/1
5 TABLET ORAL DAILY
Qty: 90 TABLET | Refills: 3 | Status: SHIPPED | OUTPATIENT
Start: 2025-01-08

## 2025-01-08 RX ORDER — BUPROPION HYDROCHLORIDE 150 MG/1
150 TABLET ORAL EVERY MORNING
Qty: 90 TABLET | Refills: 3 | Status: CANCELLED | OUTPATIENT
Start: 2025-01-08 | End: 2026-01-03

## 2025-01-08 RX ORDER — CIPROFLOXACIN AND DEXAMETHASONE 3; 1 MG/ML; MG/ML
4 SUSPENSION/ DROPS AURICULAR (OTIC) 2 TIMES DAILY
Qty: 7.5 ML | Refills: 3 | Status: SHIPPED | OUTPATIENT
Start: 2025-01-08

## 2025-01-08 RX ORDER — FENOFIBRATE 145 MG/1
145 TABLET, COATED ORAL DAILY
Qty: 90 TABLET | Refills: 3 | Status: SHIPPED | OUTPATIENT
Start: 2025-01-08

## 2025-01-08 RX ORDER — MIRTAZAPINE 7.5 MG/1
7.5 TABLET, FILM COATED ORAL
Qty: 90 TABLET | Refills: 3 | Status: SHIPPED | OUTPATIENT
Start: 2025-01-08

## 2025-01-08 RX ORDER — DULOXETIN HYDROCHLORIDE 60 MG/1
60 CAPSULE, DELAYED RELEASE ORAL DAILY
Qty: 90 CAPSULE | Refills: 3 | Status: SHIPPED | OUTPATIENT
Start: 2025-01-08

## 2025-01-08 RX ORDER — LANSOPRAZOLE 30 MG/1
30 CAPSULE, DELAYED RELEASE ORAL DAILY
Qty: 90 CAPSULE | Refills: 3 | Status: SHIPPED | OUTPATIENT
Start: 2025-01-08

## 2025-01-08 RX ORDER — CHOLECALCIFEROL (VITAMIN D3) 50 MCG
2000 TABLET ORAL DAILY
Qty: 90 TABLET | Refills: 3 | Status: SHIPPED | OUTPATIENT
Start: 2025-01-08

## 2025-01-08 RX ORDER — TAMSULOSIN HYDROCHLORIDE 0.4 MG/1
0.4 CAPSULE ORAL
Qty: 90 CAPSULE | Refills: 3 | Status: SHIPPED | OUTPATIENT
Start: 2025-01-08

## 2025-01-08 NOTE — ASSESSMENT & PLAN NOTE
Orders:  •  fenofibrate (TRICOR) 145 mg tablet; Take 1 tablet (145 mg total) by mouth daily  •  Lipid Panel with Direct LDL reflex; Future

## 2025-01-08 NOTE — PATIENT INSTRUCTIONS
"Patient Education     Depression in adults   The Basics   Written by the doctors and editors at [facility]   What is depression? -- Depression is a disorder that makes you sad, but it is different from normal sadness. Depression can make it hard for you to work, study, or do everyday tasks.  What causes depression? -- Depression is caused by problems with chemicals in the brain called \"neurotransmitters.\" Some people might be more likely to have depression if it runs in their family. Other things might also play a role, including hormones, certain health problems, medicines, stress, being mistreated as a child, family problems, and problems with friends or at school or work.  How do I know if I am depressed? -- People with depression feel down most of the time for at least 2 weeks. They also have at least 1 of these 2 symptoms:   They no longer enjoy or care about doing the things that they used to like to do.   They feel sad, down, hopeless, or cranky most of the day, almost every day.  People with depression can also have other symptoms. Examples include:   Changes in your appetite or weight. You might eat too little or too much, or gain or lose weight without trying.   Sleeping too much or too little   Feeling tired or like you have no energy   Feeling guilty, helpless, or like you are worth nothing   Trouble with concentration or memory   Acting restless or have trouble staying still, or moving or speaking more slowly than normal   Repeated thoughts of death or killing yourself  If you think that you might be depressed, see your doctor or nurse. Only someone trained in mental health can tell for sure if you are depressed.  How is depression diagnosed? -- Your doctor or nurse will do a physical exam, ask you questions, and might order tests. Depression can have a big impact on your life. Luckily, depression can be treated, and the sooner treatment is started, the better it works.  Get help right away if you are " "thinking of hurting or killing yourself! -- If you ever feel like you might hurt yourself or someone else, help is available:   In the [place], contact the 479 Suicide & Crisis Lifeline:   To speak to someone, call or text 406.   To talk to someone online, go to www.[employer].org/chat.   Call your doctor or nurse, and tell them it is urgent.   Call for an ambulance (in the [place] and [place], call 9-1-1).   Go to the emergency department at the nearest hospital.  What are the treatments for depression? -- Your doctor or nurse will work with you to make a treatment plan. Treatment can include:   Helping you learn more about depression   Counseling (with a psychiatrist, psychologist, nurse, or )   Medicines that relieve depression   Creating a plan to limit access to items that you might use to harm yourself   Other treatments that pass magnetic waves or electricity into the brain  In addition to treatment, getting regular physical activity can also help you feel better.  People with depression that is not too severe can get better by taking medicines or talking with a counselor. People with severe depression usually need medicines to get better, and might also need to see a counselor.  Another treatment involves placing a device against the scalp to pass magnetic waves into the brain. This is called \"transcranial magnetic stimulation\" (\"TMS\"). Doctors might suggest TMS if medicines and counseling have not helped.  Some people with severe depression might need a treatment called \"electroconvulsive therapy\" (\"ECT\"). During ECT, doctors pass an electric current through a person's brain in a safe way.  When will I feel better? -- Most treatment options take a little while to start working.   Many people who take medicines start to feel better within 2 weeks, but it might be 4 to 8 weeks before the medicine has its full effect.   Many people who see a counselor start to feel better within a few weeks, but it might " take 8 to 10 weeks to get the greatest benefit.  If the first treatment you try does not help you, tell your doctor or nurse, but do not give up. Some people need to try different treatments or combinations of treatments before they find an approach that works. Your doctor, nurse, or counselor can work with you to find the treatment that is right for you. They can also help you figure out how to cope while you search for the right treatment or are waiting for your treatment to start working.  How do I decide which treatment to have? -- You and your doctor or nurse will need to work together to choose a treatment for you. Medicines might work a little faster than counseling. But medicines can also cause side effects. Plus, some people do not like the idea of taking medicine.  Seeing a counselor involves talking about your feelings. That is also hard for some people.  What if I take medicine for depression and I want to have a baby? -- Some depression medicines can cause problems for an unborn baby. But having untreated depression during pregnancy can also cause problems. If you want to get pregnant, tell your doctor but do not stop taking your medicines. Together, you can plan the safest way for you to have your baby.  It's also important to talk with your doctor if you want to breastfeed after your baby is born. Breastfeeding has lots of benefits for both mother and baby. Some depression medicines are safer than others to use while breastfeeding. But having untreated depression after giving birth can also cause problems, so do not stop taking your medicines. Your doctor can work with you to plan the safest way for you to feed your baby.  All topics are updated as new evidence becomes available and our peer review process is complete.  This topic retrieved from [employer] on: Mar 06, 2024.  Topic 16680 Version 22.0  Release: 32.2.4 - C32.64  © 2024 UpToDate, Inc. and/or its affiliates. All rights reserved.  figure 1:  "Mood disorders caused by problems in the brain     Mooddisorders, such as depression and bipolar disorder, are caused by problems with\"neurotransmitters.\" These are chemicals in the brain that can affect your emotions.Treatments for mood disorders seem to work by changing the levels of certainneurotransmitters.  Graphic 91191 Version 4.0  Consumer Information Use and Disclaimer   Disclaimer: This generalized information is a limited summary of diagnosis, treatment, and/or medication information. It is not meant to be comprehensive and should be used as a tool to help the user understand and/or assess potential diagnostic and treatment options. It does NOT include all information about conditions, treatments, medications, side effects, or risks that may apply to a specific patient. It is not intended to be medical advice or a substitute for the medical advice, diagnosis, or treatment of a health care provider based on the health care provider's examination and assessment of a patient's specific and unique circumstances. Patients must speak with a health care provider for complete information about their health, medical questions, and treatment options, including any risks or benefits regarding use of medications. This information does not endorse any treatments or medications as safe, effective, or approved for treating a specific patient. UpToDate, Inc. and its affiliates disclaim any warranty or liability relating to this information or the use thereof.The use of this information is governed by the Terms of Use, available at https://www.b-datumuwer.com/en/know/clinical-effectiveness-terms. 2024© UpToDate, Inc. and its affiliates and/or licensors. All rights reserved.  Copyright   © 2024 UpToDate, Inc. and/or its affiliates. All rights reserved.    "

## 2025-01-08 NOTE — ASSESSMENT & PLAN NOTE
Orders:  •  Cholecalciferol (Vitamin D3) 50 MCG (2000 UT) TABS; Take 1 tablet (2,000 Units total) by mouth daily

## 2025-01-08 NOTE — PROGRESS NOTES
Adult Annual Physical  Name: Zurdo Cheema      : 1975      MRN: [id number]  Encounter Provider: Bonita Ya PA-C  Encounter Date: 2025   Encounter department: [facility]    Assessment & Plan  Annual physical exam         Severe major depressive disorder (HCC)  Depression Screening Follow-up Plan: Patient's depression screening was positive with a PHQ-9 score of 23. Patient with underlying depression and was advised to continue current medications as prescribed.  -Recommended Wellbutrin, but patient states he has been on this before and di not do well on it  -Will try mirtazapine with his cymbalta   -Had has not responded well to SSRI's in the past   -Did not express SI or HI   -Offered him psych referral for talk therapy, but defers at this point and states he will reach out if he needs it   -Told him that if might take a few months to see good efficacy   -Will follow-up with him in 3 months to see how everything is doing  Orders:  •  DULoxetine (CYMBALTA) 60 mg delayed release capsule; Take 1 capsule (60 mg total) by mouth daily  •  mirtazapine (REMERON) 7.5 MG tablet; Take 1 tablet (7.5 mg total) by mouth daily at bedtime    Ear fullness  -Was seen in the ER for ear pain and bloody discharge   -He states that they told him he might have an acoustic nueroma   -Tried to get into ENT but was not able  -Will give him an ASAP referral to get in as he is still having blood discharge and pain  Orders:  •  ciprofloxacin-dexamethasone (CIPRODEX) otic suspension; Administer 4 drops into both ears 2 (two) times a day  •  Ambulatory Referral to Otolaryngology; Future    Essential hypertension  -BP today 158/98 today  -Patient extremely stressed out  -Will see what BP is in 3 months and make necessary changes  Orders:  •  olmesartan (BENICAR) 5 mg tablet; Take 1 tablet (5 mg total) by mouth daily    Benign prostatic hyperplasia with weak urinary stream  -States has been having  more urgency to go recently   -Has not been having any urinary symptoms such as frequency or painful urination   -Advised to restrict fluid intake a few hours before bed   -Will check PSA and possibly do urinalysis next visit if symptoms persist  Orders:  •  tamsulosin (FLOMAX) 0.4 mg; Take 1 capsule (0.4 mg total) by mouth daily with dinner    Essential hypertriglyceridemia    Orders:  •  fenofibrate (TRICOR) 145 mg tablet; Take 1 tablet (145 mg total) by mouth daily  •  Lipid Panel with Direct LDL reflex; Future    Screening for deficiency anemia    Orders:  •  CBC and differential; Future    Screening for diabetes mellitus    Orders:  •  Comprehensive metabolic panel; Future  •  Hemoglobin A1C; Future    Screening for malignant neoplasm of prostate    Orders:  •  PSA, Total Screen; Future    Screening for thyroid disorder    Orders:  •  TSH, 3rd generation with Free T4 reflex; Future    Gastroesophageal reflux disease without esophagitis    Orders:  •  lansoprazole (PREVACID) 30 mg capsule; Take 1 capsule (30 mg total) by mouth daily    Vitamin D deficiency    Orders:  •  Cholecalciferol (Vitamin D3) 50 MCG (2000 UT) TABS; Take 1 tablet (2,000 Units total) by mouth daily    Immunizations and preventive care screenings were discussed with patient today. Appropriate education was printed on patient's after visit summary.    Discussed risks and benefits of prostate cancer screening. We discussed the controversial history of PSA screening for prostate cancer in the [place] as well as the risk of over detection and over treatment of prostate cancer by way of PSA screening.  The patient understands that PSA blood testing is an imperfect way to screen for prostate cancer and that elevated PSA levels in the blood may also be caused by infection, inflammation, prostatic trauma or manipulation, urological procedures, or by benign prostatic enlargement.    The role of the digital rectal examination in prostate cancer  screening was also discussed and I discussed with him that there is large interobserver variability in the findings of digital rectal examination.    Counseling:  Alcohol/drug use: discussed moderation in alcohol intake, the recommendations for healthy alcohol use, and avoidance of illicit drug use.  Dental Health: discussed importance of regular tooth brushing, flossing, and dental visits.  Injury prevention: discussed safety/seat belts, safety helmets, smoke detectors, carbon monoxide detectors, and smoking near bedding or upholstery.  Sexual health: discussed sexually transmitted diseases, partner selection, use of condoms, avoidance of unintended pregnancy, and contraceptive alternatives.  Exercise: the importance of regular exercise/physical activity was discussed. Recommend exercise 3-5 times per week for at least 30 minutes.       Depression Screening and Follow-up Plan: Patient's depression screening was positive with a PHQ-9 score of 23.   Patient with underlying depression and was advised to continue current medications as prescribed.       History of Present Illness   {?Quick Links Encounters * My Last Note * Last Note in Specialty * Snapshot * Since Last Visit * History :36312}  Adult Annual Physical:  Patient presents for annual physical. Patient is a 49-year-old male who presents today for annual physical. He states that he has felt very depressed and stressed because him and his wife are caring for his mother who has dementia. He states he is also concerned with having the urgency to urinate for the past 2 months. He denies any urinary symptoms. He also has been having blood discharge coming out of his ear that he was seen in the ER for.  .     Diet and Physical Activity:  - Diet/Nutrition: poor diet, frequent junk food and high fat diet.  - Exercise: no formal exercise.    Depression Screening:    - PHQ-9 Score: 23    General Health:  - Sleep: sleeps poorly and 1-3 hours of sleep on average.  -  Hearing: decreased hearing bilateral ears.  - Vision: wears glasses, most recent eye exam > 1 year ago and vision problems.  - Dental: brushes teeth twice daily and regular dental visits.    Review of Systems   Constitutional:  Negative for chills, fatigue and fever.   HENT:  Negative for congestion, ear pain and sore throat.    Eyes:  Negative for pain.   Respiratory:  Negative for cough, chest tightness and shortness of breath.    Cardiovascular:  Positive for leg swelling. Negative for chest pain and palpitations.   Gastrointestinal:  Negative for abdominal pain, constipation, diarrhea, nausea and vomiting.   Genitourinary:  Positive for urgency. Negative for difficulty urinating and dysuria.   Musculoskeletal:  Negative for arthralgias and back pain.   Skin:  Negative for color change and rash.   Neurological:  Negative for syncope and headaches.   Psychiatric/Behavioral:  Positive for sleep disturbance. The patient is nervous/anxious.    All other systems reviewed and are negative.    Current Outpatient Medications on File Prior to Visit   Medication Sig Dispense Refill   • aspirin 81 mg chewable tablet Chew 81 mg daily     • [DISCONTINUED] Cholecalciferol (Vitamin D3) 50 MCG (2000 UT) TABS Take 1 tablet (2,000 Units total) by mouth daily 90 tablet 0   • [DISCONTINUED] ciprofloxacin-dexamethasone (CIPRODEX) otic suspension Administer 4 drops into both ears 2 (two) times a day 7.5 mL 0   • [DISCONTINUED] DULoxetine (CYMBALTA) 60 mg delayed release capsule Take 1 capsule (60 mg total) by mouth daily 90 capsule 1   • [DISCONTINUED] fenofibrate (TRICOR) 145 mg tablet take 1 tablet by mouth daily 90 tablet 1   • [DISCONTINUED] lansoprazole (PREVACID) 30 mg capsule Take 1 capsule (30 mg total) by mouth daily 90 capsule 1   • [DISCONTINUED] olmesartan (BENICAR) 5 mg tablet take 1 tablet by mouth daily 90 tablet 1   • [DISCONTINUED] tamsulosin (FLOMAX) 0.4 mg Take 1 capsule (0.4 mg total) by mouth daily with dinner 90  capsule 1   • [DISCONTINUED] fluticasone (FLONASE) 50 mcg/act nasal spray 2 sprays into each nostril daily 16 g 11     No current facility-administered medications on file prior to visit.        Objective {?Quick Links Trend Vitals * Enter New Vitals * Results Review * Timeline (Adult) * Labs * Imaging * Cardiology * Procedures * Lung Cancer Screening * Surgical eConsent :62007}  /98 (Patient Position: Sitting, Cuff Size: Standard)   Pulse 84   Temp 98 °F (36.7 °C) (Tympanic)   Resp 20   Ht 6' (1.829 m)   Wt 106 kg (234 lb)   SpO2 99%   BMI 31.74 kg/m²     Physical Exam  Vitals and nursing note reviewed.   Constitutional:       General: He is not in acute distress.     Appearance: Normal appearance. He is well-developed.   HENT:      Head: Normocephalic and atraumatic.      Right Ear: Drainage present. Tympanic membrane is erythematous.      Left Ear: Tympanic membrane normal.      Nose: Nose normal.      Mouth/Throat:      Mouth: Mucous membranes are moist.   Eyes:      Conjunctiva/sclera: Conjunctivae normal.   Cardiovascular:      Rate and Rhythm: Normal rate and regular rhythm.      Heart sounds: Normal heart sounds. No murmur heard.  Pulmonary:      Effort: Pulmonary effort is normal. No respiratory distress.      Breath sounds: Normal breath sounds. No wheezing or rhonchi.   Abdominal:      Palpations: Abdomen is soft.      Tenderness: There is no abdominal tenderness.   Musculoskeletal:         General: No swelling.      Cervical back: Neck supple.   Skin:     General: Skin is warm and dry.      Capillary Refill: Capillary refill takes less than 2 seconds.   Neurological:      Mental Status: He is alert and oriented to person, place, and time.   Psychiatric:         Mood and Affect: Mood normal.         Behavior: Behavior normal.         Thought Content: Thought content normal.         Judgment: Judgment normal.       Administrative Statements {?Quick Links Full Problem List * Level of Service *  [facility]}  I have spent a total time of 45 minutes in caring for this patient on the day of the visit/encounter including Diagnostic results, Prognosis, Risks and benefits of tx options, Instructions for management, Patient and family education, Importance of tx compliance, Risk factor reductions, Impressions, Counseling / Coordination of care, Documenting in the medical record, Reviewing / ordering tests, medicine, procedures  , and Obtaining or reviewing history  . Topics discussed with the patient / family include symptom assessment and management and medication review.

## 2025-01-08 NOTE — ASSESSMENT & PLAN NOTE
-BP today 158/98 today  -Patient extremely stressed out  -Will see what BP is in 3 months and make necessary changes  Orders:  •  olmesartan (BENICAR) 5 mg tablet; Take 1 tablet (5 mg total) by mouth daily

## 2025-01-09 ENCOUNTER — TELEPHONE (OUTPATIENT)
Dept: OBGYN CLINIC | Facility: CLINIC | Age: 50
End: 2025-01-09

## 2025-01-09 NOTE — TELEPHONE ENCOUNTER
LM for patient to call [phone], [facility] to see about scheduling an ENT appt since this would be closer for him.

## 2025-02-24 DIAGNOSIS — F32.2 SEVERE MAJOR DEPRESSIVE DISORDER (HCC): Primary | ICD-10-CM

## 2025-02-24 RX ORDER — BUPROPION HYDROCHLORIDE 150 MG/1
150 TABLET ORAL EVERY MORNING
Qty: 30 TABLET | Refills: 1 | Status: SHIPPED | OUTPATIENT
Start: 2025-02-24 | End: 2025-04-25

## 2025-03-18 ENCOUNTER — NURSE TRIAGE (OUTPATIENT)
Age: 50
End: 2025-03-18

## 2025-03-18 NOTE — TELEPHONE ENCOUNTER
"FOLLOW UP: Patient was made appointment for tomorrow morning by PEP before transferred to Nurse,did advise UC/ED for him to be evaluated tonight, he stated he would think about it, wanted to keep appointment for tomorrow morning.    REASON FOR CONVERSATION: Cough and Shortness of Breath    SYMPTOMS: Patient started with cough and congestion on Friday. He has been tired, with fevers body aches and chills since. Last fever this morning was 102.1 F. He states he gets short of breathe just walking around his house. He does not have Pulse ox present. He states that he has a non productive cough, feels as something is \" stuck in his lungs\" but unable to bring up. He also reports when he takes a deep breathe he feels pressure in his lungs in in his chest. He has been taking OTC aleve and cold and flu medication.    OTHER: Advised UC/ED tonight to be evaluated for imaging and to be seen sooner. Pt understood unsure if he will follow through since having appointment made for tomorrow. Please PCP review and reach out with any further recommendations for patient.     DISPOSITION: Go to [facility]Urgent Care        Reason for Disposition   MILD difficulty breathing (e.g., minimal/no SOB at rest, SOB with walking, pulse <100) and still present when not coughing    Answer Assessment - Initial Assessment Questions  1. ONSET: \"When did the cough begin?\"       Started on Friday  2. SEVERITY: \"How bad is the cough today?\"       Hurts to cough, feels a heaviness on his chest when he takes a deep breath  3. SPUTUM: \"Describe the color of your sputum\" (e.g., none, dry cough; clear, white, yellow, green)      Not able to bring anything up, states he feels like he has something \" stuck\" but it wont come out  4. HEMOPTYSIS: \"Are you coughing up any blood?\" If Yes, ask: \"How much?\" (e.g., flecks, streaks, tablespoons, etc.)      no  5. DIFFICULTY BREATHING: \"Are you having difficulty breathing?\" If Yes, ask: \"How bad is it?\" (e.g., mild, " "moderate, severe)       Having trouble breathing, mild/moderate at times. Gets winded walking from living room to kitchen  6. FEVER: \"Do you have a fever?\" If Yes, ask: \"What is your temperature, how was it measured, and when did it start?\"      Yes, fever last recorded this morning 102.1 F    7. CARDIAC HISTORY: \"Do you have any history of heart disease?\" (e.g., heart attack, congestive heart failure)       No history  8. LUNG HISTORY: \"Do you have any history of lung disease?\"  (e.g., pulmonary embolus, asthma, emphysema)      no  9. PE RISK FACTORS: \"Do you have a history of blood clots?\" (or: recent major surgery, recent prolonged travel, bedridden)      No history  10. OTHER SYMPTOMS: \"Do you have any other symptoms?\" (e.g., runny nose, wheezing, chest pain)        No wheezing, no chest pain. Does have have sinus congestion    Protocols used: Cough-Adult-[place]    "

## 2025-03-19 ENCOUNTER — OFFICE VISIT (OUTPATIENT)
Dept: FAMILY MEDICINE CLINIC | Facility: CLINIC | Age: 50
End: 2025-03-19
Payer: COMMERCIAL

## 2025-03-19 VITALS
RESPIRATION RATE: 20 BRPM | HEIGHT: 72 IN | HEART RATE: 97 BPM | WEIGHT: 231 LBS | SYSTOLIC BLOOD PRESSURE: 126 MMHG | TEMPERATURE: 99 F | BODY MASS INDEX: 31.29 KG/M2 | DIASTOLIC BLOOD PRESSURE: 62 MMHG | OXYGEN SATURATION: 92 %

## 2025-03-19 DIAGNOSIS — G44.83 COUGH HEADACHE: ICD-10-CM

## 2025-03-19 DIAGNOSIS — R05.1 ACUTE COUGH: ICD-10-CM

## 2025-03-19 DIAGNOSIS — J06.9 UPPER RESPIRATORY TRACT INFECTION, UNSPECIFIED TYPE: Primary | ICD-10-CM

## 2025-03-19 LAB
S PYO AG THROAT QL: NEGATIVE
SARS-COV-2 AG UPPER RESP QL IA: NEGATIVE
SL AMB POCT RAPID FLU A: NORMAL
SL AMB POCT RAPID FLU B: NORMAL
VALID CONTROL: NORMAL

## 2025-03-19 PROCEDURE — 87804 INFLUENZA ASSAY W/OPTIC: CPT

## 2025-03-19 PROCEDURE — 87880 STREP A ASSAY W/OPTIC: CPT

## 2025-03-19 PROCEDURE — 87811 SARS-COV-2 COVID19 W/OPTIC: CPT

## 2025-03-19 PROCEDURE — 99213 OFFICE O/P EST LOW 20 MIN: CPT

## 2025-03-19 RX ORDER — AZITHROMYCIN 250 MG/1
TABLET, FILM COATED ORAL
Qty: 6 TABLET | Refills: 0 | Status: SHIPPED | OUTPATIENT
Start: 2025-03-19 | End: 2025-03-23

## 2025-03-19 RX ORDER — PREDNISONE 10 MG/1
TABLET ORAL
Qty: 30 TABLET | Refills: 0 | Status: SHIPPED | OUTPATIENT
Start: 2025-03-19 | End: 2025-03-31

## 2025-03-19 RX ORDER — BENZONATATE 100 MG/1
100 CAPSULE ORAL 3 TIMES DAILY PRN
Qty: 30 CAPSULE | Refills: 0 | Status: SHIPPED | OUTPATIENT
Start: 2025-03-19

## 2025-03-19 NOTE — PROGRESS NOTES
Name: Zurdo Cheema      : 1975      MRN: [id number]  Encounter Provider: Bonita Ya PA-C  Encounter Date: 3/19/2025   Encounter department: [facility] CARE  :  Assessment & Plan  Upper respiratory tract infection, unspecified type  -Prescribed azithromycin and prednisone due to him being a heavy smoker  -Advised patient to take antibiotic with food and probiotic (Activia) to avoid GI upset  -Advised patient to finish antibiotics even if feeling better   -Patient welcome to come back at any time if not feeling better/worsening   -Advised that if he develops worsening shortness of breath or chest pain, to go to the ER immediately    Orders:    predniSONE 10 mg tablet; Take 4 tablets (40 mg total) by mouth daily for 3 days, THEN 3 tablets (30 mg total) daily for 3 days, THEN 2 tablets (20 mg total) daily for 3 days, THEN 1 tablet (10 mg total) daily for 3 days.    azithromycin (ZITHROMAX) 250 mg tablet; Take 2 tablets by mouth once a day on the first day, then one tablet by mouth once a day for the next 4 days    Acute cough    Orders:    benzonatate (TESSALON PERLES) 100 mg capsule; Take 1 capsule (100 mg total) by mouth 3 (three) times a day as needed for cough    Cough headache  -All POCT tests negative   Orders:    POCT rapid flu A and B    POCT Rapid Covid Ag    POCT rapid ANTIGEN strepA           History of Present Illness   Patient is a 49-year-old male who presents today for flu-like symptoms. He states that Thursday night into Friday he started with body aches, 102 fever, congestion, ear pain, sore throat, diarrhea and cough. He says that he feels like he has stuff in his chest that he can't get out. He woke up this morning and his temperature was 100 but says it goes up and down everyday. He has tried [place] and Aleve with minimal relief. He denies any pleuritic chest pain, calf pain. He states he feels very short of breath.            Review of Systems    Constitutional:  Positive for fatigue and fever. Negative for chills.   HENT:  Positive for congestion, ear pain, rhinorrhea, sinus pressure and sore throat.    Eyes:  Negative for pain.   Respiratory:  Positive for cough and shortness of breath. Negative for chest tightness.    Cardiovascular:  Negative for chest pain, palpitations and leg swelling.   Gastrointestinal:  Positive for diarrhea. Negative for abdominal pain, constipation, nausea and vomiting.   Genitourinary:  Negative for difficulty urinating and dysuria.   Musculoskeletal:  Positive for myalgias. Negative for arthralgias and back pain.   Skin:  Negative for color change and rash.   Neurological:  Positive for headaches. Negative for syncope.   All other systems reviewed and are negative.      Objective   /62 (Patient Position: Sitting, Cuff Size: Large)   Pulse 97   Temp 99 °F (37.2 °C) (Tympanic)   Resp 20   Ht 6' (1.829 m)   Wt 105 kg (231 lb)   SpO2 92%   BMI 31.33 kg/m²      Physical Exam  Vitals and nursing note reviewed.   Constitutional:       General: He is not in acute distress.     Appearance: Normal appearance. He is well-developed. He is ill-appearing.   HENT:      Head: Normocephalic and atraumatic.      Right Ear: Tympanic membrane is bulging.      Left Ear: Tympanic membrane is bulging.      Nose: Rhinorrhea present.      Mouth/Throat:      Mouth: Mucous membranes are moist.      Pharynx: Posterior oropharyngeal erythema present.   Eyes:      Conjunctiva/sclera: Conjunctivae normal.   Cardiovascular:      Rate and Rhythm: Normal rate and regular rhythm.      Heart sounds: Normal heart sounds. No murmur heard.     No friction rub.   Pulmonary:      Effort: Pulmonary effort is normal. No respiratory distress.      Breath sounds: Normal breath sounds. No wheezing or rhonchi.   Chest:      Chest wall: No tenderness.   Abdominal:      Palpations: Abdomen is soft.      Tenderness: There is no abdominal tenderness.    Musculoskeletal:         General: No swelling.      Cervical back: Neck supple.   Skin:     General: Skin is warm and dry.      Capillary Refill: Capillary refill takes less than 2 seconds.   Neurological:      Mental Status: He is alert and oriented to person, place, and time.   Psychiatric:         Mood and Affect: Mood normal.         Behavior: Behavior normal.         Thought Content: Thought content normal.         Judgment: Judgment normal.       Administrative Statements   I have spent a total time of 20 minutes in caring for this patient on the day of the visit/encounter including Diagnostic results, Prognosis, Risks and benefits of tx options, Instructions for management, Patient and family education, Importance of tx compliance, Risk factor reductions, Impressions, Counseling / Coordination of care, Documenting in the medical record, Reviewing/placing orders in the medical record (including tests, medications, and/or procedures), and Obtaining or reviewing history  .

## 2025-04-17 DIAGNOSIS — F32.2 SEVERE MAJOR DEPRESSIVE DISORDER (HCC): ICD-10-CM

## 2025-04-18 RX ORDER — BUPROPION HYDROCHLORIDE 150 MG/1
150 TABLET ORAL EVERY MORNING
Qty: 30 TABLET | Refills: 1 | Status: SHIPPED | OUTPATIENT
Start: 2025-04-18 | End: 2025-04-22

## 2025-04-22 ENCOUNTER — OFFICE VISIT (OUTPATIENT)
Dept: FAMILY MEDICINE CLINIC | Facility: CLINIC | Age: 50
End: 2025-04-22
Payer: COMMERCIAL

## 2025-04-22 VITALS
DIASTOLIC BLOOD PRESSURE: 80 MMHG | BODY MASS INDEX: 31.37 KG/M2 | WEIGHT: 231.6 LBS | TEMPERATURE: 96.7 F | SYSTOLIC BLOOD PRESSURE: 138 MMHG | OXYGEN SATURATION: 100 % | HEIGHT: 72 IN | HEART RATE: 87 BPM

## 2025-04-22 DIAGNOSIS — F41.9 ANXIETY AND DEPRESSION: Primary | ICD-10-CM

## 2025-04-22 DIAGNOSIS — F17.210 SMOKING GREATER THAN 20 PACK YEARS: ICD-10-CM

## 2025-04-22 DIAGNOSIS — F32.A ANXIETY AND DEPRESSION: Primary | ICD-10-CM

## 2025-04-22 DIAGNOSIS — G43.919 INTRACTABLE MIGRAINE WITHOUT STATUS MIGRAINOSUS, UNSPECIFIED MIGRAINE TYPE: ICD-10-CM

## 2025-04-22 PROCEDURE — 99213 OFFICE O/P EST LOW 20 MIN: CPT

## 2025-04-22 RX ORDER — SUMATRIPTAN SUCCINATE 25 MG/1
25 TABLET ORAL ONCE AS NEEDED
Qty: 10 TABLET | Refills: 0 | Status: SHIPPED | OUTPATIENT
Start: 2025-04-22 | End: 2025-04-30

## 2025-04-22 RX ORDER — BUPROPION HYDROCHLORIDE 300 MG/1
300 TABLET ORAL EVERY MORNING
Qty: 30 TABLET | Refills: 1 | Status: SHIPPED | OUTPATIENT
Start: 2025-04-22 | End: 2025-06-21

## 2025-04-22 NOTE — PROGRESS NOTES
Name: Zurdo Cheema      : 1975      MRN: [id number]  Encounter Provider: Bonita Ya PA-C  Encounter Date: 2025   Encounter department: [facility]  :  Assessment & Plan  Anxiety and depression  His Wellbutrin has helped him cut down on the amount of cigarettes that he is smoking daily.  He states that he is unsure if this is helping with his depression or not.  Will increase his Wellbutrin to 300 mg daily and see if this helps with his depression  Patient agreeable.  If dose is too high he will cut back to 150 mg/day    Orders:    buPROPion (WELLBUTRIN XL) 300 mg 24 hr tablet; Take 1 tablet (300 mg total) by mouth every morning    Intractable migraine without status migrainosus, unspecified migraine type  Ordered Imitrex for him to take as soon as he feels a migraine coming on.  Advised him that if the migraine does not resolve completely he can take another dose 2 hours later.  Patient has been taking 500 mg of Tylenol every 6 hours which has not been helping  Advised we can always increase dose of the Imitrex as needed  Advise he should rest in a quiet/dark place.  Orders:    SUMAtriptan (IMITREX) 25 mg tablet; Take 1 tablet (25 mg total) by mouth once as needed for migraine for up to 1 dose    Smoking greater than 20 pack years    Orders:    CT lung screening program; Future           History of Present Illness   Patient is a 49-year-old male who presents today for 3-month follow-up.  He states that ever since he was switched from his mirtazapine to Wellbutrin he has been feeling better, even smoking less.  He says the mirtazapine was making him extremely tired and he was unable to take it, but the Wellbutrin has been working more for the smoking cessation rather than the depression.  He has been continuing to care for his mother at home who has severe dementia and he states is declining and that has been putting extreme stress on him.    He also states that he  has been dealing with multiple migraines.  He states that he has been nauseous and sensitive to light during these episodes.  Unsure if is related to stress.      Review of Systems   Constitutional:  Negative for chills, fatigue and fever.   HENT:  Negative for congestion, ear pain and sore throat.    Eyes:  Negative for pain.   Respiratory:  Negative for cough, chest tightness and shortness of breath.    Cardiovascular:  Negative for chest pain and palpitations.   Gastrointestinal:  Negative for abdominal pain, constipation, diarrhea, nausea and vomiting.   Genitourinary:  Negative for difficulty urinating and dysuria.   Musculoskeletal:  Negative for arthralgias and back pain.   Skin:  Negative for color change and rash.   Neurological:  Positive for headaches. Negative for syncope.   Psychiatric/Behavioral:  Positive for sleep disturbance.    All other systems reviewed and are negative.      Objective   /80   Pulse 87   Temp (!) 96.7 °F (35.9 °C)   Ht 6' (1.829 m)   Wt 105 kg (231 lb 9.6 oz)   SpO2 100%   BMI 31.41 kg/m²      Physical Exam  Vitals and nursing note reviewed.   Constitutional:       General: He is not in acute distress.     Appearance: Normal appearance. He is well-developed.   HENT:      Head: Normocephalic and atraumatic.      Right Ear: Tympanic membrane normal.      Left Ear: Tympanic membrane normal.      Nose: Nose normal.      Mouth/Throat:      Mouth: Mucous membranes are moist.   Eyes:      Conjunctiva/sclera: Conjunctivae normal.   Cardiovascular:      Rate and Rhythm: Normal rate and regular rhythm.      Heart sounds: Normal heart sounds. No murmur heard.  Pulmonary:      Effort: Pulmonary effort is normal. No respiratory distress.      Breath sounds: Normal breath sounds. No wheezing or rhonchi.   Abdominal:      Palpations: Abdomen is soft.      Tenderness: There is no abdominal tenderness.   Musculoskeletal:         General: No swelling.      Cervical back: Neck supple.    Skin:     General: Skin is warm and dry.      Capillary Refill: Capillary refill takes less than 2 seconds.   Neurological:      Mental Status: He is alert and oriented to person, place, and time.   Psychiatric:         Mood and Affect: Mood normal.         Behavior: Behavior normal.         Thought Content: Thought content normal.         Judgment: Judgment normal.       Administrative Statements   I have spent a total time of 20 minutes in caring for this patient on the day of the visit/encounter including Diagnostic results, Prognosis, Risks and benefits of tx options, Instructions for management, Patient and family education, Importance of tx compliance, Risk factor reductions, Impressions, Counseling / Coordination of care, Documenting in the medical record, Reviewing/placing orders in the medical record (including tests, medications, and/or procedures), and Obtaining or reviewing history  .

## 2025-04-22 NOTE — ASSESSMENT & PLAN NOTE
His Wellbutrin has helped him cut down on the amount of cigarettes that he is smoking daily.  He states that he is unsure if this is helping with his depression or not.  Will increase his Wellbutrin to 300 mg daily and see if this helps with his depression  Patient agreeable.  If dose is too high he will cut back to 150 mg/day    Orders:    buPROPion (WELLBUTRIN XL) 300 mg 24 hr tablet; Take 1 tablet (300 mg total) by mouth every morning

## 2025-04-28 DIAGNOSIS — R39.12 BENIGN PROSTATIC HYPERPLASIA WITH WEAK URINARY STREAM: ICD-10-CM

## 2025-04-28 DIAGNOSIS — N40.1 BENIGN PROSTATIC HYPERPLASIA WITH WEAK URINARY STREAM: ICD-10-CM

## 2025-04-29 RX ORDER — TAMSULOSIN HYDROCHLORIDE 0.4 MG/1
0.4 CAPSULE ORAL
Qty: 90 CAPSULE | Refills: 1 | Status: SHIPPED | OUTPATIENT
Start: 2025-04-29

## 2025-04-30 ENCOUNTER — TELEPHONE (OUTPATIENT)
Dept: FAMILY MEDICINE CLINIC | Facility: CLINIC | Age: 50
End: 2025-04-30

## 2025-04-30 DIAGNOSIS — G43.919 INTRACTABLE MIGRAINE WITHOUT STATUS MIGRAINOSUS, UNSPECIFIED MIGRAINE TYPE: ICD-10-CM

## 2025-04-30 RX ORDER — SUMATRIPTAN 50 MG/1
50 TABLET, FILM COATED ORAL ONCE AS NEEDED
Qty: 9 TABLET | Refills: 0 | Status: SHIPPED | OUTPATIENT
Start: 2025-04-30

## 2025-04-30 NOTE — TELEPHONE ENCOUNTER
Patient called the refill line wanting to see if the doctor can send in a new script for his imitrex for an increase in dose. Patient has already used the previous script that was sent on 04/22/25.

## 2025-05-10 DIAGNOSIS — K21.9 GASTROESOPHAGEAL REFLUX DISEASE WITHOUT ESOPHAGITIS: ICD-10-CM

## 2025-05-10 DIAGNOSIS — G43.919 INTRACTABLE MIGRAINE WITHOUT STATUS MIGRAINOSUS, UNSPECIFIED MIGRAINE TYPE: ICD-10-CM

## 2025-05-11 RX ORDER — LANSOPRAZOLE 30 MG/1
30 CAPSULE, DELAYED RELEASE ORAL DAILY
Qty: 90 CAPSULE | Refills: 1 | Status: SHIPPED | OUTPATIENT
Start: 2025-05-11

## 2025-05-11 RX ORDER — SUMATRIPTAN 50 MG/1
TABLET, FILM COATED ORAL
Qty: 9 TABLET | Refills: 2 | Status: SHIPPED | OUTPATIENT
Start: 2025-05-11

## 2025-05-19 DIAGNOSIS — F41.9 ANXIETY AND DEPRESSION: ICD-10-CM

## 2025-05-19 DIAGNOSIS — F32.A ANXIETY AND DEPRESSION: ICD-10-CM

## 2025-05-19 DIAGNOSIS — H93.8X9 EAR FULLNESS: ICD-10-CM

## 2025-05-19 DIAGNOSIS — G43.919 INTRACTABLE MIGRAINE WITHOUT STATUS MIGRAINOSUS, UNSPECIFIED MIGRAINE TYPE: ICD-10-CM

## 2025-05-20 RX ORDER — SUMATRIPTAN 50 MG/1
50 TABLET, FILM COATED ORAL ONCE AS NEEDED
Qty: 9 TABLET | Refills: 0 | OUTPATIENT
Start: 2025-05-20

## 2025-05-20 RX ORDER — BUPROPION HYDROCHLORIDE 300 MG/1
300 TABLET ORAL EVERY MORNING
Qty: 30 TABLET | Refills: 5 | Status: SHIPPED | OUTPATIENT
Start: 2025-05-20 | End: 2025-07-19

## 2025-05-20 RX ORDER — CIPROFLOXACIN AND DEXAMETHASONE 3; 1 MG/ML; MG/ML
4 SUSPENSION/ DROPS AURICULAR (OTIC) 2 TIMES DAILY
Qty: 7.5 ML | Refills: 0 | Status: SHIPPED | OUTPATIENT
Start: 2025-05-20

## 2025-06-12 ENCOUNTER — TELEMEDICINE (OUTPATIENT)
Dept: FAMILY MEDICINE CLINIC | Facility: CLINIC | Age: 50
End: 2025-06-12
Payer: COMMERCIAL

## 2025-06-12 DIAGNOSIS — J01.10 ACUTE NON-RECURRENT FRONTAL SINUSITIS: Primary | ICD-10-CM

## 2025-06-12 PROCEDURE — 99213 OFFICE O/P EST LOW 20 MIN: CPT

## 2025-06-12 RX ORDER — PREDNISONE 10 MG/1
TABLET ORAL
Qty: 30 TABLET | Refills: 0 | Status: SHIPPED | OUTPATIENT
Start: 2025-06-12 | End: 2025-06-24

## 2025-06-12 RX ORDER — AZITHROMYCIN 250 MG/1
TABLET, FILM COATED ORAL
Qty: 6 TABLET | Refills: 0 | Status: SHIPPED | OUTPATIENT
Start: 2025-06-12 | End: 2025-06-17

## 2025-06-12 NOTE — PROGRESS NOTES
Virtual Regular Visit  Name: Zurdo Cheema      : 1975      MRN: [id number]  Encounter Provider: Bonita Ya PA-C  Encounter Date: 2025   Encounter department: [facility]  :  Assessment & Plan  Acute non-recurrent frontal sinusitis  -Prescribed azithromycin and prednisone  -Advised patient to take antibiotic with food and probiotic (Activia) to avoid GI upset  -Advised patient to finish antibiotics even if feeling better   -Patient welcome to come back at any time if not feeling better/worsening     Orders:    azithromycin (Zithromax) 250 mg tablet; Take 2 tablets (500 mg total) by mouth daily for 1 day, THEN 1 tablet (250 mg total) daily for 4 days.    predniSONE 10 mg tablet; Take 4 tablets (40 mg total) by mouth daily for 3 days, THEN 3 tablets (30 mg total) daily for 3 days, THEN 2 tablets (20 mg total) daily for 3 days, THEN 1 tablet (10 mg total) daily for 3 days.        History of Present Illness     Patient is a 49-year-old male who presents today for sick visit.  Patient states that about 5 days ago he woke up with a sore throat and congestion.  Yesterday he states that he woke up and his whole face was swollen and his nose was extremely congested.  He states he did have a fever yesterday in which she had to take Tylenol which did bring the fever down.  Patient is also been having a dry cough and ear pain.  His wife has had similar symptoms and he does have a mother who he takes care of and is worried about getting her sick.              Review of Systems   Constitutional:  Positive for fatigue and fever. Negative for chills.   HENT:  Positive for congestion, ear pain, rhinorrhea, sinus pressure, sinus pain and sore throat.    Eyes:  Negative for pain.   Respiratory:  Positive for cough. Negative for chest tightness and shortness of breath.    Cardiovascular:  Negative for chest pain and palpitations.   Gastrointestinal:  Negative for abdominal pain,  constipation, diarrhea, nausea and vomiting.   Genitourinary:  Negative for difficulty urinating and dysuria.   Musculoskeletal:  Negative for arthralgias and back pain.   Skin:  Negative for color change and rash.   Neurological:  Negative for syncope and headaches.   All other systems reviewed and are negative.      Objective   There were no vitals taken for this visit.    Physical Exam  Constitutional:       General: He is not in acute distress.     Appearance: Normal appearance.   Pulmonary:      Effort: No respiratory distress.     Musculoskeletal:      Cervical back: Normal range of motion.     Neurological:      General: No focal deficit present.      Mental Status: He is alert and oriented to person, place, and time.     Psychiatric:         Mood and Affect: Mood normal.         Behavior: Behavior normal.         Thought Content: Thought content normal.         Judgment: Judgment normal.         Administrative Statements   Encounter provider Bonita Ya PA-C    The Patient is located at Home and in the following state in which I hold an active license [place].    The patient was identified by name and date of birth. Zurdo Cheema was informed that this is a telemedicine visit and that the visit is being conducted through the Epic Embedded platform. He agrees to proceed..  My office door was closed. No one else was in the room.  He acknowledged consent and understanding of privacy and security of the video platform. The patient has agreed to participate and understands they can discontinue the visit at any time.    I have spent a total time of 20 minutes in caring for this patient on the day of the visit/encounter including Diagnostic results, Prognosis, Risks and benefits of tx options, Instructions for management, Patient and family education, Importance of tx compliance, Risk factor reductions, Impressions, Counseling / Coordination of care, Documenting in the medical record, Reviewing/placing orders  in the medical record (including tests, medications, and/or procedures), and Obtaining or reviewing history  , not including the time spent for establishing the audio/video connection.

## 2025-06-20 DIAGNOSIS — R39.12 BENIGN PROSTATIC HYPERPLASIA WITH WEAK URINARY STREAM: ICD-10-CM

## 2025-06-20 DIAGNOSIS — F32.2 SEVERE MAJOR DEPRESSIVE DISORDER (HCC): ICD-10-CM

## 2025-06-20 DIAGNOSIS — F41.9 ANXIETY AND DEPRESSION: ICD-10-CM

## 2025-06-20 DIAGNOSIS — F32.A ANXIETY AND DEPRESSION: ICD-10-CM

## 2025-06-20 DIAGNOSIS — N40.1 BENIGN PROSTATIC HYPERPLASIA WITH WEAK URINARY STREAM: ICD-10-CM

## 2025-06-20 DIAGNOSIS — G43.919 INTRACTABLE MIGRAINE WITHOUT STATUS MIGRAINOSUS, UNSPECIFIED MIGRAINE TYPE: ICD-10-CM

## 2025-06-20 DIAGNOSIS — K21.9 GASTROESOPHAGEAL REFLUX DISEASE WITHOUT ESOPHAGITIS: ICD-10-CM

## 2025-06-22 RX ORDER — DULOXETIN HYDROCHLORIDE 60 MG/1
60 CAPSULE, DELAYED RELEASE ORAL DAILY
Qty: 90 CAPSULE | Refills: 0 | Status: SHIPPED | OUTPATIENT
Start: 2025-06-22

## 2025-06-22 RX ORDER — SUMATRIPTAN 50 MG/1
50 TABLET, FILM COATED ORAL ONCE AS NEEDED
Qty: 9 TABLET | Refills: 0 | Status: SHIPPED | OUTPATIENT
Start: 2025-06-22

## 2025-06-22 RX ORDER — LANSOPRAZOLE 30 MG/1
30 CAPSULE, DELAYED RELEASE ORAL DAILY
Qty: 90 CAPSULE | Refills: 0 | Status: SHIPPED | OUTPATIENT
Start: 2025-06-22

## 2025-06-22 RX ORDER — TAMSULOSIN HYDROCHLORIDE 0.4 MG/1
0.4 CAPSULE ORAL
Qty: 90 CAPSULE | Refills: 0 | Status: SHIPPED | OUTPATIENT
Start: 2025-06-22

## 2025-06-22 RX ORDER — BUPROPION HYDROCHLORIDE 300 MG/1
300 TABLET ORAL EVERY MORNING
Qty: 30 TABLET | Refills: 0 | Status: SHIPPED | OUTPATIENT
Start: 2025-06-22 | End: 2025-08-21

## 2025-07-16 ENCOUNTER — OFFICE VISIT (OUTPATIENT)
Dept: URGENT CARE | Facility: CLINIC | Age: 50
End: 2025-07-16
Payer: COMMERCIAL

## 2025-07-16 VITALS
OXYGEN SATURATION: 98 % | SYSTOLIC BLOOD PRESSURE: 140 MMHG | TEMPERATURE: 97.7 F | RESPIRATION RATE: 20 BRPM | DIASTOLIC BLOOD PRESSURE: 70 MMHG | HEART RATE: 95 BPM

## 2025-07-16 DIAGNOSIS — J34.89 NASAL SORE: Primary | ICD-10-CM

## 2025-07-16 DIAGNOSIS — F41.9 ANXIETY AND DEPRESSION: ICD-10-CM

## 2025-07-16 DIAGNOSIS — F32.A ANXIETY AND DEPRESSION: ICD-10-CM

## 2025-07-16 DIAGNOSIS — J01.80 OTHER ACUTE SINUSITIS, RECURRENCE NOT SPECIFIED: ICD-10-CM

## 2025-07-16 PROCEDURE — 87186 SC STD MICRODIL/AGAR DIL: CPT | Performed by: NURSE PRACTITIONER

## 2025-07-16 PROCEDURE — 99214 OFFICE O/P EST MOD 30 MIN: CPT | Performed by: NURSE PRACTITIONER

## 2025-07-16 PROCEDURE — 87529 HSV DNA AMP PROBE: CPT | Performed by: NURSE PRACTITIONER

## 2025-07-16 PROCEDURE — 87205 SMEAR GRAM STAIN: CPT | Performed by: NURSE PRACTITIONER

## 2025-07-16 PROCEDURE — 87077 CULTURE AEROBIC IDENTIFY: CPT | Performed by: NURSE PRACTITIONER

## 2025-07-16 PROCEDURE — 87070 CULTURE OTHR SPECIMN AEROBIC: CPT | Performed by: NURSE PRACTITIONER

## 2025-07-16 RX ORDER — MUPIROCIN 2 %
OINTMENT (GRAM) TOPICAL 3 TIMES DAILY
Qty: 15 G | Refills: 0 | Status: SHIPPED | OUTPATIENT
Start: 2025-07-16 | End: 2025-07-26

## 2025-07-16 RX ORDER — SULFAMETHOXAZOLE AND TRIMETHOPRIM 800; 160 MG/1; MG/1
1 TABLET ORAL EVERY 12 HOURS SCHEDULED
Qty: 20 TABLET | Refills: 0 | Status: SHIPPED | OUTPATIENT
Start: 2025-07-16 | End: 2025-07-26

## 2025-07-16 NOTE — PATIENT INSTRUCTIONS
You have two cultures pending. One for bacteria and one for virus.   You have been prescribed bactrim DS for infection.  You have been prescribed an antibiotic - you are to take an oral probiotic and eat yogurt to avoid GI issues/diarrhea.  The antibiotic you  have been prescribed causes sensitivity to the sun.  Wear sun screen and a hat.     You are to use the ointment as prescribed.  Put some of it in the nose.  Take zyrtec at night to help dry up secretions.     Call ENT for appointment ASAP    Follow up with your PCP in 3-5 days  Go to the ED if symptoms worsen

## 2025-07-16 NOTE — PROGRESS NOTES
[facility]  Name: Zurdo Cheema      : 1975      MRN: [id number]  Encounter Provider: JOCE Haines  Encounter Date: 2025   Encounter department: [facility]  :  Assessment & Plan  Nasal sore    Orders:    sulfamethoxazole-trimethoprim (BACTRIM DS) 800-160 mg per tablet; Take 1 tablet by mouth every 12 (twelve) hours for 10 days    mupirocin (BACTROBAN) 2 % ointment; Apply topically 3 (three) times a day for 10 days    Wound culture and Gram stain; Future    HSV TYPE 1,2 DNA PCR [facility] ONLY; Future    Other acute sinusitis, recurrence not specified    Orders:    sulfamethoxazole-trimethoprim (BACTRIM DS) 800-160 mg per tablet; Take 1 tablet by mouth every 12 (twelve) hours for 10 days    mupirocin (BACTROBAN) 2 % ointment; Apply topically 3 (three) times a day for 10 days    Wound culture and Gram stain; Future    HSV TYPE 1,2 DNA PCR [facility] ONLY; Future        Patient Instructions  Follow up with PCP in 3-5 days.  Proceed to  ER if symptoms worsen.    If tests are performed, our office will contact you with results only if changes need to made to the care plan discussed with you at the visit. [facility]      You have two cultures pending. One for bacteria and one for virus.   You have been prescribed bactrim DS for infection.  You have been prescribed an antibiotic - you are to take an oral probiotic and eat yogurt to avoid GI issues/diarrhea.  The antibiotic you  have been prescribed causes sensitivity to the sun.  Wear sun screen and a hat.     You are to use the ointment as prescribed.  Put some of it in the nose.  Take zyrtec at night to help dry up secretions.     Call ENT for appointment ASAP    Follow up with your PCP in 3-5 days  Go to the ED if symptoms worsen       Chief Complaint:   Chief Complaint   Patient presents with    Sinusitis     2 weeks       History of Present Illness   This is  a 49 year old male who comes to care now with c/o right nasal sore for about 2 weeks. He states that he saw ENT in March and had been on prednisone for nasal congestion at that time. He is to have 2 nasal surgeries but has not been scheduled. He was to have an MRI due to right ear pain but was told by ENT to hold off per pt.  He denies fevers, chills but states has a headache and right sided facial pain. States unable to blow nose due to congestion.  PMH is listed and reviewed.    Pt states had a rash on his lower back about 1 month ago but it wasn't painful just itchy. He does state his mother passed away about 1 month ago and they are having her memorial service this weekend.  States has been under some stress.  Denies current rash.     Sinusitis  Associated symptoms include congestion and ear pain.         Review of Systems   Constitutional: Negative.    HENT:  Positive for congestion, ear pain and rhinorrhea.         Right nasal sore    Eyes: Negative.    Respiratory: Negative.     Cardiovascular: Negative.    Gastrointestinal: Negative.    Endocrine: Negative.    Genitourinary: Negative.    Musculoskeletal: Negative.    Skin: Negative.    Allergic/Immunologic: Negative.    Neurological: Negative.    Hematological: Negative.    Psychiatric/Behavioral: Negative.       Past Medical History   Past Medical History[1]  Past Surgical History[2]  Family History[3]  he reports that he has been smoking cigarettes. He has a 37.5 pack-year smoking history. He has never used smokeless tobacco. He reports current alcohol use. He reports current drug use. Drug: Marijuana.  Current Outpatient Medications   Medication Instructions    benzonatate (TESSALON PERLES) 100 mg, Oral, 3 times daily PRN    buPROPion (WELLBUTRIN XL) 300 mg, Oral, Every morning    ciprofloxacin-dexamethasone (CIPRODEX) otic suspension 4 drops, Both Ears, 2 times daily    DULoxetine (CYMBALTA) 60 mg, Oral, Daily    fenofibrate (TRICOR) 145 mg, Oral, Daily     fluticasone (FLONASE) 50 mcg/act nasal spray 2 sprays, Nasal, Daily    lansoprazole (PREVACID) 30 mg, Oral, Daily    mupirocin (BACTROBAN) 2 % ointment Topical, 3 times daily    olmesartan (BENICAR) 5 mg, Oral, Daily    sulfamethoxazole-trimethoprim (BACTRIM DS) 800-160 mg per tablet 1 tablet, Oral, Every 12 hours scheduled    SUMAtriptan (IMITREX) 50 mg, Oral, Once as needed    tamsulosin (FLOMAX) 0.4 mg, Oral, Daily with dinner   Allergies[4]     Objective   /70   Pulse 95   Temp 97.7 °F (36.5 °C)   Resp 20   SpO2 98%      Physical Exam  Vitals and nursing note reviewed.   Constitutional:       General: He is not in acute distress.     Appearance: Normal appearance. He is obese. He is not ill-appearing, toxic-appearing or diaphoretic.   HENT:      Head: Normocephalic and atraumatic.      Right Ear: Tympanic membrane and ear canal normal.      Ears:      Comments: Left TM canal slightly swollen - pain with otoscope insertion. Pt states this is chronic and ENT was working it up      Nose: Congestion and rhinorrhea present.        Mouth/Throat:      Mouth: Mucous membranes are moist.      Pharynx: No oropharyngeal exudate or posterior oropharyngeal erythema.     Eyes:      Extraocular Movements: Extraocular movements intact.       Cardiovascular:      Rate and Rhythm: Normal rate and regular rhythm.      Pulses: Normal pulses.      Heart sounds: Normal heart sounds.   Pulmonary:      Effort: Pulmonary effort is normal.      Breath sounds: Normal breath sounds.     Musculoskeletal:         General: Normal range of motion.      Cervical back: Normal range of motion and neck supple.     Skin:     General: Skin is warm and dry.      Capillary Refill: Capillary refill takes less than 2 seconds.      Findings: Lesion present.     Neurological:      General: No focal deficit present.      Mental Status: He is alert and oriented to person, place, and time.     Psychiatric:         Mood and Affect: Mood normal.    "      Behavior: Behavior normal.         Thought Content: Thought content normal.         Judgment: Judgment normal.         Portions of the record may have been created with voice recognition software.  Occasional wrong word or \"sound a like\" substitutions may have occurred due to the inherent limitations of voice recognition software.  Read the chart carefully and recognize, using context, where substitutions have occurred.       [1]   Past Medical History:  Diagnosis Date    AC separation, right, sequela     Anxiety     Chest pain     Chronic arm pain     Chronic back pain     Colon polyp     GERD (gastroesophageal reflux disease)     Hyperlipidemia     Hypertension     Vitamin D deficiency    [2]   Past Surgical History:  Procedure Laterality Date    CARPAL TUNNEL RELEASE      COLONOSCOPY      NEUROPLASTY / TRANSPOSITION MEDIAN NERVE AT CARPAL TUNNEL      TENDON REPAIR      Extensor    WISDOM TOOTH EXTRACTION     [3]   Family History  Problem Relation Name Age of Onset    No Known Problems Mother      Diabetes Father          Mellitus    Diabetes Paternal Uncle     [4]   Allergies  Allergen Reactions    Other      Annotation - [id number]: Wood Mites     "

## 2025-07-17 ENCOUNTER — VBI (OUTPATIENT)
Dept: ADMINISTRATIVE | Facility: OTHER | Age: 50
End: 2025-07-17

## 2025-07-17 LAB
HSV1 DNA SPEC QL NAA+PROBE: NOT DETECTED
HSV1 DNA SPEC QL NAA+PROBE: NOT DETECTED

## 2025-07-17 RX ORDER — BUPROPION HYDROCHLORIDE 300 MG/1
300 TABLET ORAL EVERY MORNING
Qty: 90 TABLET | Refills: 1 | Status: SHIPPED | OUTPATIENT
Start: 2025-07-17 | End: 2025-09-15

## 2025-07-17 NOTE — TELEPHONE ENCOUNTER
07/17/25 12:12 PM    Patient was called after the Urgent Care visit Patient declined to schedule appointment.    For F/U BP    Waiting for culture results to come back    Thank you.  Patricia Armstrong MA  [facility]

## 2025-07-17 NOTE — TELEPHONE ENCOUNTER
Blood pressure elevated  Appointment department: [facility]  Appointment provider: JOCE Haines  Blood pressure   07/16/25 1530 140/70   07/16/25 1519 142/71

## 2025-07-18 DIAGNOSIS — G43.919 INTRACTABLE MIGRAINE WITHOUT STATUS MIGRAINOSUS, UNSPECIFIED MIGRAINE TYPE: ICD-10-CM

## 2025-07-18 RX ORDER — SUMATRIPTAN 50 MG/1
50 TABLET, FILM COATED ORAL ONCE AS NEEDED
Qty: 9 TABLET | Refills: 2 | Status: SHIPPED | OUTPATIENT
Start: 2025-07-18

## 2025-07-19 LAB
BACTERIA WND AEROBE CULT: ABNORMAL
GRAM STN SPEC: ABNORMAL

## 2025-07-20 DIAGNOSIS — I10 ESSENTIAL HYPERTENSION: ICD-10-CM

## 2025-07-21 RX ORDER — OLMESARTAN MEDOXOMIL 5 MG/1
5 TABLET ORAL DAILY
Qty: 30 TABLET | Refills: 0 | Status: SHIPPED | OUTPATIENT
Start: 2025-07-21

## 2025-07-23 ENCOUNTER — OFFICE VISIT (OUTPATIENT)
Dept: FAMILY MEDICINE CLINIC | Facility: CLINIC | Age: 50
End: 2025-07-23
Payer: COMMERCIAL

## 2025-07-23 VITALS
DIASTOLIC BLOOD PRESSURE: 82 MMHG | OXYGEN SATURATION: 97 % | HEIGHT: 72 IN | WEIGHT: 236 LBS | RESPIRATION RATE: 20 BRPM | TEMPERATURE: 98.8 F | BODY MASS INDEX: 31.97 KG/M2 | SYSTOLIC BLOOD PRESSURE: 132 MMHG | HEART RATE: 100 BPM

## 2025-07-23 DIAGNOSIS — J01.10 ACUTE NON-RECURRENT FRONTAL SINUSITIS: ICD-10-CM

## 2025-07-23 DIAGNOSIS — J34.89 NASAL SORE: Primary | ICD-10-CM

## 2025-07-23 DIAGNOSIS — J34.3 HYPERTROPHY OF BOTH INFERIOR NASAL TURBINATES: ICD-10-CM

## 2025-07-23 DIAGNOSIS — J34.2 DEVIATED NASAL SEPTUM: ICD-10-CM

## 2025-07-23 PROCEDURE — 99214 OFFICE O/P EST MOD 30 MIN: CPT

## 2025-07-23 RX ORDER — PREDNISONE 10 MG/1
TABLET ORAL
Qty: 20 TABLET | Refills: 0 | Status: SHIPPED | OUTPATIENT
Start: 2025-07-23 | End: 2025-07-31

## 2025-07-23 RX ORDER — CLINDAMYCIN HYDROCHLORIDE 300 MG/1
300 CAPSULE ORAL 3 TIMES DAILY
Qty: 21 CAPSULE | Refills: 0 | Status: SHIPPED | OUTPATIENT
Start: 2025-07-23 | End: 2025-07-30

## 2025-07-23 NOTE — PROGRESS NOTES
Name: Zurdo Cheema      : 1975      MRN: [id number]  Encounter Provider: Bonita Ya PA-C  Encounter Date: 2025   Encounter department: [facility]  :  Assessment & Plan  Nasal sore  Patient states that Bactrim was not helping him he was not tolerating it well  Patient's wound culture grew staph aureus  Will switch to clindamycin 300 mg 3 times a day for 7 days  Continue with mupirocin  Orders:    clindamycin (CLEOCIN) 300 MG capsule; Take 1 capsule (300 mg total) by mouth 3 (three) times a day for 7 days    Acute non-recurrent frontal sinusitis  We will send patient 8 days of prednisone to help with congestion, sinus pressure  Will be seeing new ENT shortly  Patient does need septoplasty and turbinate reduction    Orders:    predniSONE 10 mg tablet; Take 4 tablets (40 mg total) by mouth daily for 2 days, THEN 3 tablets (30 mg total) daily for 2 days, THEN 2 tablets (20 mg total) daily for 2 days, THEN 1 tablet (10 mg total) daily for 2 days.    Hypertrophy of both inferior nasal turbinates    Orders:    Ambulatory Referral to Otolaryngology; Future    Deviated nasal septum    Orders:    Ambulatory Referral to Otolaryngology; Future           History of Present Illness   Patient is a 49-year-old male who presents today for urgent care follow-up.  Patient was seen last Thursday in urgent care for nasal sore.  Patient was prescribed Bactrim and mupirocin and wound was cultured.  Wound came back with staph.  Today patient states that the wound has not gotten any better, he is very stuffed up and cannot breathe, the Bactrim is tearing his stomach up and he is not tolerating it well.  Patient denies any other acute concerns today.  Patient denies chest pain, shortness of breath, abdominal pain, changes in bowels.      Review of Systems   Constitutional:  Negative for chills, fatigue and fever.   HENT:  Positive for congestion, ear pain and rhinorrhea. Negative for sore  throat.         Right nasal sore   Eyes:  Negative for pain.   Respiratory:  Negative for cough, chest tightness and shortness of breath.    Cardiovascular:  Negative for chest pain and palpitations.   Gastrointestinal:  Negative for abdominal pain, constipation, diarrhea, nausea and vomiting.   Genitourinary:  Negative for difficulty urinating and dysuria.   Musculoskeletal:  Negative for arthralgias and back pain.   Skin:  Negative for color change and rash.   Neurological:  Negative for syncope and headaches.   All other systems reviewed and are negative.      Objective   /82 (Patient Position: Sitting, Cuff Size: Large)   Pulse 100   Temp 98.8 °F (37.1 °C) (Tympanic)   Resp 20   Ht 6' (1.829 m)   Wt 107 kg (236 lb)   SpO2 97%   BMI 32.01 kg/m²      Physical Exam  Vitals and nursing note reviewed.   Constitutional:       General: He is not in acute distress.     Appearance: Normal appearance. He is well-developed.   HENT:      Head: Normocephalic and atraumatic.      Right Ear: Tympanic membrane normal.      Left Ear: Tympanic membrane normal.      Nose: Congestion and rhinorrhea present.        Mouth/Throat:      Mouth: Mucous membranes are moist.     Eyes:      Conjunctiva/sclera: Conjunctivae normal.       Cardiovascular:      Rate and Rhythm: Normal rate and regular rhythm.      Heart sounds: Normal heart sounds. No murmur heard.  Pulmonary:      Effort: Pulmonary effort is normal. No respiratory distress.      Breath sounds: Normal breath sounds. No wheezing or rhonchi.   Abdominal:      Palpations: Abdomen is soft.      Tenderness: There is no abdominal tenderness.     Musculoskeletal:         General: No swelling.      Cervical back: Neck supple.     Skin:     General: Skin is warm and dry.      Capillary Refill: Capillary refill takes less than 2 seconds.     Neurological:      Mental Status: He is alert and oriented to person, place, and time.     Psychiatric:         Mood and Affect: Mood  normal.         Behavior: Behavior normal.         Thought Content: Thought content normal.         Judgment: Judgment normal.       Administrative Statements   I have spent a total time of 35 minutes in caring for this patient on the day of the visit/encounter including Diagnostic results, Prognosis, Risks and benefits of tx options, Instructions for management, Patient and family education, Importance of tx compliance, Risk factor reductions, Impressions, Counseling / Coordination of care, Documenting in the medical record, Reviewing/placing orders in the medical record (including tests, medications, and/or procedures), and Obtaining or reviewing history  .

## 2025-08-05 DIAGNOSIS — E78.1 ESSENTIAL HYPERTRIGLYCERIDEMIA: ICD-10-CM

## 2025-08-07 RX ORDER — FENOFIBRATE 145 MG/1
145 TABLET, FILM COATED ORAL DAILY
Qty: 30 TABLET | Refills: 0 | Status: SHIPPED | OUTPATIENT
Start: 2025-08-07

## 2025-08-12 ENCOUNTER — APPOINTMENT (OUTPATIENT)
Dept: LAB | Facility: CLINIC | Age: 50
End: 2025-08-12
Payer: COMMERCIAL